# Patient Record
Sex: FEMALE | Race: BLACK OR AFRICAN AMERICAN | NOT HISPANIC OR LATINO | Employment: FULL TIME | URBAN - METROPOLITAN AREA
[De-identification: names, ages, dates, MRNs, and addresses within clinical notes are randomized per-mention and may not be internally consistent; named-entity substitution may affect disease eponyms.]

---

## 2017-01-06 ENCOUNTER — PATIENT MESSAGE (OUTPATIENT)
Dept: ALLERGY | Facility: CLINIC | Age: 47
End: 2017-01-06

## 2017-01-11 DIAGNOSIS — L50.8 CHRONIC URTICARIA: ICD-10-CM

## 2017-01-11 NOTE — TELEPHONE ENCOUNTER
----- Message from Neha Crain sent at 1/5/2017  9:45 AM CST -----  Contact: [facility]/[phone]  RX request - refill or new RX.  Is this a refill or new RX:  refill  RX name and strength: levocetirizine (XYZAL) 5 MG tablet  Directions: Take 1 tablet (5 mg total) by mouth 2 (two) times daily  Is this a 30 day or 90 day RX:    Pharmacy name and phone #: [facility], [address], [facility] [phone]  Comments:  Please call and advise      Thank you

## 2017-01-18 RX ORDER — LEVOCETIRIZINE DIHYDROCHLORIDE 5 MG/1
5 TABLET, FILM COATED ORAL 2 TIMES DAILY
Qty: 90 TABLET | Refills: 3 | Status: SHIPPED | OUTPATIENT
Start: 2017-01-18 | End: 2018-11-27

## 2017-01-31 ENCOUNTER — OFFICE VISIT (OUTPATIENT)
Dept: ALLERGY | Facility: CLINIC | Age: 47
End: 2017-01-31
Payer: COMMERCIAL

## 2017-01-31 VITALS
SYSTOLIC BLOOD PRESSURE: 100 MMHG | HEART RATE: 88 BPM | BODY MASS INDEX: 23.85 KG/M2 | WEIGHT: 126.31 LBS | DIASTOLIC BLOOD PRESSURE: 64 MMHG | HEIGHT: 61 IN

## 2017-01-31 DIAGNOSIS — J30.1 SEASONAL ALLERGIC RHINITIS DUE TO POLLEN: Primary | ICD-10-CM

## 2017-01-31 PROCEDURE — 99213 OFFICE O/P EST LOW 20 MIN: CPT | Mod: S$GLB,,, | Performed by: ALLERGY & IMMUNOLOGY

## 2017-01-31 PROCEDURE — 99999 PR PBB SHADOW E&M-EST. PATIENT-LVL III: CPT | Mod: PBBFAC,,, | Performed by: ALLERGY & IMMUNOLOGY

## 2017-01-31 NOTE — PROGRESS NOTES
Allergy and Immunology Follow Up      CC:  Follow up       HPI:  Shae Trotter  is a 46 y.o. female with hx of allergic rhinitis on immunotherapy since 1999 and hx of chronic urticaria here for her annual follow up. Patient reports return of symptoms off of immunotherapy x6 months.  She reports signficiant improvement in symptoms and occasional use of medications while on immunotherapy.  She wishes to proceed with immunotherapy today.  She denies any shortness of breath, wheezing.  Reports urticaria has resolved since last time.  Denies history of asthma.         REVIEW OF SYSTEMS    The balance of the ROS is negative unless otherwise specified in the HPI.    Past Medical History   Diagnosis Date    Allergy     Breast cancer     Genetic testing 11/2014     BRCA2 deleterious mutation    Other screening mammogram 6/24/2015    Seasonal allergies        Past Surgical History   Procedure Laterality Date    Right breast cysts removed      Left mastectomy and chemo Left 2006     TRAM flap reconstruction     D&c hysterscope.      Mastectomy Left 2006    Tram flap  Left 2006     breast reconstruction.     Pr removal of ovary/tube(s)      Hysterectomy  4/13/15     Robotic LH/BSO        Family History   Problem Relation Age of Onset    Diabetes Mother     Ovarian cancer Neg Hx     Uterine cancer Neg Hx     Breast cancer Neg Hx     Colon cancer Neg Hx        Social History     Social History    Marital status:      Spouse name: N/A    Number of children: N/A    Years of education: N/A     Social History Main Topics    Smoking status: Never Smoker    Smokeless tobacco: Never Used    Alcohol use No    Drug use: No    Sexual activity: Yes     Partners: Male     Birth control/ protection: Condom     Other Topics Concern    None     Social History Narrative       Current Outpatient Prescriptions   Medication Sig Dispense Refill    levocetirizine (XYZAL) 5 MG tablet Take 1 tablet (5 mg total)  by mouth 2 (two) times daily. 90 tablet 3    epinephrine (EPIPEN) 0.3 mg/0.3 mL (1:1,000) AtIn Inject 0.3 mLs (0.3 mg total) into the muscle once. Use in case of a life-threatening allergic reaction 2 Device 1     Current Facility-Administered Medications   Medication Dose Route Frequency Provider Last Rate Last Dose    Allergy Mix   Subcutaneous 1 time in Clinic/HOD Sunny Mujica MD           Review of patient's allergies indicates:   Allergen Reactions    Fish oil Anaphylaxis    Aspirin      Other reaction(s): Hives    Avocado Hives     Other reaction(s): Unknown    Fruit extracts Hives     Other reaction(s): Unknown  Allergic to most fruits - hives/swelling    Penicillins Hives         Vitals:    01/31/17 1437   BP: 100/64   Pulse: 88        PHYSICAL EXAM:  General: NAD, alert and oriented x 3, pleasant   Head:    normocephalic, atraumatic   Eyes:    PERRL, EOMI, no conjunctival injection   Ears:    Bilateral TMs normal, clear canal   Nose:    2-3+ pink/pale turbinates, no polyps, ulcers or other lesions noted, stringy mucus  Throat:  No post nasal drip, No cobblestoning, no oropharyngeal thrush   Neck:    No cervical adenopathy, no thyromegaly  CV:      Normal S1S2, no murmurs/rubs/gallops, regular rhythm   Chest:   Clear to auscultation bilaterally, no wheezes/rales/rhonchi   Abd:     +BS, soft, nontender, nondistended, no HSM   Ext:     No cyanosis, clubbing, or edema   Skin:    No rashes  Psych:   Normal affect and mood  Neuro:   No ataxia, Cranial nerves grossly intact      Lab Results   Component Value Date    WBC 7.07 10/03/2016    HGB 12.7 10/03/2016    HCT 40.2 10/03/2016    MCV 82 10/03/2016     10/03/2016     @UZKCFGORD49(GLU,NA,K,Cl,CO2,BUN,Creatinine,Calcium,MG)@  Lab Results   Component Value Date    INR 1.0 10/03/2016    INR 1.0 12/16/2015     Lab Results   Component Value Date    HGBA1C 5.3 07/28/2010     No results for input(s): POCTGLUCOSE in the last 72  hours.      =============================================================================  ALLERGEN TESTING   ----------------    SKIN PRICK TESTING: Hx of skin test positivity to multiple allergens excluding molds.     IMMUNOCAPS: n/A    =============================================================================  PULMONARY FUNCTION  ------------------    PEAK FLOWS (CLINIC): N/A    PULM FUNCTION TESTS: N/A     .  ASSESSMENT/ PLAN    Allergic rhinitis: on allergen immunotherapy: Symptoms refractory off of immunotherapy and would like to continue it.  - 0.05cc 1:10 yellow vial administered today.  Patient watched for 30 minutes with no change in clinical status and discharge in stable condition  - Patient to follow up with infusion center for continued injections  - Patient declined epipen as she has not needed to use it in the past.  She understands need for emergent care if she has concerning signs and symptoms of anaphylacis.   - Continue Xyzal 5 mg po daily.     Chronic urticaria: Resolved.      Follow up in 1 year or sooner should symptoms flare.  Shae was seen today for other.    Diagnoses and all orders for this visit:    Seasonal allergic rhinitis due to pollen          Kortney Laura DO   Allergy&Immunology Fellow   PGY-5  01/31/2017

## 2017-02-13 ENCOUNTER — PATIENT MESSAGE (OUTPATIENT)
Dept: ALLERGY | Facility: CLINIC | Age: 47
End: 2017-02-13

## 2017-03-01 ENCOUNTER — PATIENT MESSAGE (OUTPATIENT)
Dept: ALLERGY | Facility: CLINIC | Age: 47
End: 2017-03-01

## 2017-03-03 ENCOUNTER — TELEPHONE (OUTPATIENT)
Dept: ALLERGY | Facility: CLINIC | Age: 47
End: 2017-03-03

## 2017-03-06 NOTE — TELEPHONE ENCOUNTER
Urszula,    Just looking at encounters and thought i would send this as a reminder.  Are the injections ready.

## 2017-03-14 ENCOUNTER — CLINICAL SUPPORT (OUTPATIENT)
Dept: INFECTIOUS DISEASES | Facility: CLINIC | Age: 47
End: 2017-03-14
Payer: COMMERCIAL

## 2017-03-14 PROCEDURE — 95115 IMMUNOTHERAPY ONE INJECTION: CPT | Mod: S$GLB,,, | Performed by: INTERNAL MEDICINE

## 2017-04-04 ENCOUNTER — PATIENT MESSAGE (OUTPATIENT)
Dept: ALLERGY | Facility: CLINIC | Age: 47
End: 2017-04-04

## 2017-04-17 ENCOUNTER — CLINICAL SUPPORT (OUTPATIENT)
Dept: INTERNAL MEDICINE | Facility: CLINIC | Age: 47
End: 2017-04-17
Payer: COMMERCIAL

## 2017-04-17 DIAGNOSIS — J30.9 ALLERGIC RHINITIS, UNSPECIFIED ALLERGIC RHINITIS TRIGGER, UNSPECIFIED RHINITIS SEASONALITY: ICD-10-CM

## 2017-04-17 PROCEDURE — 99499 UNLISTED E&M SERVICE: CPT | Mod: S$GLB,,, | Performed by: FAMILY MEDICINE

## 2017-04-17 PROCEDURE — 95117 IMMUNOTHERAPY INJECTIONS: CPT | Mod: S$GLB,,, | Performed by: FAMILY MEDICINE

## 2017-04-17 NOTE — PROGRESS NOTES
Patient  In  for Allergy injections.  SEE XIS . Tolerated very well. No problems from last injection.

## 2017-04-21 ENCOUNTER — CLINICAL SUPPORT (OUTPATIENT)
Dept: INTERNAL MEDICINE | Facility: CLINIC | Age: 47
End: 2017-04-21
Payer: COMMERCIAL

## 2017-04-21 DIAGNOSIS — J30.9 ALLERGIC RHINITIS, UNSPECIFIED ALLERGIC RHINITIS TRIGGER, UNSPECIFIED RHINITIS SEASONALITY: ICD-10-CM

## 2017-04-21 PROCEDURE — 95117 IMMUNOTHERAPY INJECTIONS: CPT | Mod: S$GLB,,, | Performed by: FAMILY MEDICINE

## 2017-04-21 PROCEDURE — 99499 UNLISTED E&M SERVICE: CPT | Mod: S$GLB,,, | Performed by: FAMILY MEDICINE

## 2017-04-21 NOTE — PROGRESS NOTES
Patient in for allergy injection. Stated no new meds, no problems with last injection.    0.15  ml of 1:10  1 of 3   given SQ in  Left Upper arm. Tolerated well.      0.15 ml of 1:10  2 of 3   Given SQ in  Left Lower arm . Tolerated well.     0.15 ml of 1:10   3 of 3   Given SQ in Right lower arm . Tolerated well.     Patient assessed after 30 minutes . No reaction noted.     Patient voice no complaints

## 2017-05-08 ENCOUNTER — CLINICAL SUPPORT (OUTPATIENT)
Dept: INTERNAL MEDICINE | Facility: CLINIC | Age: 47
End: 2017-05-08
Payer: COMMERCIAL

## 2017-05-08 DIAGNOSIS — J30.9 ALLERGIC RHINITIS, UNSPECIFIED ALLERGIC RHINITIS TRIGGER, UNSPECIFIED RHINITIS SEASONALITY: ICD-10-CM

## 2017-05-08 PROCEDURE — 99499 UNLISTED E&M SERVICE: CPT | Mod: S$GLB,,, | Performed by: FAMILY MEDICINE

## 2017-05-08 PROCEDURE — 95117 IMMUNOTHERAPY INJECTIONS: CPT | Mod: S$GLB,,, | Performed by: FAMILY MEDICINE

## 2017-05-08 NOTE — PROGRESS NOTES
Patient in for allergy injection. Stated no new meds, no problems with last injection.    0.15  ml of 1:10 1 of 3   given SQ in Left Upper  arm. Tolerated well.    0.15 ml of 1:10  2 of 3  Given SQ in Left Lower arm. Tolerated well.  0.15 ml of 1:10  3 of 3 given SQ in Right Upper arm.                Patient assessed after 30 minutes . No reaction noted.     Patient voice no complaints

## 2017-05-22 ENCOUNTER — CLINICAL SUPPORT (OUTPATIENT)
Dept: INTERNAL MEDICINE | Facility: CLINIC | Age: 47
End: 2017-05-22
Payer: COMMERCIAL

## 2017-05-22 DIAGNOSIS — J30.9 ALLERGIC RHINITIS, UNSPECIFIED ALLERGIC RHINITIS TRIGGER, UNSPECIFIED RHINITIS SEASONALITY: ICD-10-CM

## 2017-05-22 PROCEDURE — 99499 UNLISTED E&M SERVICE: CPT | Mod: S$GLB,,, | Performed by: FAMILY MEDICINE

## 2017-05-22 PROCEDURE — 95117 IMMUNOTHERAPY INJECTIONS: CPT | Mod: S$GLB,,, | Performed by: FAMILY MEDICINE

## 2017-05-22 PROCEDURE — 99999 PR PBB SHADOW E&M-EST. PATIENT-LVL I: CPT | Mod: PBBFAC,,,

## 2017-05-25 ENCOUNTER — PATIENT OUTREACH (OUTPATIENT)
Dept: ADMINISTRATIVE | Facility: HOSPITAL | Age: 47
End: 2017-05-25

## 2017-05-26 ENCOUNTER — PATIENT MESSAGE (OUTPATIENT)
Dept: ADMINISTRATIVE | Facility: HOSPITAL | Age: 47
End: 2017-05-26

## 2017-05-30 ENCOUNTER — OFFICE VISIT (OUTPATIENT)
Dept: NEUROSURGERY | Facility: CLINIC | Age: 47
End: 2017-05-30
Payer: COMMERCIAL

## 2017-05-30 VITALS
BODY MASS INDEX: 23.22 KG/M2 | DIASTOLIC BLOOD PRESSURE: 79 MMHG | SYSTOLIC BLOOD PRESSURE: 115 MMHG | HEIGHT: 61 IN | WEIGHT: 123 LBS | HEART RATE: 95 BPM

## 2017-05-30 DIAGNOSIS — M54.9 MUSCULOSKELETAL BACK PAIN: Primary | ICD-10-CM

## 2017-05-30 PROCEDURE — 99999 PR PBB SHADOW E&M-EST. PATIENT-LVL III: CPT | Mod: PBBFAC,,, | Performed by: PHYSICIAN ASSISTANT

## 2017-05-30 PROCEDURE — 99203 OFFICE O/P NEW LOW 30 MIN: CPT | Mod: S$GLB,,, | Performed by: PHYSICIAN ASSISTANT

## 2017-05-30 RX ORDER — CYCLOBENZAPRINE HCL 10 MG
10 TABLET ORAL 3 TIMES DAILY PRN
Qty: 30 TABLET | Refills: 0 | Status: SHIPPED | OUTPATIENT
Start: 2017-05-30 | End: 2018-07-19

## 2017-05-30 NOTE — PROGRESS NOTES
[facility] - Neurosurgery  Clinic Consult     Consult Requested By: Self Referral     Reason for Consult: Back Pain       SUBJECTIVE:     Chief Complaint: left sided mid back pain     History of Present Illness:  Shae Trotter is a 46 y.o. female who presents with complaints of acute onset of left sided upper-mid back pain. She states the pain was present when she woke up this morning. It is located along the medial border of the left scapula. It is constant. She states it feels like a muscle spasm. It is worse with movement of her trunk, especially twisting and bending. She also notes it is occasionally worse with breathing. The pain does not radiate. She denies pain, numbness, or tingling in lower extremities. She denies trauma/injury to the area. he has tried a Salonpas patch without relief.       Low Back Pain Scale  R Low Back-Pain Score: 6  R Low Back-Pain Intensity: The pain is bad, but I manage without taking pain killers  R Low Back-Pain Score: I can look after myself normally but it causes extra pain  Low Back-Lifting: I can lift heavy weights but it gives extra pain   Low Back-Walking: Pain does not prevent me from walking any distance   Low Back-Sitting: I can sit in any chair as long as I like   Low Back-Standing: I can stand as long as I want without pain   Low Back-Sleeping: I have pain in bed but it does not prevent me from sleeping well   Low Back-Social Life: My social life is normal and give me no pain   Low Back-Traveling: I have no pain when traveling   Low Back-Changing Degree of Pain:  (pain is neither getting better or worse)         Review of patient's allergies indicates:   Allergen Reactions    Fish oil Anaphylaxis    Aspirin      Other reaction(s): Hives    Avocado Hives     Other reaction(s): Unknown    Fruit extracts Hives     Other reaction(s): Unknown  Allergic to most fruits - hives/swelling    Penicillins Hives       Past Medical History:   Diagnosis Date    Allergy      Breast cancer     Genetic testing 11/2014    BRCA2 deleterious mutation    Other screening mammogram 6/24/2015    Seasonal allergies      Past Surgical History:   Procedure Laterality Date    D&C hysterscope.      HYSTERECTOMY  4/13/15    Robotic LH/BSO     Left mastectomy and chemo Left 2006    TRAM flap reconstruction     MASTECTOMY Left 2006    [place] REMOVAL OF OVARY/TUBE(S)      Right breast cysts removed      tram flap  Left 2006    breast reconstruction.      Family History   Problem Relation Age of Onset    Diabetes Mother     Ovarian cancer Neg Hx     Uterine cancer Neg Hx     Breast cancer Neg Hx     Colon cancer Neg Hx      Social History   Substance Use Topics    Smoking status: Never Smoker    Smokeless tobacco: Never Used    Alcohol use No        Review of Systems:  Constitutional: no fever, chills or night sweats. No changes in weight   Eyes: no visual changes   ENT: no nasal congestion or sore throat   Respiratory: no cough or shortness of breath   Cardiovascular: no chest pain or palpitations   Gastrointestinal: no nausea or vomiting   Genitourinary: no hematuria or dysuria   Integument/Breast: no rash or pruritis   Musculoskeletal: positive for left sided upper-mid back pain   Neurological: no seizures or tremors. No neuropathy.       OBJECTIVE:     Vital Signs (Most Recent):  Vitals:    05/30/17 1405   BP: 115/79   Pulse: 95       Physical Exam:  General: well developed, well nourished, no distress.   Neurologic: Alert and oriented. Thought content appropriate.  GCS: Motor: 6/Verbal: 5/Eyes: 4 GCS Total: 15  Mental Status: Awake, Alert, Oriented x 4  Language: No aphasia  Speech: No dysarthria  Head: normocephalic, atraumatic  Eyes: EOMI.  Neck: trachea midline, no JVD   Cardiovascular: no LE edema  Pulmonary: normal respirations, no signs of respiratory distress  Abdomen: non-distended  Sensory: intact to light touch throughout  Skin: Skin is warm, dry and intact.    Motor Strength:  Moves all extremities spontaneously with good tone.  No abnormal movements seen.   Strength  Deltoids Triceps Biceps Wrist Extension Wrist Flexion Hand  Interossei   Upper: R 5/5 5/5 5/5 5/5 5/5 5/5 5/5    L 5/5 5/5 5/5 5/5 5/5 5/5 5/5     Iliopsoas Quadriceps Knee  Flexion Tibialis  anterior Gastro- cnemius EHL    Lower: R 5/5 5/5 5/5 5/5 5/5 5/5     L 5/5 5/5 5/5 5/5 5/5 5/5      DTR's: 2 + and symmetric in UE and LE  Cobb: absent  Clonus: absent    Gait: normal         Able to walk on heels & toes    Back: full ROM, pain at the medial border of the left scapula elicited with lateral flexion, L>R. No midline tenderness to palpation. No paraspinal tenderness to palpation.       Diagnostic Results:  N/A    ASSESSMENT/PLAN:     47 yo female with acute onset of left sided upper-mid back pain, likely musculoskeletal.     Discussed with Dr. Bianchi. Due to the long list of differential diagnoses, we recommend CXR and being seen in the ED for evaluation. Discussed these recommendations with the patient. She does not wish to proceed with additional evaluations today. She states she has an appointment to establish care with primary care next week. She understands the need to be evaluated by the ED should her symptoms persist or worsen.     -Ibuprofen OTC as needed  -Flexeril 10 mg TID prn sent to pharmacy    Bridgett Baltazar PA-C  Neurosurgery

## 2017-06-07 ENCOUNTER — PATIENT MESSAGE (OUTPATIENT)
Dept: INTERNAL MEDICINE | Facility: CLINIC | Age: 47
End: 2017-06-07

## 2017-06-07 ENCOUNTER — LAB VISIT (OUTPATIENT)
Dept: LAB | Facility: OTHER | Age: 47
End: 2017-06-07
Attending: INTERNAL MEDICINE
Payer: COMMERCIAL

## 2017-06-07 ENCOUNTER — OFFICE VISIT (OUTPATIENT)
Dept: INTERNAL MEDICINE | Facility: CLINIC | Age: 47
End: 2017-06-07
Attending: INTERNAL MEDICINE
Payer: COMMERCIAL

## 2017-06-07 VITALS
HEART RATE: 94 BPM | HEIGHT: 61 IN | DIASTOLIC BLOOD PRESSURE: 76 MMHG | OXYGEN SATURATION: 96 % | SYSTOLIC BLOOD PRESSURE: 100 MMHG | WEIGHT: 126.31 LBS | BODY MASS INDEX: 23.85 KG/M2

## 2017-06-07 DIAGNOSIS — Z13.220 ENCOUNTER FOR LIPID SCREENING FOR CARDIOVASCULAR DISEASE: ICD-10-CM

## 2017-06-07 DIAGNOSIS — Z13.6 ENCOUNTER FOR LIPID SCREENING FOR CARDIOVASCULAR DISEASE: ICD-10-CM

## 2017-06-07 DIAGNOSIS — Z00.00 ANNUAL PHYSICAL EXAM: Primary | ICD-10-CM

## 2017-06-07 DIAGNOSIS — Z85.3 HX OF BREAST CANCER: ICD-10-CM

## 2017-06-07 DIAGNOSIS — Z00.00 ANNUAL PHYSICAL EXAM: ICD-10-CM

## 2017-06-07 DIAGNOSIS — D22.9 NEVUS: ICD-10-CM

## 2017-06-07 LAB
ALBUMIN SERPL BCP-MCNC: 3.8 G/DL
ALP SERPL-CCNC: 85 U/L
ALT SERPL W/O P-5'-P-CCNC: 15 U/L
ANION GAP SERPL CALC-SCNC: 8 MMOL/L
AST SERPL-CCNC: 16 U/L
BASOPHILS # BLD AUTO: 0 K/UL
BASOPHILS NFR BLD: 0 %
BILIRUB SERPL-MCNC: 0.2 MG/DL
BUN SERPL-MCNC: 15 MG/DL
CALCIUM SERPL-MCNC: 9.4 MG/DL
CHLORIDE SERPL-SCNC: 105 MMOL/L
CHOLEST/HDLC SERPL: 4.1 {RATIO}
CO2 SERPL-SCNC: 28 MMOL/L
CREAT SERPL-MCNC: 0.8 MG/DL
DIFFERENTIAL METHOD: ABNORMAL
EOSINOPHIL # BLD AUTO: 0 K/UL
EOSINOPHIL NFR BLD: 0 %
ERYTHROCYTE [DISTWIDTH] IN BLOOD BY AUTOMATED COUNT: 14.6 %
EST. GFR  (AFRICAN AMERICAN): >60 ML/MIN/1.73 M^2
EST. GFR  (NON AFRICAN AMERICAN): >60 ML/MIN/1.73 M^2
GLUCOSE SERPL-MCNC: 86 MG/DL
HCT VFR BLD AUTO: 40 %
HDL/CHOLESTEROL RATIO: 24.6 %
HDLC SERPL-MCNC: 236 MG/DL
HDLC SERPL-MCNC: 58 MG/DL
HGB BLD-MCNC: 12.5 G/DL
LDLC SERPL CALC-MCNC: 163.6 MG/DL
LYMPHOCYTES # BLD AUTO: 2.1 K/UL
LYMPHOCYTES NFR BLD: 36.3 %
MCH RBC QN AUTO: 26.1 PG
MCHC RBC AUTO-ENTMCNC: 31.3 %
MCV RBC AUTO: 84 FL
MONOCYTES # BLD AUTO: 0.4 K/UL
MONOCYTES NFR BLD: 6.1 %
NEUTROPHILS # BLD AUTO: 3.3 K/UL
NEUTROPHILS NFR BLD: 57.4 %
NONHDLC SERPL-MCNC: 178 MG/DL
PLATELET # BLD AUTO: 369 K/UL
PMV BLD AUTO: 10.7 FL
POTASSIUM SERPL-SCNC: 4.2 MMOL/L
PROT SERPL-MCNC: 7.5 G/DL
RBC # BLD AUTO: 4.79 M/UL
SODIUM SERPL-SCNC: 141 MMOL/L
TRIGL SERPL-MCNC: 72 MG/DL
TSH SERPL DL<=0.005 MIU/L-ACNC: 1.01 UIU/ML
WBC # BLD AUTO: 5.73 K/UL

## 2017-06-07 PROCEDURE — 99999 PR PBB SHADOW E&M-EST. PATIENT-LVL III: CPT | Mod: PBBFAC,,, | Performed by: INTERNAL MEDICINE

## 2017-06-07 PROCEDURE — 84443 ASSAY THYROID STIM HORMONE: CPT

## 2017-06-07 PROCEDURE — 80061 LIPID PANEL: CPT

## 2017-06-07 PROCEDURE — 80053 COMPREHEN METABOLIC PANEL: CPT

## 2017-06-07 PROCEDURE — 99396 PREV VISIT EST AGE 40-64: CPT | Mod: S$GLB,,, | Performed by: INTERNAL MEDICINE

## 2017-06-07 PROCEDURE — 85025 COMPLETE CBC W/AUTO DIFF WBC: CPT

## 2017-06-07 PROCEDURE — 36415 COLL VENOUS BLD VENIPUNCTURE: CPT

## 2017-06-07 NOTE — TELEPHONE ENCOUNTER
Entered mult options for other mm relaxers and receiving message about pot cross reactivity to fish oil. Called and discussed this with pt - will cut current dose of flexeril in 1/2 to see if helps with mm relaxation without making her drowsy. All questions were answered and pt verbalized understanding of plan.

## 2017-06-28 ENCOUNTER — CLINICAL SUPPORT (OUTPATIENT)
Dept: INTERNAL MEDICINE | Facility: CLINIC | Age: 47
End: 2017-06-28
Payer: COMMERCIAL

## 2017-06-28 DIAGNOSIS — J30.9 ALLERGIC RHINITIS, UNSPECIFIED ALLERGIC RHINITIS TRIGGER, UNSPECIFIED RHINITIS SEASONALITY: ICD-10-CM

## 2017-06-28 PROCEDURE — 95117 IMMUNOTHERAPY INJECTIONS: CPT | Mod: S$GLB,,, | Performed by: FAMILY MEDICINE

## 2017-06-28 PROCEDURE — 99499 UNLISTED E&M SERVICE: CPT | Mod: S$GLB,,, | Performed by: FAMILY MEDICINE

## 2017-06-28 NOTE — PROGRESS NOTES
Patient in for allergy injection. Stated no new meds, no problems with last injection.    0.50 ml of 1:100   given SQ in  Left Upper arm. Tolerated well.   0.50 ml of 1:100  Given SQ in Right Upper arm. Tolerated well.  Patient assessed after 30 minutes . No reaction noted.     Patient voice no complaints

## 2017-06-29 NOTE — PROGRESS NOTES
Patient in for allergy injection. Stated no new meds, no problems with last injection.    0.20  ml of 1:10 1 of 3   given SQ in Left Upper  arm. Tolerated well.   0.20 ml of 1:10  2 of 3   Given SQ in Left Lower arm. Tolerated well.  0.20 ml of 1:10  3 of 3   Given SQ in Right Lower arm. Tolerated well.    Patient assessed after 30 minutes . No reaction noted.     Patient voice no complaints

## 2017-07-20 ENCOUNTER — PATIENT MESSAGE (OUTPATIENT)
Dept: ALLERGY | Facility: CLINIC | Age: 47
End: 2017-07-20

## 2017-07-24 ENCOUNTER — CLINICAL SUPPORT (OUTPATIENT)
Dept: INTERNAL MEDICINE | Facility: CLINIC | Age: 47
End: 2017-07-24
Payer: COMMERCIAL

## 2017-07-24 DIAGNOSIS — J30.9 ACUTE ALLERGIC RHINITIS, UNSPECIFIED SEASONALITY, UNSPECIFIED TRIGGER: ICD-10-CM

## 2017-07-24 PROCEDURE — 99499 UNLISTED E&M SERVICE: CPT | Mod: S$GLB,,, | Performed by: ALLERGY & IMMUNOLOGY

## 2017-07-24 PROCEDURE — 95117 IMMUNOTHERAPY INJECTIONS: CPT | Mod: S$GLB,,, | Performed by: FAMILY MEDICINE

## 2017-07-24 NOTE — PROGRESS NOTES
Patient here for allergy injections.  No new meds, no problems with last injection.    0.4mL of 1:100 Blue given SQ in Lower Left Arm.  Tolerated well.  2+ reaction, with erythema and wheal.  0.4mL of 1:100 blue given Sq in Upper Left Arm.  Tolerated well, 0 reaction noted.  0.4mL of 1:100 blue given SQ in Upper Right Arm.  Tolerated well, 0 reaction noted.    No complaints voiced.

## 2017-07-31 ENCOUNTER — CLINICAL SUPPORT (OUTPATIENT)
Dept: INTERNAL MEDICINE | Facility: CLINIC | Age: 47
End: 2017-07-31
Payer: COMMERCIAL

## 2017-07-31 DIAGNOSIS — J30.9 ACUTE ALLERGIC RHINITIS, UNSPECIFIED SEASONALITY, UNSPECIFIED TRIGGER: ICD-10-CM

## 2017-07-31 PROCEDURE — 99499 UNLISTED E&M SERVICE: CPT | Mod: S$GLB,,, | Performed by: ALLERGY & IMMUNOLOGY

## 2017-07-31 PROCEDURE — 95117 IMMUNOTHERAPY INJECTIONS: CPT | Mod: S$GLB,,, | Performed by: FAMILY MEDICINE

## 2017-07-31 NOTE — PROGRESS NOTES
Patient here for allergy injections.  No new meds, no problems with last injection.    0.5mL of 1:100 Blue given SQ in mid Left Arm.  Tolerated well.  2+ reaction, with erythema, no wheal noted.  0.5mL of 1:100 blue given Sq in Upper Left Arm.  Tolerated well, 0 reaction noted.  0.5mL of 1:100 blue given SQ in Upper Right Arm.  Tolerated well, 0 reaction noted.    No complaints voiced.

## 2017-08-27 ENCOUNTER — PATIENT MESSAGE (OUTPATIENT)
Dept: ALLERGY | Facility: CLINIC | Age: 47
End: 2017-08-27

## 2017-09-06 ENCOUNTER — OFFICE VISIT (OUTPATIENT)
Dept: ALLERGY | Facility: CLINIC | Age: 47
End: 2017-09-06
Payer: COMMERCIAL

## 2017-09-06 VITALS
DIASTOLIC BLOOD PRESSURE: 74 MMHG | BODY MASS INDEX: 23.52 KG/M2 | SYSTOLIC BLOOD PRESSURE: 110 MMHG | WEIGHT: 124.56 LBS | HEIGHT: 61 IN

## 2017-09-06 DIAGNOSIS — J30.1 CHRONIC SEASONAL ALLERGIC RHINITIS DUE TO POLLEN: ICD-10-CM

## 2017-09-06 DIAGNOSIS — L50.1 CHRONIC IDIOPATHIC URTICARIA: Primary | ICD-10-CM

## 2017-09-06 PROCEDURE — 3008F BODY MASS INDEX DOCD: CPT | Mod: S$GLB,,, | Performed by: STUDENT IN AN ORGANIZED HEALTH CARE EDUCATION/TRAINING PROGRAM

## 2017-09-06 PROCEDURE — 99214 OFFICE O/P EST MOD 30 MIN: CPT | Mod: S$GLB,,, | Performed by: STUDENT IN AN ORGANIZED HEALTH CARE EDUCATION/TRAINING PROGRAM

## 2017-09-06 PROCEDURE — 99999 PR PBB SHADOW E&M-EST. PATIENT-LVL III: CPT | Mod: PBBFAC,,, | Performed by: STUDENT IN AN ORGANIZED HEALTH CARE EDUCATION/TRAINING PROGRAM

## 2017-09-06 NOTE — PROGRESS NOTES
Progress Notes:    CC: follow up chronic idiopathic urticaria and chronic allergic rhinitis    HPI: Oralia Trotter  is a 46 y.o. female with hx of allergic rhinitis on immunotherapy and hx of chronic idiopathic urticaria here for her follow up and her current urticaria lesions. Patient stated that she started having urticaria lesions 2 weeks ago. She was taking cetirizine 10 mg 2 tablets twice a day for one week, but she did not have any improvement in her symptoms of urticaria. She has urticarial like lesions on her lower legs, body and upper extremities which was itchy, tevin on night even though she takes cetirizine in the evening. Denies any respiratory symptoms, GI symptoms etc. She is on immunotherapy for chronic allergic rhinitis for more than 10 years. Patient stated that when she was off for immunotherapy, she c/o rhinitis symptoms therefore, she is now having immunotherapy injection every 2 weeks.  She has signficiant improvement in rhinitis symptoms on immunotherapy.    REVIEW OF SYSTEMS     CONST: no F/C/NS, no unintentional weight changes  NEURO: no H/A, no weakness, no paresthesias  EYES: no discharge, no pruritus, no erythema  EARS: no hearing loss, no sensation of fullness  NOSE: no congestion, no rhinorrhea, no discharge, no itching, no sneezing  PULM: no SOB, no wheezing, no cough, no snoring  CV: no CP, no palpitations, no leg swelling  GI: no dysphagia, no heartburn, no pain, no N/V/D, no BRBPR/melena  URO: no dysuria, no hematuria, no nocturia  MSK: no joint pain, no muscle pain  DERM: raised erythematous urticarial lesions on her lower abdomen           Past Medical History   Diagnosis Date    Allergy      Breast cancer      Genetic testing 11/2014       BRCA2 deleterious mutation    Other screening mammogram 6/24/2015    Seasonal allergies                  Past Surgical History   Procedure Laterality Date    Right breast cysts removed        Left mastectomy and chemo Left 2006       TRAM  flap reconstruction     D&c hysterscope.        Mastectomy Left 2006    Tram flap  Left 2006       breast reconstruction.     Pr removal of ovary/tube(s)        Hysterectomy   4/13/15       Robotic LH/BSO                Family History   Problem Relation Age of Onset    Diabetes Mother      Ovarian cancer Neg Hx      Uterine cancer Neg Hx      Breast cancer Neg Hx      Colon cancer Neg Hx           Social History            Social History    Marital status:        Spouse name: N/A    Number of children: N/A    Years of education: N/A            Social History Main Topics    Smoking status: Never Smoker    Smokeless tobacco: Never Used    Alcohol use No    Drug use: No    Sexual activity: Yes       Partners: Male       Birth control/ protection: Condom           Other Topics Concern    None      Social History Narrative         Current Medications          Current Outpatient Prescriptions   Medication Sig Dispense Refill    levocetirizine (XYZAL) 5 MG tablet Take 1 tablet (5 mg total) by mouth 2 (two) times daily. 90 tablet 3    epinephrine (EPIPEN) 0.3 mg/0.3 mL (1:1,000) AtIn Inject 0.3 mLs (0.3 mg total) into the muscle once. Use in case of a life-threatening allergic reaction 2 Device 1                Current Facility-Administered Medications   Medication Dose Route Frequency Provider Last Rate Last Dose    Allergy Mix   Subcutaneous 1 time in Clinic/HOD Sunny Mujica MD                      Review of patient's allergies indicates:   Allergen Reactions    Fish oil Anaphylaxis    Aspirin         Other reaction(s): Hives    Avocado Hives       Other reaction(s): Unknown    Fruit extracts Hives       Other reaction(s): Unknown  Allergic to most fruits - hives/swelling    Penicillins Hives         PHYSICAL EXAM:  /74mmHg.  General: NAD, alert and oriented x 3, pleasant   Head:    normocephalic, atraumatic   Eyes:    PERRL, EOMI, no conjunctival injection   Ears:    Bilateral  TMs normal, clear canal   Nose:    3+ pink turbinates, no polyps, ulcers or other lesions noted, stringy mucus  Throat:  No post nasal drip, No cobblestoning, no oropharyngeal thrush   Neck:    No cervical adenopathy, no thyromegaly  CV:      Normal S1S2, no murmurs/rubs/gallops, regular rhythm   Chest:   Clear to auscultation bilaterally, no wheezes/rales/rhonchi   Abd:     +BS, soft, nontender, nondistended, no HSM   Ext:     No cyanosis, clubbing, or edema   Skin:    No rashes  Psych:   Normal affect and mood  Neuro:   No ataxia, Cranial nerves grossly intact              Lab Results   Component Value Date     WBC 7.07 10/03/2016     HGB 12.7 10/03/2016     HCT 40.2 10/03/2016     MCV 82 10/03/2016      10/03/2016      @[id number](GLU,NA,K,Cl,CO2,BUN,Creatinine,Calcium,MG)@        Lab Results   Component Value Date     INR 1.0 10/03/2016     INR 1.0 12/16/2015            Lab Results   Component Value Date     HGBA1C 5.3 07/28/2010      No results for input(s): POCTGLUCOSE in the last 72 hours.        =============================================================================  ALLERGEN TESTING   ----------------     SKIN PRICK TESTING: Hx of skin test positivity to multiple allergens excluding molds.      IMMUNOCAPS: N/A    Assessment     Chronic idiopathic urticaria: minimally controlled with high dose cetirizine 10mg 2 tablets twice daily.   -to start Omalizumab 300mg Subcutaneously every 4 weeks.   -Explained to the patient dose range, route of injections and side effect of medication to the patient.   -In the mean time, to continue take cetirizine 10mg 2 tablets in the morning and at night with zantac 150mg twice daily.    Chronic allergic rhinitis: controlled with immunotherapy injections every 2 weeks.    RTC 6 weeks    Thu Yamini Membreno M.D  Allergy/Immunology Fellow.

## 2017-09-06 NOTE — PATIENT INSTRUCTIONS
-Prescribed Omalizumab 300mg subcutaneously for chronic urticaria.  -In the mean time to take cetirizine 10mg 2 tablets twice daily with zantac 150mg twice daily.

## 2017-09-07 ENCOUNTER — TELEPHONE (OUTPATIENT)
Dept: PHARMACY | Facility: CLINIC | Age: 47
End: 2017-09-07

## 2017-09-07 NOTE — TELEPHONE ENCOUNTER
Informed patient we have received an order for Xolair  from your provider and will contact you once a benefits investigation is complete with copay information to set up pickup or shipment and [facility] consultation.  feel free to give us a call with  any questions prior to hearing back from us at [phone]. thank you!_KAY 9/7/17

## 2017-09-07 NOTE — TELEPHONE ENCOUNTER
[facility] received prescription for Xolair 300mg SQ every 28 days.  Prior authorization is required.      Patient's Estimated Creatinine Clearance:  78mL/min (based on Cr of 0.8 from 6/7/2017). Dosage adjustment is not required.     Dosage appropriate based on therapy for Chronic idiopathic urticaria: SubQ: 150 or 300 mg every 4 weeks. Dosing is not dependent on serum IgE (free or total) level or body weight.    DDIs: medication list reviewed, none expected with Xolair

## 2017-09-11 ENCOUNTER — CLINICAL SUPPORT (OUTPATIENT)
Dept: INTERNAL MEDICINE | Facility: CLINIC | Age: 47
End: 2017-09-11
Payer: COMMERCIAL

## 2017-09-11 DIAGNOSIS — J30.9 ACUTE ALLERGIC RHINITIS, UNSPECIFIED SEASONALITY, UNSPECIFIED TRIGGER: ICD-10-CM

## 2017-09-11 PROCEDURE — 95117 IMMUNOTHERAPY INJECTIONS: CPT | Mod: S$GLB,,, | Performed by: FAMILY MEDICINE

## 2017-09-11 PROCEDURE — 99499 UNLISTED E&M SERVICE: CPT | Mod: S$GLB,,, | Performed by: FAMILY MEDICINE

## 2017-09-11 NOTE — PROGRESS NOTES
Patient here for allergy injections.  No new meds, no problems with last injection.  Patient did state that she had been developing hives since her last injection and was seen by Dr. Mujica.  He advised her to take 40mg of Zyrtec daily.  She stated hives have resolved.  Because patient has had an injection since 7/31/17 (42 days) patient was backed up to 0.2mL of 1:100.      0.2mL of 1:100 Blue given SQ in mid Left Arm.  Tolerated well.  0 reaction noted.  0.2mL of 1:100 blue given Sq in Upper Left Arm.  Tolerated well, 0 reaction noted.  0.2mL of 1:100 blue given SQ in Upper Right Arm.  Tolerated well, 0 reaction noted.    No complaints voiced.

## 2017-09-15 ENCOUNTER — CLINICAL SUPPORT (OUTPATIENT)
Dept: INTERNAL MEDICINE | Facility: CLINIC | Age: 47
End: 2017-09-15
Payer: COMMERCIAL

## 2017-09-15 DIAGNOSIS — J30.1 ALLERGIC RHINITIS DUE TO POLLEN, UNSPECIFIED CHRONICITY, UNSPECIFIED SEASONALITY: ICD-10-CM

## 2017-09-15 PROCEDURE — 95117 IMMUNOTHERAPY INJECTIONS: CPT | Mod: S$GLB,,, | Performed by: INTERNAL MEDICINE

## 2017-09-15 PROCEDURE — 99499 UNLISTED E&M SERVICE: CPT | Mod: S$GLB,,, | Performed by: ALLERGY & IMMUNOLOGY

## 2017-09-15 NOTE — PROGRESS NOTES
Patient here for allergy injections.  No new meds, no problems with last injection.     0.3mL of 1:100 Blue given SQ in mid Left Arm.  Tolerated well.  0 reaction noted.  0.3mL of 1:100 blue given Sq in Upper Left Arm.  Tolerated well, 0 reaction noted.  0.3mL of 1:100 blue given SQ in Upper Right Arm.  Tolerated well, 0 reaction noted.    No complaints voiced.

## 2017-10-06 ENCOUNTER — CLINICAL SUPPORT (OUTPATIENT)
Dept: INTERNAL MEDICINE | Facility: CLINIC | Age: 47
End: 2017-10-06
Payer: COMMERCIAL

## 2017-10-06 DIAGNOSIS — J30.1 ALLERGIC RHINITIS DUE TO POLLEN, UNSPECIFIED CHRONICITY, UNSPECIFIED SEASONALITY: Primary | ICD-10-CM

## 2017-10-06 NOTE — PROGRESS NOTES
Pt states, no changes in meds, no problems with last injection, no peakflow required.     Injections given:  One of three (dm/c/d) vial 1:100 (blue) 0.3ml sub q Upper L arm  No notable reaction after 30minutes, pt tolerated well.    Two of three ( gr/tr) vial 1:100 (blue) 0.3ml sub q Mid Left arm  No notable reaction after 30 min, pt tolerated well.     Three of three ( w/tr) vial 1:100 (blue) 0.3ml sub q upper right arm  No notable reaction after 30 min, pt tolerated well.

## 2017-10-11 ENCOUNTER — CLINICAL SUPPORT (OUTPATIENT)
Dept: INTERNAL MEDICINE | Facility: CLINIC | Age: 47
End: 2017-10-11
Payer: COMMERCIAL

## 2017-10-11 DIAGNOSIS — J30.9 ACUTE ALLERGIC RHINITIS, UNSPECIFIED SEASONALITY, UNSPECIFIED TRIGGER: ICD-10-CM

## 2017-10-11 PROCEDURE — 95117 IMMUNOTHERAPY INJECTIONS: CPT | Mod: S$GLB,,, | Performed by: FAMILY MEDICINE

## 2017-10-11 PROCEDURE — 99499 UNLISTED E&M SERVICE: CPT | Mod: S$GLB,,, | Performed by: ALLERGY & IMMUNOLOGY

## 2017-10-11 NOTE — PROGRESS NOTES
Pt states, no changes in meds, no problems with last injection, no peakflow required.     Injections given:  One of three (dm/c/d) vial 1:100 (blue) 0.4ml sub q Mid Left arm  2+ reaction noted after  30 minutes, pt tolerated well.    Two of three ( gr/tr) vial 1:100 (blue) 0.4ml sub q  Upper Left arm  No notable reaction after 30 min, pt tolerated well.     Three of three ( w/tr) vial 1:100 (blue) 0.4ml sub q upper right arm  No notable reaction after 30 min, pt tolerated well.     Patient voiced no complaints.

## 2017-11-06 ENCOUNTER — TELEPHONE (OUTPATIENT)
Dept: ALLERGY | Facility: CLINIC | Age: 47
End: 2017-11-06

## 2017-11-06 DIAGNOSIS — L50.1 CHRONIC IDIOPATHIC URTICARIA: Primary | ICD-10-CM

## 2017-11-06 NOTE — TELEPHONE ENCOUNTER
----- Message from Fior Buck PharmD sent at 11/6/2017  2:26 PM CST -----  Regarding: Xolair Denial  Hi Dr Mujica and staff,    Wanted to followup regarding a Xolair rx that was sent to us from Dr Yamini Membreno MD in Sept.     For chronic idiopathic urticaria- Xolair approval requires:     1. Presence of hives on most days of the week for at least 6 weeks despite trial of high dose H1 antihistamine (such as four-fold dosing of Clarinex or Xyzal) AND a leukotriene antagonist for at least 2 weeks.     We see in the notes she is on high doses of antihistamines but don't see any record of Singulair trial (at least 2 weeks). Please advise how you would like to proceed. Thank you!    Fior Buck, Pharm.D  Specialty Pharmacy Clinical Pharmacist  [facility]  Phone: [phone]

## 2017-11-08 ENCOUNTER — PATIENT MESSAGE (OUTPATIENT)
Dept: ALLERGY | Facility: CLINIC | Age: 47
End: 2017-11-08

## 2017-11-08 RX ORDER — MONTELUKAST SODIUM 10 MG/1
10 TABLET ORAL NIGHTLY
Qty: 30 TABLET | Refills: 0 | Status: SHIPPED | OUTPATIENT
Start: 2017-11-08 | End: 2017-12-08

## 2017-11-10 ENCOUNTER — TELEPHONE (OUTPATIENT)
Dept: ALLERGY | Facility: CLINIC | Age: 47
End: 2017-11-10

## 2017-11-10 NOTE — TELEPHONE ENCOUNTER
----- Message from Patsy Brooks LPN sent at 11/8/2017  3:40 PM CST -----  Patient needs a follow up call

## 2017-11-20 ENCOUNTER — TELEPHONE (OUTPATIENT)
Dept: ALLERGY | Facility: CLINIC | Age: 47
End: 2017-11-20

## 2017-11-20 ENCOUNTER — PATIENT MESSAGE (OUTPATIENT)
Dept: INTERNAL MEDICINE | Facility: CLINIC | Age: 47
End: 2017-11-20

## 2017-11-20 ENCOUNTER — CLINICAL SUPPORT (OUTPATIENT)
Dept: INTERNAL MEDICINE | Facility: CLINIC | Age: 47
End: 2017-11-20
Payer: COMMERCIAL

## 2017-11-20 DIAGNOSIS — J30.9 CHRONIC ALLERGIC RHINITIS, UNSPECIFIED SEASONALITY, UNSPECIFIED TRIGGER: ICD-10-CM

## 2017-11-20 PROCEDURE — 99499 UNLISTED E&M SERVICE: CPT | Mod: S$GLB,,, | Performed by: ALLERGY & IMMUNOLOGY

## 2017-11-20 PROCEDURE — 95117 IMMUNOTHERAPY INJECTIONS: CPT | Mod: S$GLB,,, | Performed by: FAMILY MEDICINE

## 2017-11-20 NOTE — TELEPHONE ENCOUNTER
Spoke with Dr. Mujica Wednesday, 11/15/17 re:  Patient's dose for IT.  1:100 Blue was not made, even though ordered.  Dr. Mujica gave verbal order that since patient has tolerated 0.1mL of Yellow 1:10, she can get that dosage for next injection.  Last dose was 1:100, 0.4mL.  Patient has been on maintenance dose and vial in the past and has tolerated 0.1mL of 1:10 yellow.

## 2017-11-20 NOTE — PROGRESS NOTES
Pt states, no changes in meds, no problems with last injection.  Verbal order from Dr. Mujica 11/15/17 to give patient 0.1mL of 1:10 of all three injections.  Injections given:  0.1mL of Yellow 1:10 given SQ in Upper Left arm 1 of 3.  Tolerated well.    0.1mL of yellow 1:10 given SQ in Upper Right arm 2 of 3..  Tolerated well    0.1mL of yellow 1:10 given SQ in Lower Left arm 3 of 3.  Tolerated well.    Patient assessed after 30 minutes.    1/3 left arm, 1+ reaction.  2/3 right arm, 0 reaction.  3/3 lower left arm, 1+ reaction.    Patient voiced no complaints.

## 2017-11-21 NOTE — TELEPHONE ENCOUNTER
Likely viral - supportive care with inc rest and fluids is recommended. Can take mucinex to thin mucous and dextromethorphan to help with cough  Can take otc tylenol and alternate with ibuprofen to help with body aches or fever. If not improving then rec she make UC appt

## 2017-11-21 NOTE — TELEPHONE ENCOUNTER
Called pt and discussed  rec and advice.   Pt verbally understands and states that she will try the RX that was rec by  .  Pt verbally understands and has no further questions or concerns.

## 2017-11-22 ENCOUNTER — CLINICAL SUPPORT (OUTPATIENT)
Dept: INTERNAL MEDICINE | Facility: CLINIC | Age: 47
End: 2017-11-22
Payer: COMMERCIAL

## 2017-11-22 DIAGNOSIS — J30.9 CHRONIC ALLERGIC RHINITIS, UNSPECIFIED SEASONALITY, UNSPECIFIED TRIGGER: ICD-10-CM

## 2017-11-22 DIAGNOSIS — N85.9 OTHER SPECIFIED DISORDERS OF UTERUS, NOT ELSEWHERE CLASSIFIED: ICD-10-CM

## 2017-11-22 PROCEDURE — 95117 IMMUNOTHERAPY INJECTIONS: CPT | Mod: S$GLB,,, | Performed by: FAMILY MEDICINE

## 2017-11-22 PROCEDURE — 99499 UNLISTED E&M SERVICE: CPT | Mod: S$GLB,,, | Performed by: ALLERGY & IMMUNOLOGY

## 2017-11-22 NOTE — PROGRESS NOTES
Pt states, no changes in meds, no problems with last injection.  Verbal order from Dr. Mujica 11/15/17 to give patient 0.1mL of 1:10 of all three injections.  Injections given:  0.15mL of Yellow 1:10 given SQ in Upper Left arm 1 of 3.  Tolerated well.    0.15mL of yellow 1:10 given SQ in Upper Right arm 2 of 3..  Tolerated well    0.15mL of yellow 1:10 given SQ in Lower Left arm 3 of 3.  Tolerated well.    Patient assessed after 30 minutes.    1/3 left arm, 1+ reaction.  2/3 right arm, 0 reaction.  3/3 lower left arm, 1+ reaction.    Patient voiced no complaints.

## 2017-11-27 ENCOUNTER — PATIENT MESSAGE (OUTPATIENT)
Dept: INTERNAL MEDICINE | Facility: CLINIC | Age: 47
End: 2017-11-27

## 2017-11-29 ENCOUNTER — TELEPHONE (OUTPATIENT)
Dept: ALLERGY | Facility: CLINIC | Age: 47
End: 2017-11-29

## 2017-12-06 ENCOUNTER — CLINICAL SUPPORT (OUTPATIENT)
Dept: INTERNAL MEDICINE | Facility: CLINIC | Age: 47
End: 2017-12-06
Payer: COMMERCIAL

## 2017-12-06 DIAGNOSIS — J30.9 CHRONIC ALLERGIC RHINITIS, UNSPECIFIED SEASONALITY, UNSPECIFIED TRIGGER: ICD-10-CM

## 2017-12-06 PROCEDURE — 99499 UNLISTED E&M SERVICE: CPT | Mod: S$GLB,,, | Performed by: ALLERGY & IMMUNOLOGY

## 2017-12-06 PROCEDURE — 95117 IMMUNOTHERAPY INJECTIONS: CPT | Mod: S$GLB,,, | Performed by: FAMILY MEDICINE

## 2017-12-06 NOTE — PROGRESS NOTES
Pt states, no changes in meds, no problems with last injection.  Verbal order from Dr. Mujica 11/15/17 to give patient 0.1mL of 1:10 of all three injections.  Injections given:  0.1mL of Yellow 1:10 given SQ in Upper Left arm 1 of 3.  Tolerated well.    0.1mL of yellow 1:10 given SQ in Lower left  arm 2 of 3..  Tolerated well    0.1mL of yellow 1:10 given SQ in Upper Right arm 3 of 3.  Tolerated well.    Patient assessed after 30 minutes.    1/3 left arm, 2+ reaction.  2/3 right arm, 0 reaction.  3/3 lower left arm, 0 reaction.    Patient voiced no complaints.

## 2017-12-22 ENCOUNTER — CLINICAL SUPPORT (OUTPATIENT)
Dept: INTERNAL MEDICINE | Facility: CLINIC | Age: 47
End: 2017-12-22
Payer: COMMERCIAL

## 2017-12-22 DIAGNOSIS — J30.9 CHRONIC ALLERGIC RHINITIS, UNSPECIFIED SEASONALITY, UNSPECIFIED TRIGGER: ICD-10-CM

## 2017-12-22 PROCEDURE — 95117 IMMUNOTHERAPY INJECTIONS: CPT | Mod: S$GLB,,, | Performed by: FAMILY MEDICINE

## 2017-12-22 PROCEDURE — 99499 UNLISTED E&M SERVICE: CPT | Mod: S$GLB,,, | Performed by: ALLERGY & IMMUNOLOGY

## 2017-12-22 NOTE — PROGRESS NOTES
Pt states, no changes in meds, no problems with last injection.    Injections given:  0.1mL of Yellow 1:10 given SQ in Upper Left arm 1 of 3.  Tolerated well.    0.1mL of yellow 1:10 given SQ in Lower left  arm 2 of 3..  Tolerated well    0.1mL of yellow 1:10 given SQ in Upper Right arm 3 of 3.  Tolerated well.    Patient assessed after 30 minutes.    1/3 left arm,0 reaction.  2/3 right arm, 0 reaction.  3/3 lower left arm, 0 reaction.    Patient voiced no complaints.

## 2017-12-27 ENCOUNTER — CLINICAL SUPPORT (OUTPATIENT)
Dept: INTERNAL MEDICINE | Facility: CLINIC | Age: 47
End: 2017-12-27
Payer: COMMERCIAL

## 2017-12-27 DIAGNOSIS — J30.9 CHRONIC ALLERGIC RHINITIS, UNSPECIFIED SEASONALITY, UNSPECIFIED TRIGGER: ICD-10-CM

## 2017-12-27 PROCEDURE — 99499 UNLISTED E&M SERVICE: CPT | Mod: S$GLB,,, | Performed by: ALLERGY & IMMUNOLOGY

## 2017-12-27 PROCEDURE — 95117 IMMUNOTHERAPY INJECTIONS: CPT | Mod: S$GLB,,, | Performed by: INTERNAL MEDICINE

## 2017-12-27 NOTE — PROGRESS NOTES
Pt states, no changes in meds, no problems with last injection.    Injections given:  0.15mL of Yellow 1:10 given SQ in Upper Left arm 1 of 3.  Tolerated well.    0.15mL of yellow 1:10 given SQ in Lower left  arm 2 of 3..  Tolerated well    0.15mL of yellow 1:10 given SQ in Upper Right arm 3 of 3.  Tolerated well.    Patient assessed after 30 minutes.    1/3 left arm,1+ reaction.  2/3 right arm, 0 reaction.  3/3 lower left arm, 0 reaction.    Patient voiced no complaints.

## 2018-01-09 ENCOUNTER — TELEPHONE (OUTPATIENT)
Dept: ALLERGY | Facility: CLINIC | Age: 48
End: 2018-01-09

## 2018-01-09 ENCOUNTER — PATIENT MESSAGE (OUTPATIENT)
Dept: ALLERGY | Facility: CLINIC | Age: 48
End: 2018-01-09

## 2018-01-09 DIAGNOSIS — L50.1 CHRONIC IDIOPATHIC URTICARIA: Primary | ICD-10-CM

## 2018-01-09 NOTE — TELEPHONE ENCOUNTER
Called patient after showing Dr. Mujica photo patient sent this a.m.  He suggested she up her dose of Zyrtec to 40mg /day, two tabs a.m., two tabs p.m.  Usually that will resolve swelling.  Pt stated she also felt a little throat restriction, Dr. Mujica said Zyrtec should resolve.  Ask patient not to come tomorrow to get allergy injection.  Also asked her to call us if her symptoms do not improve and if she has anything more than minor throat discomfort, please go to ED.  Patient expressed understanding

## 2018-01-09 NOTE — TELEPHONE ENCOUNTER
----- Message from Naldo Shelby sent at 1/9/2018 11:23 AM CST -----  Contact: self [phone]  Patient requesting a call from the office to discuss getting an allergy appointment today . Please call and advise, Thanks

## 2018-01-10 ENCOUNTER — PATIENT MESSAGE (OUTPATIENT)
Dept: ALLERGY | Facility: CLINIC | Age: 48
End: 2018-01-10

## 2018-01-10 RX ORDER — MONTELUKAST SODIUM 10 MG/1
10 TABLET ORAL NIGHTLY
Qty: 30 TABLET | Refills: 6 | Status: SHIPPED | OUTPATIENT
Start: 2018-01-10 | End: 2018-02-09

## 2018-01-17 ENCOUNTER — OFFICE VISIT (OUTPATIENT)
Dept: ALLERGY | Facility: CLINIC | Age: 48
End: 2018-01-17
Payer: COMMERCIAL

## 2018-01-17 VITALS — DIASTOLIC BLOOD PRESSURE: 70 MMHG | SYSTOLIC BLOOD PRESSURE: 100 MMHG | HEART RATE: 70 BPM | HEIGHT: 61 IN

## 2018-01-17 DIAGNOSIS — Z88.0 H/O PENICILLIN-TYPE ANTIBIOTIC ALLERGY: ICD-10-CM

## 2018-01-17 DIAGNOSIS — Z88.6 HISTORY OF ALLERGY TO ASPIRIN: ICD-10-CM

## 2018-01-17 DIAGNOSIS — J30.1 CHRONIC SEASONAL ALLERGIC RHINITIS DUE TO POLLEN: ICD-10-CM

## 2018-01-17 DIAGNOSIS — L50.1 CHRONIC IDIOPATHIC URTICARIA: Primary | ICD-10-CM

## 2018-01-17 DIAGNOSIS — Z91.013 SEAFOOD ALLERGY: ICD-10-CM

## 2018-01-17 DIAGNOSIS — T78.1XXD POLLEN-FOOD ALLERGY, SUBSEQUENT ENCOUNTER: ICD-10-CM

## 2018-01-17 PROCEDURE — 99999 PR PBB SHADOW E&M-EST. PATIENT-LVL III: CPT | Mod: PBBFAC,,, | Performed by: ALLERGY & IMMUNOLOGY

## 2018-01-17 PROCEDURE — 99214 OFFICE O/P EST MOD 30 MIN: CPT | Mod: S$GLB,,, | Performed by: ALLERGY & IMMUNOLOGY

## 2018-01-17 RX ORDER — EPINEPHRINE 0.3 MG/.3ML
1 INJECTION SUBCUTANEOUS ONCE
Qty: 2 DEVICE | Refills: 1 | Status: SHIPPED | OUTPATIENT
Start: 2018-01-17 | End: 2018-07-19

## 2018-01-17 NOTE — PROGRESS NOTES
"ALLERGY & IMMUNOLOGY CLINIC - FOLLOW UP     HISTORY OF PRESENT ILLNESS     Patient ID: Shae Trotter is a 47 y.o. female    CC: follow up    HPI: 46 yo woman with a history of the followin. Chronic urticaria for many years, manifesting with a large bout of hives several times per year, each bout lasts for 2-6 weeks at a time.   2. Allergic rhinitis, on AIT, which has been held for urticaria flare.  3. Oral allergy syndrome to avocado and other fruits  4. 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 Drug allergies to amoxicillin (facial swelling and hives in ) and aspirin (hives)    Chronic urticaria flared recently, she called clinic and was started on singulair one week ago. Also taking zyrtec 10 mg BID and zantac 150 mg BID. Has never been on xolair. Hives have decreased in severity since starting singulair, and today they are gone.    Ate shrimp last month and within ten minutes, felt the sensation of mouth swelling one month ago. Took benadryl and had relief. Has an epi pen, but she thinks hers is .       REVIEW OF SYSTEMS     CONST: no F/C/NS, no unintentional weight changes  NEURO: no H/A, no weakness, no paresthesias  EYES: no discharge, no pruritus, no erythema  EARS: no hearing loss, no sensation of fullness  NOSE: + congestion, no rhinorrhea, no discharge, no itching, no sneezing  PULM: no SOB, no wheezing, no cough, no snoring  CV: no CP, no palpitations, no leg swelling  GI: no dysphagia, no heartburn, no pain, no N/V/D, no BRBPR/melena  URO: no dysuria, no hematuria, no nocturia  MSK: no joint pain, no muscle pain  DERM: no rashes, no skin breaks     MEDICAL HISTORY     MedHx: active problems reviewed     PHYSICAL EXAM     VS: /70 (BP Location: Right arm)   Pulse 70   Ht 5' 1" (1.549 m)   LMP 07/10/2012   GENERAL: AAOx4, NAD, well-appearing, cooperative  EYES: PERRL, EOMI, no conjunctival injection, no discharge, no infraorbital shiners  EARS: external auditory " canals normal B/L, TM normal B/L  NOSE: NT 2+ and pale pink B/L, stringing mucous, no polyps  ORAL: MMM, no ulcers, no thrush, no cobblestoning  NECK: supple, trachea midline, no thyromegaly, no LAD  LUNGS: CTAB, no w/r/c, no increased WOB  HEART: RRR, normal S1/S2, no m/g/r  EXTREMITIES: +2 distal pulses, no c/c/e  LYMPHATICS: no cervical/submandibular LAD  DERM: no rashes, no skin breaks, no dystrophic fingernails  NEURO: normal gait, no facial asymmetry     LABORATORY STUDIES     2017 labs (CBC, CMP, TSH) all ok     ALLERGEN TESTING     Skin Prick: done previously, multiple positives     IMAGING & OTHER DIAGNOSTICS     CXR  normal     ASSESSMENT & PLAN     Shae Trotter is a 47 y.o. female with     1. Chronic urticaria for many years, manifesting with a large bout of hives several times per year, each bout lasts for 2-6 weeks at a time.    -ROS, PE and most recent labs reassuring   -Continue cetirizine up to 40 mg daily, ranitidine BID, and montelukast   -We discussed alternative therapies such as doxepin, xolair - together we decided to stick with cetirizine, ranitidine and montelukast.   2. Allergic rhinitis, on AIT, which has been held for urticaria flare.   -OK to resume  3. Oral allergy syndrome to avocado and other fruits   -Avoid if she desires, or eat fruits in cooked form  4. 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 Drug allergies to amoxicillin (facial swelling and hives in ) and aspirin (hives)   -Can consider PCN skin testing in the future.     Follow up in 6 months or sooner if needed.     Toya Quiroga MD  Allergy / Immunology Fellow

## 2018-01-19 ENCOUNTER — CLINICAL SUPPORT (OUTPATIENT)
Dept: INTERNAL MEDICINE | Facility: CLINIC | Age: 48
End: 2018-01-19
Payer: COMMERCIAL

## 2018-01-19 DIAGNOSIS — J30.9 CHRONIC ALLERGIC RHINITIS, UNSPECIFIED SEASONALITY, UNSPECIFIED TRIGGER: ICD-10-CM

## 2018-01-19 PROCEDURE — 99499 UNLISTED E&M SERVICE: CPT | Mod: S$GLB,,, | Performed by: ALLERGY & IMMUNOLOGY

## 2018-01-19 PROCEDURE — 95117 IMMUNOTHERAPY INJECTIONS: CPT | Mod: S$GLB,,, | Performed by: FAMILY MEDICINE

## 2018-01-19 NOTE — PROGRESS NOTES
Pt states, no changes in meds, no problems with last injection.  Patient's dosage was backed up by physician due to breakout of hives, unrelated to allergy injection.  Injections given:  0.10mL of Yellow 1:10 given SQ in Upper Left arm 1 of 3.  Tolerated well.    0.10mL of yellow 1:10 given SQ in Lower left  arm 2 of 3..  Tolerated well    0.1ommL of yellow 1:10 given SQ in Upper Right arm 3 of 3.  Tolerated well.    Patient assessed after 30 minutes.    1/3 left arm,1+ reaction.  2/3 right arm, 0 reaction.  3/3 lower left arm, 0 reaction.    Patient voiced no complaints.

## 2018-02-12 ENCOUNTER — CLINICAL SUPPORT (OUTPATIENT)
Dept: INTERNAL MEDICINE | Facility: CLINIC | Age: 48
End: 2018-02-12
Payer: COMMERCIAL

## 2018-02-12 DIAGNOSIS — J30.9 CHRONIC ALLERGIC RHINITIS, UNSPECIFIED SEASONALITY, UNSPECIFIED TRIGGER: ICD-10-CM

## 2018-02-12 PROCEDURE — 95117 IMMUNOTHERAPY INJECTIONS: CPT | Mod: S$GLB,,, | Performed by: FAMILY MEDICINE

## 2018-02-12 PROCEDURE — 99499 UNLISTED E&M SERVICE: CPT | Mod: S$GLB,,, | Performed by: ALLERGY & IMMUNOLOGY

## 2018-02-12 NOTE — PROGRESS NOTES
Pt states, no changes in meds, no problems with last injection.  Patient's dosage was backed up due to length of time since last injection.  Protocol states back up to 0.5mL of blue 1:100.  We did not have a blue 1:100 extract for this patient.  Backed patient up to 0.05mL of Yellow 1:10 on all three vials.    0.05mL of Yellow 1:10 given SQ in Upper Left arm 1 of 3.  Tolerated well.    0.05mL of yellow 1:10 given SQ in Lower left  arm 2 of 3..  Tolerated well    0.05mL of yellow 1:10 given SQ in Upper Right arm 3 of 3.  Tolerated well.    Patient assessed after 30 minutes.    1/3 left arm,0 reaction.  2/3 right arm, 0 reaction.  3/3 lower left arm, 0 reaction.    Patient voiced no complaints.

## 2018-02-21 ENCOUNTER — CLINICAL SUPPORT (OUTPATIENT)
Dept: INTERNAL MEDICINE | Facility: CLINIC | Age: 48
End: 2018-02-21
Payer: COMMERCIAL

## 2018-02-21 DIAGNOSIS — J30.2 CHRONIC SEASONAL ALLERGIC RHINITIS DUE TO OTHER ALLERGEN: ICD-10-CM

## 2018-02-21 PROCEDURE — 99499 UNLISTED E&M SERVICE: CPT | Mod: S$GLB,,, | Performed by: ALLERGY & IMMUNOLOGY

## 2018-02-21 PROCEDURE — 95117 IMMUNOTHERAPY INJECTIONS: CPT | Mod: S$GLB,,, | Performed by: FAMILY MEDICINE

## 2018-02-26 NOTE — PROGRESS NOTES
Pt states, no changes in meds, no problems with last injection.  Patient's dosage was backed up due to length of time since last injection.  Protocol states back up to 0.5mL of blue 1:100.  We did not have a blue 1:100 extract for this patient.  Backed patient up to 0.05mL of Yellow 1:10 on all three vials.    0.1mL of Yellow 1:10 given SQ in Upper Left arm 1 of 3.  Tolerated well.    0.1mL of yellow 1:10 given SQ in Lower left  arm 2 of 3..  Tolerated well    0.1mL of yellow 1:10 given SQ in Upper Right arm 3 of 3.  Tolerated well.    Patient assessed after 30 minutes.    1/3 left arm,1+reaction.  2/3 right arm, 1+reaction.  3/3 lower left arm, 1+ reaction.    Patient voiced no complaints.

## 2018-02-28 ENCOUNTER — CLINICAL SUPPORT (OUTPATIENT)
Dept: INTERNAL MEDICINE | Facility: CLINIC | Age: 48
End: 2018-02-28
Payer: COMMERCIAL

## 2018-02-28 DIAGNOSIS — J30.1 ALLERGIC RHINITIS DUE TO POLLEN, UNSPECIFIED CHRONICITY, UNSPECIFIED SEASONALITY: ICD-10-CM

## 2018-02-28 PROCEDURE — 95117 IMMUNOTHERAPY INJECTIONS: CPT | Mod: S$GLB,,, | Performed by: FAMILY MEDICINE

## 2018-02-28 PROCEDURE — 99499 UNLISTED E&M SERVICE: CPT | Mod: S$GLB,,, | Performed by: ALLERGY & IMMUNOLOGY

## 2018-02-28 NOTE — PROGRESS NOTES
Pt states, no changes in meds, no problems with last injection.      0.15mL of Yellow 1:10 given SQ in Upper Left arm 1 of 3.  Tolerated well.    0.15mL of yellow 1:10 given SQ in Lower left  arm 2 of 3..  Tolerated well    0.15mL of yellow 1:10 given SQ in Upper Right arm 3 of 3.  Tolerated well.    Patient assessed after 30 minutes.    0 reaction at all three sites.    Patient voiced no complaints.

## 2018-03-09 ENCOUNTER — CLINICAL SUPPORT (OUTPATIENT)
Dept: INTERNAL MEDICINE | Facility: CLINIC | Age: 48
End: 2018-03-09
Payer: COMMERCIAL

## 2018-03-09 DIAGNOSIS — J30.1 ALLERGIC RHINITIS DUE TO POLLEN, UNSPECIFIED CHRONICITY, UNSPECIFIED SEASONALITY: ICD-10-CM

## 2018-03-09 PROCEDURE — 99499 UNLISTED E&M SERVICE: CPT | Mod: S$GLB,,, | Performed by: ALLERGY & IMMUNOLOGY

## 2018-03-09 PROCEDURE — 95117 IMMUNOTHERAPY INJECTIONS: CPT | Mod: S$GLB,,, | Performed by: INTERNAL MEDICINE

## 2018-03-09 NOTE — PROGRESS NOTES
Pt states, no changes in meds, no problems with last injection.      0.2mL of Yellow 1:10 given SQ in Upper Left arm 1 of 3.  Tolerated well.    0.2mL of yellow 1:10 given SQ in Lower left  arm 2 of 3..  Tolerated well    0.2mL of yellow 1:10 given SQ in Upper Right arm 3 of 3.  Tolerated well.    Patient assessed after 30 minutes.    1+ reaction at 1/3 (upper left).  All other sites, 0 reaction.    Patient voiced no complaints.

## 2018-04-02 ENCOUNTER — CLINICAL SUPPORT (OUTPATIENT)
Dept: INTERNAL MEDICINE | Facility: CLINIC | Age: 48
End: 2018-04-02
Payer: COMMERCIAL

## 2018-04-02 DIAGNOSIS — J30.1 ACUTE ALLERGIC RHINITIS DUE TO POLLEN, UNSPECIFIED SEASONALITY: ICD-10-CM

## 2018-04-02 PROCEDURE — 95117 IMMUNOTHERAPY INJECTIONS: CPT | Mod: S$GLB,,, | Performed by: FAMILY MEDICINE

## 2018-04-02 PROCEDURE — 99499 UNLISTED E&M SERVICE: CPT | Mod: S$GLB,,, | Performed by: ALLERGY & IMMUNOLOGY

## 2018-04-02 NOTE — PROGRESS NOTES
Pt states, no changes in meds, no problems with last injection.      0.3mL of Yellow 1:10 given SQ in Upper Left arm 1 of 3.  Tolerated well.    0.3mmL of yellow 1:10 given SQ in Lower left  arm 2 of 3..  Tolerated well    0.3mL of yellow 1:10 given SQ in Upper Right arm 3 of 3.  Tolerated well.    Patient assessed after 30 minutes.    0 reaction at all sites.    Patient voiced no complaints.

## 2018-04-27 ENCOUNTER — CLINICAL SUPPORT (OUTPATIENT)
Dept: INTERNAL MEDICINE | Facility: CLINIC | Age: 48
End: 2018-04-27
Payer: COMMERCIAL

## 2018-04-27 DIAGNOSIS — J30.1 ALLERGIC RHINITIS DUE TO POLLEN, UNSPECIFIED SEASONALITY: ICD-10-CM

## 2018-04-27 PROCEDURE — 95117 IMMUNOTHERAPY INJECTIONS: CPT | Mod: S$GLB,,, | Performed by: INTERNAL MEDICINE

## 2018-04-27 PROCEDURE — 99499 UNLISTED E&M SERVICE: CPT | Mod: S$GLB,,, | Performed by: ALLERGY & IMMUNOLOGY

## 2018-04-27 NOTE — PROGRESS NOTES
Pt states, no changes in meds, no problems with last injection.  Backed up due to length of time since last injection, 21 days.    0.25mL of Yellow 1:10 given SQ in Upper Left arm 1 of 3.  Tolerated well.    0.25mmL of yellow 1:10 given SQ in Lower left  arm 2 of 3..  Tolerated well    0.25mL of yellow 1:10 given SQ in Upper Right arm 3 of 3.  Tolerated well.    Patient assessed after 30 minutes.    0 reaction at all sites.    Patient voiced no complaints.

## 2018-05-09 ENCOUNTER — CLINICAL SUPPORT (OUTPATIENT)
Dept: INTERNAL MEDICINE | Facility: CLINIC | Age: 48
End: 2018-05-09
Payer: COMMERCIAL

## 2018-05-09 DIAGNOSIS — J30.1 ALLERGIC RHINITIS DUE TO POLLEN, UNSPECIFIED SEASONALITY: ICD-10-CM

## 2018-05-09 PROCEDURE — 95117 IMMUNOTHERAPY INJECTIONS: CPT | Mod: S$GLB,,, | Performed by: FAMILY MEDICINE

## 2018-05-09 PROCEDURE — 99499 UNLISTED E&M SERVICE: CPT | Mod: S$GLB,,, | Performed by: ALLERGY & IMMUNOLOGY

## 2018-05-16 ENCOUNTER — CLINICAL SUPPORT (OUTPATIENT)
Dept: INTERNAL MEDICINE | Facility: CLINIC | Age: 48
End: 2018-05-16
Payer: COMMERCIAL

## 2018-05-16 DIAGNOSIS — J30.2 SEASONAL ALLERGIC RHINITIS, UNSPECIFIED TRIGGER: ICD-10-CM

## 2018-05-16 PROCEDURE — 99499 UNLISTED E&M SERVICE: CPT | Mod: S$GLB,,, | Performed by: ALLERGY & IMMUNOLOGY

## 2018-05-16 PROCEDURE — 95117 IMMUNOTHERAPY INJECTIONS: CPT | Mod: S$GLB,,, | Performed by: FAMILY MEDICINE

## 2018-05-16 NOTE — PROGRESS NOTES
Pt states, no changes in meds, no problems with last injection.   0.35mL of Yellow 1:10 given SQ in Upper Left arm 1 of 3.  Tolerated well.    0.35mL of yellow 1:10 given SQ in Lower left  arm 2 of 3..  Tolerated well    0.35mL of yellow 1:10 given SQ in Upper Right arm 3 of 3.  Tolerated well.    Patient assessed after 30 minutes.    0 reaction at all sites.    Patient voiced no complaints.

## 2018-05-22 ENCOUNTER — TELEPHONE (OUTPATIENT)
Dept: OPTOMETRY | Facility: CLINIC | Age: 48
End: 2018-05-22

## 2018-05-23 ENCOUNTER — CLINICAL SUPPORT (OUTPATIENT)
Dept: INTERNAL MEDICINE | Facility: CLINIC | Age: 48
End: 2018-05-23
Payer: COMMERCIAL

## 2018-05-23 DIAGNOSIS — J30.9 ALLERGIC RHINITIS, UNSPECIFIED SEASONALITY, UNSPECIFIED TRIGGER: ICD-10-CM

## 2018-05-23 DIAGNOSIS — J30.2 SEASONAL ALLERGIC RHINITIS, UNSPECIFIED TRIGGER: ICD-10-CM

## 2018-05-23 PROCEDURE — 99499 UNLISTED E&M SERVICE: CPT | Mod: S$GLB,,, | Performed by: FAMILY MEDICINE

## 2018-05-23 PROCEDURE — 95117 IMMUNOTHERAPY INJECTIONS: CPT | Mod: S$GLB,,, | Performed by: FAMILY MEDICINE

## 2018-05-23 NOTE — PROGRESS NOTES
Pt states, no changes in meds, no problems with last injection.   0.4 mL of Yellow 1:10 given SQ in Upper Left arm 1 of 3.  Tolerated well. Patient had 1+ reaction to 1 of 3    0.4 mL of yellow 1:10 given SQ in Lower left  arm 2 of 3..  Tolerated well. Patient had no reaction to 2 of 3    0.4mL of yellow 1:10 given SQ in Upper Right arm 3 of 3.  Tolerated well. Patient had 1+ reaction to 3 of 3    Patient assessed after 30 minutes.        Patient voiced no complaints.

## 2018-05-28 ENCOUNTER — OFFICE VISIT (OUTPATIENT)
Dept: OPTOMETRY | Facility: CLINIC | Age: 48
End: 2018-05-28
Payer: COMMERCIAL

## 2018-05-28 DIAGNOSIS — H52.13 MYOPIA WITH PRESBYOPIA OF BOTH EYES: Primary | ICD-10-CM

## 2018-05-28 DIAGNOSIS — H52.4 MYOPIA WITH PRESBYOPIA OF BOTH EYES: Primary | ICD-10-CM

## 2018-05-28 PROCEDURE — 99999 PR PBB SHADOW E&M-EST. PATIENT-LVL II: CPT | Mod: PBBFAC,,, | Performed by: OPTOMETRIST

## 2018-05-28 PROCEDURE — 99499 UNLISTED E&M SERVICE: CPT | Mod: S$GLB,,, | Performed by: OPTOMETRIST

## 2018-05-28 PROCEDURE — 92310 CONTACT LENS FITTING OU: CPT | Mod: S$GLB,,, | Performed by: OPTOMETRIST

## 2018-05-28 PROCEDURE — 92014 COMPRE OPH EXAM EST PT 1/>: CPT | Mod: S$GLB,,, | Performed by: OPTOMETRIST

## 2018-05-28 NOTE — PROGRESS NOTES
Assessment /Plan     For exam results, see Encounter Report.    Myopia with presbyopia of both eyes      1. See routine exam same date for clfit.

## 2018-05-28 NOTE — PROGRESS NOTES
EILEEN LOVING 01/2016  Had glasses made a couple years ago.  Doesn't wear, didn't   seem to do much good and had trouble adjusting to progressive lens. Would   like correction for distance and near. May be interested in contacts, has   never worn before.    Last edited by Tea Boggs on 5/28/2018  9:04 AM. (History)            Assessment /Plan     For exam results, see Encounter Report.    Myopia with presbyopia of both eyes      1.  Contact lens trials dispensed to pt. Daily wear only advised, with education to risks of extended wear.  Discussed lens care, compliance and solutions.  RTC 2 weeks contact lens follow up.

## 2018-06-06 ENCOUNTER — CLINICAL SUPPORT (OUTPATIENT)
Dept: INTERNAL MEDICINE | Facility: CLINIC | Age: 48
End: 2018-06-06
Payer: COMMERCIAL

## 2018-06-06 DIAGNOSIS — J30.9 ALLERGIC RHINITIS, UNSPECIFIED SEASONALITY, UNSPECIFIED TRIGGER: ICD-10-CM

## 2018-06-06 DIAGNOSIS — J30.2 SEASONAL ALLERGIC RHINITIS, UNSPECIFIED TRIGGER: ICD-10-CM

## 2018-06-06 PROCEDURE — 99499 UNLISTED E&M SERVICE: CPT | Mod: S$GLB,,, | Performed by: STUDENT IN AN ORGANIZED HEALTH CARE EDUCATION/TRAINING PROGRAM

## 2018-06-06 PROCEDURE — 95117 IMMUNOTHERAPY INJECTIONS: CPT | Mod: S$GLB,,, | Performed by: FAMILY MEDICINE

## 2018-06-06 NOTE — PROGRESS NOTES
Pt states, no changes in meds, no problems with last injection.   0.4 mL of Yellow 1:10 given SQ in Upper Left arm 1 of 3.  Tolerated well. Patient had no reaction to 1 of 3    0.4 mL of yellow 1:10 given SQ in Lower left  arm 2 of 3..  Tolerated well. Patient had no reaction to 2 of 3    0.4mL of yellow 1:10 given SQ in Upper Right arm 3 of 3.  Tolerated well.Patient had no reaction to 3 of 3    Patient assessed after 30 minutes.        Patient voiced no complaints.

## 2018-06-27 ENCOUNTER — CLINICAL SUPPORT (OUTPATIENT)
Dept: INTERNAL MEDICINE | Facility: CLINIC | Age: 48
End: 2018-06-27
Payer: COMMERCIAL

## 2018-06-27 DIAGNOSIS — J30.1 ALLERGIC RHINITIS DUE TO POLLEN, UNSPECIFIED SEASONALITY: ICD-10-CM

## 2018-06-27 PROCEDURE — 95117 IMMUNOTHERAPY INJECTIONS: CPT | Mod: S$GLB,,, | Performed by: FAMILY MEDICINE

## 2018-06-27 PROCEDURE — 99499 UNLISTED E&M SERVICE: CPT | Mod: S$GLB,,, | Performed by: ALLERGY & IMMUNOLOGY

## 2018-06-27 NOTE — PROGRESS NOTES
Pt states, no changes in meds, no problems with last injection.   0.45 mL of Yellow 1:10 given SQ in Upper Left arm 1 of 3.  Tolerated well. Patient had 1+ reaction to 1 of 3    0.45 mL of yellow 1:10 given SQ in Upper right  arm 2 of 3..  Tolerated well. Patient had no reaction to 2 of 3    0.45mL of yellow 1:10 given SQ in Lower Right arm 3 of 3.  Tolerated well.Patient had no reaction to 3 of 3    Patient assessed after 30 minutes.        Patient voiced no complaints.

## 2018-07-11 ENCOUNTER — CLINICAL SUPPORT (OUTPATIENT)
Dept: INTERNAL MEDICINE | Facility: CLINIC | Age: 48
End: 2018-07-11
Payer: COMMERCIAL

## 2018-07-11 DIAGNOSIS — J30.1 SEASONAL ALLERGIC RHINITIS DUE TO POLLEN: ICD-10-CM

## 2018-07-11 PROCEDURE — 95117 IMMUNOTHERAPY INJECTIONS: CPT | Mod: S$GLB,,, | Performed by: FAMILY MEDICINE

## 2018-07-11 PROCEDURE — 99499 UNLISTED E&M SERVICE: CPT | Mod: S$GLB,,, | Performed by: ALLERGY & IMMUNOLOGY

## 2018-07-11 NOTE — PROGRESS NOTES
Pt states, no changes in meds, no problems with last injection. Called Dr. Mujica to let him know not enough extract in vial to give 0.5mL of Yellow.  He approved 0.05mL of Red 1:1 of all three vaccines.    0.05mL of Red 1:1  given SQ in Upper Left arm 1 of 3.  Tolerated well. +1 Wheal reaction noted.    0.05mL of Red 1:1 given SQ in Left lower arm.  Tolerated well. +1 wheal reaction noted.    0.05mL of Red 1:1 given SQ in Upper Right arm 3 of 3.  Tolerated well.  0 reaction noted.    No complaints voiced.

## 2018-07-18 ENCOUNTER — PATIENT MESSAGE (OUTPATIENT)
Dept: INTERNAL MEDICINE | Facility: CLINIC | Age: 48
End: 2018-07-18

## 2018-07-19 ENCOUNTER — PATIENT MESSAGE (OUTPATIENT)
Dept: OPTOMETRY | Facility: CLINIC | Age: 48
End: 2018-07-19

## 2018-07-19 ENCOUNTER — OFFICE VISIT (OUTPATIENT)
Dept: FAMILY MEDICINE | Facility: CLINIC | Age: 48
End: 2018-07-19
Payer: COMMERCIAL

## 2018-07-19 VITALS
OXYGEN SATURATION: 94 % | HEIGHT: 61 IN | TEMPERATURE: 98 F | WEIGHT: 119.06 LBS | SYSTOLIC BLOOD PRESSURE: 94 MMHG | HEART RATE: 92 BPM | BODY MASS INDEX: 22.48 KG/M2 | DIASTOLIC BLOOD PRESSURE: 65 MMHG

## 2018-07-19 DIAGNOSIS — S39.012A STRAIN OF LUMBAR REGION, INITIAL ENCOUNTER: Primary | ICD-10-CM

## 2018-07-19 PROCEDURE — 3008F BODY MASS INDEX DOCD: CPT | Mod: CPTII,S$GLB,, | Performed by: FAMILY MEDICINE

## 2018-07-19 PROCEDURE — 99999 PR PBB SHADOW E&M-EST. PATIENT-LVL III: CPT | Mod: PBBFAC,,, | Performed by: FAMILY MEDICINE

## 2018-07-19 PROCEDURE — 99214 OFFICE O/P EST MOD 30 MIN: CPT | Mod: S$GLB,,, | Performed by: FAMILY MEDICINE

## 2018-07-19 RX ORDER — TIZANIDINE 2 MG/1
2 TABLET ORAL 3 TIMES DAILY PRN
Qty: 30 TABLET | Refills: 0 | Status: SHIPPED | OUTPATIENT
Start: 2018-07-19 | End: 2018-11-27

## 2018-07-19 NOTE — PATIENT INSTRUCTIONS
Aleve: 2 pills twice daily as needed    Or     Advil up to 4 pills three times daily as needed.        LOW BACK PAIN EXERCISES    Exercises that stretch and strengthen the muscles of your abdomen and spine can help prevent back problems. Strong back and abdominal muscles help you keep good posture, with your spine in its correct position.  If your muscles are tight, take a warm shower or bath before doing the exercises. Exercise on a rug or mat. Wear loose clothing. Dont wear shoes. Stop doing any exercise that causes pain until you have talked with your healthcare provider.  Ask your provider or physical therapist to help you develop an exercise program. Ask your provider how many times a week you need to do the exercises. Remember to start slowly.   Exercises  These exercises are intended only as suggestions. Be sure to check with your provider before starting the exercises.   Standing hamstring stretch: Put the heel of one leg on a stool about 15 inches high. Keep your leg straight. Lean forward, bending at the hips until you feel a mild stretch in the back of your thigh. Make sure you do not roll your shoulders or bend at the waist when doing this. You want to stretch your leg, not your lower back. Hold the stretch for 15 to 30 seconds. Repeat with each leg 3 times.   Cat and camel: Get down on your hands and knees. Let your stomach sag, allowing your back to curve downward. Hold this position for 5 seconds. Then arch your back and hold for 5 seconds. Do 2 sets of 15.   Quadruped arm and leg raise: Get down on your hands and knees. Pull in your belly button and tighten your abdominal muscles to stiffen your spine. While keeping your abdominals tight, raise one arm and the opposite leg away from you. Hold this position for 5 seconds. Lower your arm and leg slowly and change sides. Do this 10 times on each side.   Pelvic tilt: Lie on your back with your knees bent and your feet flat on the floor. Pull your belly  button in towards your spine and push your lower back into the floor, flattening your back. Hold this position for 15 seconds, then relax. Repeat 5 to 10 times.   Partial curl: Lie on your back with your knees bent and your feet flat on the floor. Draw in your abdomen and tighten your stomach muscles. With your hands stretched out in front of you, curl your upper body forward until your shoulders clear the floor. Hold this position for 3 seconds. Don't hold your breath. It helps to breathe out as you lift your shoulders. Relax back to the floor. Repeat 10 times. Build to 2 sets of 15. To challenge yourself, clasp your hands behind your head and keep your elbows out to your sides.   Gluteal stretch: Lie on your back with both knees bent. Rest your right ankle over the knee of your left leg. Grasp the thigh of the left leg and pull toward your chest. You will feel a stretch along the buttocks and possibly along the outside of your hip. Hold the stretch for 15 to 30 seconds. Then repeat the exercise with your left ankle over your right knee. Do the exercise 3 times with each leg.   Extension exercise   Lie face down on the floor for 5 minutes. If this hurts too much, lie face down with a pillow under your stomach. This should relieve your leg or back pain. When you can lie on your stomach for 5 minutes without a pillow, you can continue with Part B of this exercise.   After lying on your stomach for 5 minutes, prop yourself up on your elbows for another 5 minutes. If you can do this without having more leg or buttock pain, you can start doing part C of this exercise.   Lie on your stomach with your hands under your shoulders. Then press down on your hands and extend your elbows while keeping your hips flat on the floor. Hold for 1 second and lower yourself to the floor. Do 3 to 5 sets of 10 repetitions. Rest for 1 minute between sets. You should have no pain in your legs when you do this, but it is normal to feel some  pain in your lower back.   Do this exercise several times a day.  Side plank: Lie on your side with your legs, hips, and shoulders in a straight line. Prop yourself up onto your forearm with your elbow directly under your shoulder. Lift your hips off the floor and balance on your forearm and the outside of your foot. Try to hold this position for 15 seconds and then slowly lower your hip to the ground. Switch sides and repeat. Work up to holding for 1 minute. This exercise can be made easier by starting with your knees and hips flexed toward your chest.            Back Exercises: Leg Pull        To start, lie on your back with your knees bent and feet flat on the floor. Dont press your neck or lower back to the floor. Breathe deeply. You should feel comfortable and relaxed in this position.  · Pull one knee to your chest.  · Hold for 30-60 seconds. Return to starting position.  · Repeat 2 times.  · Switch legs.  · For a double leg pull, pull both legs to your chest at the same time. Repeat 2 times.  For your safety, check with your healthcare provider before starting an exercise program.   © 6781-6596 [employer]. [address] [place] [address], [place] [address]. All rights reserved. This information is not intended as a substitute for professional medical care. Always follow your healthcare professional's instructions.

## 2018-07-19 NOTE — PROGRESS NOTES
(Portions of this note were dictated using voice recognition software and may contain dictation related errors in spelling/grammar/syntax not found on text review)    CC:   Chief Complaint   Patient presents with    Back Pain     ongoing since        HPI: 47 y.o. female 4 days of low back pain radiating to the left hip area.  No known trauma.  1 episode in the past but no chronic recurrent back issues.  No fever, chills, nausea, vomiting.  Medical history below.  Did try an old left over Soma that she had from an old prescription just to go to sleep last night.  Denies any bowel or bladder dysfunction.  No paresthesias.  Pain level is the same whether she sitting or standing or lying down or moving or still.  Started with a twinge in her back and that has progressively gotten worse.    Past Medical History:   Diagnosis Date    Allergy     taking allx shots since     Breast cancer 2006    breast cancer - tx with mastectomy, chemo - followed by Sunny Basurto    Genetic testing 2014    BRCA2 deleterious mutation    Other screening mammogram 2015    Seasonal allergies        Past Surgical History:   Procedure Laterality Date    COSMETIC SURGERY      right mastectomy after genetic testing returned    D&C hysterscope.      HYSTERECTOMY  4/13/15    Robotic LH/BSO     Left mastectomy and chemo Left 2006    TRAM flap reconstruction     MASTECTOMY Left 2006    NM REMOVAL OF OVARY/TUBE(S)      Right breast cysts removed      tram flap  Left 2006    breast reconstruction.        Family History   Problem Relation Age of Onset    Diabetes Mother     Hyperlipidemia Mother     Arrhythmia Mother     Cataracts Mother     Diabetes Father     Hyperlipidemia Father     Heart disease Father 64         2/2 to MI    No Known Problems Brother     No Known Problems Daughter     Allergic rhinitis Son     No Known Problems Brother     Ovarian cancer Neg Hx     Uterine cancer Neg Hx     Breast  "cancer Neg Hx     Colon cancer Neg Hx     Amblyopia Neg Hx     Blindness Neg Hx     Cancer Neg Hx     Glaucoma Neg Hx     Hypertension Neg Hx     Retinal detachment Neg Hx     Strabismus Neg Hx     Macular degeneration Neg Hx     Stroke Neg Hx     Thyroid disease Neg Hx        Social History     Social History    Marital status:      Spouse name: N/A    Number of children: N/A    Years of education: N/A     Occupational History          Social History Main Topics    Smoking status: Never Smoker    Smokeless tobacco: Never Used    Alcohol use No    Drug use: No    Sexual activity: Yes     Partners: Male     Birth control/ protection: Condom      Comment:  "Romeo"     Other Topics Concern    Not on file     Social History Narrative    From [facility]    Living [facility] with  and daughter and son    Not exercising reg       ROS:  GENERAL: No fever, chills, fatigability or weight loss.  SKIN: No rashes, no itching.  HEAD: No headaches.  EYES: No visual changes  EARS: No ear pain or changes in hearing.  NOSE: No congestion or rhinorrhea.  MOUTH & THROAT: No hoarseness, change in voice, or sore throat.  NODES: Denies swollen glands.  CHEST: Denies BRADEN, cyanosis, wheezing, cough and sputum production.  CARDIOVASCULAR: Denies chest pain, PND, orthopnea.  ABDOMEN: No nausea, vomiting, or changes in bowel function.  URINARY: No flank pain, dysuria or hematuria.  PERIPHERAL VASCULAR: No claudication or cyanosis.  MUSCULOSKELETAL:  Above  NEUROLOGIC: No weakness or numbness.    Vital signs reviewed  PE:   APPEARANCE: Well nourished, well developed, in no acute distress.    HEAD: Normocephalic, atraumatic.  EYES: PERRL. EOMI.   Conjunctivae noninjected.  CHEST: Good inspiratory effort. Lungs clear to auscultation with no wheezes or crackles.  CARDIOVASCULAR: Normal S1, S2. No rubs, murmurs, or gallops.  EXTREMITIES: No edema   NEURO/MSK:  Mild left paraspinous tenderness in the lumbar " spine.  Mild left sacroiliac tenderness. Negative straight leg raise bilaterally. 2+ patellar and ankle reflexes bilaterally. Negative Chester's test.  Normal hip range of motion.  Range of motion of the lumbar spine shows slight restriction of flexion secondary to pain. Normal extension.  Normal left bending but slightly restricted right bending secondary to pain.    IMPRESSION  1. Strain of lumbar region, initial encounter            PLAN  Discussed mechanical low back pain.  Trial of NSAIDs.  Aleve up to 2 pills twice a day as needed or Advil up to 4 pills 3 times a day as needed.  She would like to have a prescription muscle relaxant to have on hand secondary to stiffness especially at nighttime.  Will prescribe tizanidine 2 mg up to t.i.d. as necessary.  Can do heat application locally along with lumbar stretches which have been provided.  Notify if symptoms progressively worsen, or patient starts to notice significant neurological complications like numbness down her leg, weakness, bowel or bladder dysfunction

## 2018-07-20 ENCOUNTER — CLINICAL SUPPORT (OUTPATIENT)
Dept: FAMILY MEDICINE | Facility: CLINIC | Age: 48
End: 2018-07-20
Payer: COMMERCIAL

## 2018-07-20 DIAGNOSIS — M54.9 BACK PAIN, UNSPECIFIED BACK LOCATION, UNSPECIFIED BACK PAIN LATERALITY, UNSPECIFIED CHRONICITY: Primary | ICD-10-CM

## 2018-07-20 PROCEDURE — 96372 THER/PROPH/DIAG INJ SC/IM: CPT | Mod: S$GLB,,, | Performed by: FAMILY MEDICINE

## 2018-07-20 RX ORDER — KETOROLAC TROMETHAMINE 30 MG/ML
60 INJECTION, SOLUTION INTRAMUSCULAR; INTRAVENOUS
Status: COMPLETED | OUTPATIENT
Start: 2018-07-20 | End: 2018-07-20

## 2018-07-20 RX ADMIN — KETOROLAC TROMETHAMINE 60 MG: 30 INJECTION, SOLUTION INTRAMUSCULAR; INTRAVENOUS at 10:07

## 2018-07-25 ENCOUNTER — CLINICAL SUPPORT (OUTPATIENT)
Dept: INTERNAL MEDICINE | Facility: CLINIC | Age: 48
End: 2018-07-25
Payer: COMMERCIAL

## 2018-07-25 DIAGNOSIS — J30.1 SEASONAL ALLERGIC RHINITIS DUE TO POLLEN: ICD-10-CM

## 2018-07-25 PROCEDURE — 95117 IMMUNOTHERAPY INJECTIONS: CPT | Mod: S$GLB,,, | Performed by: INTERNAL MEDICINE

## 2018-07-25 PROCEDURE — 99499 UNLISTED E&M SERVICE: CPT | Mod: S$GLB,,, | Performed by: ALLERGY & IMMUNOLOGY

## 2018-07-25 NOTE — PROGRESS NOTES
Pt states, no changes in meds, no problems with last injection. Called DrBernadette     0.1mL of Red 1:1  given SQ in Upper Left arm 1 of 3.  Tolerated well. +2 Wheal reaction noted.    0.1mL of Red 1:1 given SQ in Left lower arm.  Tolerated well. +1 wheal reaction noted.    0.1mL of Red 1:1 given SQ in Upper Right arm 3 of 3.  Tolerated well.  0 reaction noted.    No complaints voiced.

## 2018-07-30 ENCOUNTER — PATIENT MESSAGE (OUTPATIENT)
Dept: FAMILY MEDICINE | Facility: CLINIC | Age: 48
End: 2018-07-30

## 2018-07-30 RX ORDER — METHYLPREDNISOLONE 4 MG/1
TABLET ORAL
Qty: 1 PACKAGE | Refills: 0 | Status: SHIPPED | OUTPATIENT
Start: 2018-07-30 | End: 2018-11-27

## 2018-07-30 NOTE — TELEPHONE ENCOUNTER
Nathanael Kaufman,    I am sorry that your still feeling poorly.  I am going to try a course of a steroid pack as this sometimes will help people with bad back pains if the traditional anti-inflammatories have not been as effective as they should.  It is called a Medrol Lio and you will take this for 6 days as directed on the package.  Let me know if the symptoms do not improve even with this in which case we might need to re-evaluate you can figure out what is the next step    Rhys Parker MD

## 2018-08-01 ENCOUNTER — CLINICAL SUPPORT (OUTPATIENT)
Dept: INTERNAL MEDICINE | Facility: CLINIC | Age: 48
End: 2018-08-01
Payer: COMMERCIAL

## 2018-08-01 DIAGNOSIS — J30.89 ALLERGIC RHINITIS DUE TO HOUSE DUST MITE: ICD-10-CM

## 2018-08-01 DIAGNOSIS — J30.1 CHRONIC ALLERGIC RHINITIS DUE TO POLLEN: ICD-10-CM

## 2018-08-01 PROCEDURE — 95117 IMMUNOTHERAPY INJECTIONS: CPT | Mod: S$GLB,,, | Performed by: FAMILY MEDICINE

## 2018-08-01 PROCEDURE — 99499 UNLISTED E&M SERVICE: CPT | Mod: S$GLB,,, | Performed by: ALLERGY & IMMUNOLOGY

## 2018-08-01 NOTE — PROGRESS NOTES
Pt states, no changes in meds, no problems with last injection. Called DrBernadette     0.1mL of Red 1:1  given SQ in Upper Left arm 1 of 3.  Tolerated well. +1 Wheal reaction noted.    0.1mL of Red 1:1 given SQ in Left lower arm.  Tolerated well. +1 wheal reaction noted.    0.1mL of Red 1:1 given SQ in Upper Right arm 3 of 3.  Tolerated well.  0 reaction noted.    No complaints voiced.

## 2018-08-29 ENCOUNTER — CLINICAL SUPPORT (OUTPATIENT)
Dept: INTERNAL MEDICINE | Facility: CLINIC | Age: 48
End: 2018-08-29
Payer: COMMERCIAL

## 2018-08-29 DIAGNOSIS — J30.9 CHRONIC ALLERGIC RHINITIS: ICD-10-CM

## 2018-08-29 PROCEDURE — 99499 UNLISTED E&M SERVICE: CPT | Mod: S$GLB,,, | Performed by: ALLERGY & IMMUNOLOGY

## 2018-08-29 PROCEDURE — 95117 IMMUNOTHERAPY INJECTIONS: CPT | Mod: S$GLB,,, | Performed by: FAMILY MEDICINE

## 2018-08-29 NOTE — PROGRESS NOTES
Pt states, no changes in meds, no problems with last injection.     0.1mL of Red 1:1  given SQ in Upper Left arm 1 of 3.  Tolerated well. 0 reaction noted    0.1mL of Red 1:1 given SQ in Left lower arm.  Tolerated well. +1 wheal reaction noted.    0.1mL of Red 1:1 given SQ in Upper Right arm 3 of 3.  Tolerated well.  0 reaction noted.    No complaints voiced.

## 2018-09-05 ENCOUNTER — CLINICAL SUPPORT (OUTPATIENT)
Dept: INTERNAL MEDICINE | Facility: CLINIC | Age: 48
End: 2018-09-05
Payer: COMMERCIAL

## 2018-09-05 DIAGNOSIS — J30.9 CHRONIC ALLERGIC RHINITIS: ICD-10-CM

## 2018-09-05 PROCEDURE — 99499 UNLISTED E&M SERVICE: CPT | Mod: S$GLB,,, | Performed by: ALLERGY & IMMUNOLOGY

## 2018-09-05 PROCEDURE — 95117 IMMUNOTHERAPY INJECTIONS: CPT | Mod: S$GLB,,, | Performed by: FAMILY MEDICINE

## 2018-09-05 NOTE — PROGRESS NOTES
Pt states, no changes in meds, no problems with last injection.     0.15mL of Red 1:1  given SQ in Upper Left arm 1 of 3.  Tolerated well. 1+ reaction noted    0.15mL of Red 1:1 given SQ in Left lower arm.  Tolerated well. +1 wheal reaction noted.    0.15mL of Red 1:1 given SQ in Upper Right arm 3 of 3.  Tolerated well.  0 reaction noted.    No complaints voiced.

## 2018-09-10 ENCOUNTER — CLINICAL SUPPORT (OUTPATIENT)
Dept: INTERNAL MEDICINE | Facility: CLINIC | Age: 48
End: 2018-09-10
Payer: COMMERCIAL

## 2018-09-10 DIAGNOSIS — J30.9 CHRONIC ALLERGIC RHINITIS: ICD-10-CM

## 2018-09-10 PROCEDURE — 99499 UNLISTED E&M SERVICE: CPT | Mod: S$GLB,,, | Performed by: ALLERGY & IMMUNOLOGY

## 2018-09-10 PROCEDURE — 95117 IMMUNOTHERAPY INJECTIONS: CPT | Mod: S$GLB,,, | Performed by: FAMILY MEDICINE

## 2018-09-10 NOTE — PROGRESS NOTES
Pt states, no changes in meds, no problems with last injection.     0.2mL of Red 1:1  given SQ in Upper Left arm 1 of 3.  Tolerated well. 1+ reaction noted    0.2mL of Red 1:1 given SQ in Left lower arm.  Tolerated well. +1 wheal reaction noted.    0.2mL of Red 1:1 given SQ in Upper Right arm 3 of 3.  Tolerated well.  0 reaction noted.    No complaints voiced.

## 2018-09-12 ENCOUNTER — CLINICAL SUPPORT (OUTPATIENT)
Dept: INTERNAL MEDICINE | Facility: CLINIC | Age: 48
End: 2018-09-12
Payer: COMMERCIAL

## 2018-09-12 ENCOUNTER — CLINICAL SUPPORT (OUTPATIENT)
Dept: ALLERGY | Facility: CLINIC | Age: 48
End: 2018-09-12
Payer: COMMERCIAL

## 2018-09-12 DIAGNOSIS — J30.9 CHRONIC ALLERGIC RHINITIS: ICD-10-CM

## 2018-09-12 PROCEDURE — 95165 ANTIGEN THERAPY SERVICES: CPT | Mod: S$GLB,,, | Performed by: ALLERGY & IMMUNOLOGY

## 2018-09-12 PROCEDURE — 99499 UNLISTED E&M SERVICE: CPT | Mod: S$GLB,,, | Performed by: ALLERGY & IMMUNOLOGY

## 2018-09-12 PROCEDURE — 99999 PR PBB SHADOW E&M-EST. PATIENT-LVL I: CPT | Mod: PBBFAC,,,

## 2018-09-12 PROCEDURE — 95117 IMMUNOTHERAPY INJECTIONS: CPT | Mod: S$GLB,,, | Performed by: FAMILY MEDICINE

## 2018-09-12 NOTE — PROGRESS NOTES
Pt states, no changes in meds, no problems with last injection.     0.25mL of Red 1:1  given SQ in Upper Left arm 1 of 3.  Tolerated well. 1+ reaction noted    0.25mL of Red 1:1 given SQ in Left lower arm.  Tolerated well. +1 wheal reaction noted.    0.25mL of Red 1:1 given SQ in Upper Right arm 3 of 3.  Tolerated well.  0 reaction noted.    No complaints voiced.

## 2018-09-19 ENCOUNTER — CLINICAL SUPPORT (OUTPATIENT)
Dept: INTERNAL MEDICINE | Facility: CLINIC | Age: 48
End: 2018-09-19
Payer: COMMERCIAL

## 2018-09-19 DIAGNOSIS — J30.1 NON-SEASONAL ALLERGIC RHINITIS DUE TO POLLEN: ICD-10-CM

## 2018-09-19 PROCEDURE — 99499 UNLISTED E&M SERVICE: CPT | Mod: S$GLB,,, | Performed by: ALLERGY & IMMUNOLOGY

## 2018-09-19 PROCEDURE — 95117 IMMUNOTHERAPY INJECTIONS: CPT | Mod: S$GLB,,, | Performed by: FAMILY MEDICINE

## 2018-09-19 NOTE — PROGRESS NOTES
Pt states, no changes in meds, no problems with last injection.   Patient started 3 new vaccines, backed up due to new vaccines.    0.1mL of Red 1:1  given SQ in Upper Left arm 1 of 3.  Tolerated well. 1+ reaction noted    0.1mL of Red 1:1 given SQ in Left lower arm.  Tolerated well. +1 wheal reaction noted.    0.1mL of Red 1:1 given SQ in Upper Right arm 3 of 3.  Tolerated well.  1+ reaction noted.    No complaints voiced.

## 2018-09-26 ENCOUNTER — CLINICAL SUPPORT (OUTPATIENT)
Dept: INTERNAL MEDICINE | Facility: CLINIC | Age: 48
End: 2018-09-26
Payer: COMMERCIAL

## 2018-09-26 DIAGNOSIS — J30.9 CHRONIC ALLERGIC RHINITIS: ICD-10-CM

## 2018-09-26 PROCEDURE — 95115 IMMUNOTHERAPY ONE INJECTION: CPT | Mod: S$GLB,,, | Performed by: FAMILY MEDICINE

## 2018-09-26 PROCEDURE — 99499 UNLISTED E&M SERVICE: CPT | Mod: S$GLB,,, | Performed by: ALLERGY & IMMUNOLOGY

## 2018-09-26 NOTE — PROGRESS NOTES
Pt states, no changes in meds, no problems with last injection.       0.15mL of Red 1:1  given SQ in Upper Left arm 1 of 3.  Tolerated well. 1+ reaction noted    0.15mL of Red 1:1 given SQ in Left lower arm.  Tolerated well. +1 wheal reaction noted.    0.15mL of Red 1:1 given SQ in Upper Right arm 3 of 3.  Tolerated well.  1+ reaction noted.    No complaints voiced.

## 2018-10-03 ENCOUNTER — CLINICAL SUPPORT (OUTPATIENT)
Dept: INTERNAL MEDICINE | Facility: CLINIC | Age: 48
End: 2018-10-03
Payer: COMMERCIAL

## 2018-10-03 DIAGNOSIS — J30.1 NON-SEASONAL ALLERGIC RHINITIS DUE TO POLLEN: ICD-10-CM

## 2018-10-03 PROCEDURE — 99499 UNLISTED E&M SERVICE: CPT | Mod: S$GLB,,, | Performed by: ALLERGY & IMMUNOLOGY

## 2018-10-03 PROCEDURE — 95117 IMMUNOTHERAPY INJECTIONS: CPT | Mod: S$GLB,,, | Performed by: FAMILY MEDICINE

## 2018-10-03 NOTE — PROGRESS NOTES
Pt states, no changes in meds, no problems with last injection.       0.2mL of Red 1:1  given SQ in Upper Left arm 1 of 3.  Tolerated well. 1+ reaction noted    0.2mL of Red 1:1 given SQ in Left lower arm. 2 of 3.  Tolerated well. No reaction noted.    0.2mL of Red 1:1 given SQ in Upper Right arm 3 of 3.  Tolerated well.  No reaction noted.    No complaints voiced.

## 2018-10-17 ENCOUNTER — CLINICAL SUPPORT (OUTPATIENT)
Dept: INTERNAL MEDICINE | Facility: CLINIC | Age: 48
End: 2018-10-17
Payer: COMMERCIAL

## 2018-10-17 DIAGNOSIS — J30.1 NON-SEASONAL ALLERGIC RHINITIS DUE TO POLLEN: ICD-10-CM

## 2018-10-17 PROCEDURE — 95117 IMMUNOTHERAPY INJECTIONS: CPT | Mod: S$GLB,,, | Performed by: FAMILY MEDICINE

## 2018-10-17 PROCEDURE — 99499 UNLISTED E&M SERVICE: CPT | Mod: S$GLB,,, | Performed by: ALLERGY & IMMUNOLOGY

## 2018-10-17 NOTE — PROGRESS NOTES
Pt states, no changes in meds, no problems with last injection.       0.2mL of Red 1:1  given SQ in Upper Left arm 1 of 3.  Tolerated well. 2+ wheal  noted    0.25mL of Red 1:1 given SQ in Left lower arm. 2 of 3.  Tolerated well. No reaction noted.    0.25mL of Red 1:1 given SQ in Upper Right arm 3 of 3.  Tolerated well.  1+ wheal and erythema noted   No complaints voiced.

## 2018-10-23 ENCOUNTER — DOCUMENTATION ONLY (OUTPATIENT)
Dept: OPTOMETRY | Facility: CLINIC | Age: 48
End: 2018-10-23

## 2018-10-23 NOTE — PROGRESS NOTES
Contact lenses dispensed for trial    Air Optix Multifocal    R1: -0.50/Med    L1: -0.25/Med      R2: -0.50/Hi    L2: -0.25/Hi      -------------------Addendum 11/23/18----------------------------  Shae reports (via [facility] patient portal) that option 2 provided good distance vision, but she's still having trouble seeing at near (small print)    Trials to dispense  Biofinity Multifocal  OD -0.50/+2.50 D  OS -0.25/+2.50 D    [facility] in 2 weeks    -------------------Addendum 3/27/19----------------------------  Jennifer reports that both distance and near become fuzzy after a few hours of wear.    Dispense trials:  Pair #1   Biofinity Energys 8.6/14.0  OD -0.50  OS -0.25    Pair #2  Dailies Total Multifocal  8.5/14.1  OD -0.50/Lo  OS -0.50/Lo    Pair #3  Dailies Total Multifocal  8.5/14.1  OD -0.50/Med  OS -0.25/Hi    [facility] in 2 weeks

## 2018-10-31 ENCOUNTER — CLINICAL SUPPORT (OUTPATIENT)
Dept: INTERNAL MEDICINE | Facility: CLINIC | Age: 48
End: 2018-10-31
Payer: COMMERCIAL

## 2018-10-31 DIAGNOSIS — J30.1 NON-SEASONAL ALLERGIC RHINITIS DUE TO POLLEN: ICD-10-CM

## 2018-10-31 PROCEDURE — 99499 UNLISTED E&M SERVICE: CPT | Mod: S$GLB,,, | Performed by: ALLERGY & IMMUNOLOGY

## 2018-10-31 PROCEDURE — 95117 IMMUNOTHERAPY INJECTIONS: CPT | Mod: S$GLB,,, | Performed by: FAMILY MEDICINE

## 2018-11-21 ENCOUNTER — CLINICAL SUPPORT (OUTPATIENT)
Dept: INTERNAL MEDICINE | Facility: CLINIC | Age: 48
End: 2018-11-21
Payer: COMMERCIAL

## 2018-11-21 DIAGNOSIS — J30.9 ALLERGIC RHINITIS, UNSPECIFIED SEASONALITY, UNSPECIFIED TRIGGER: ICD-10-CM

## 2018-11-21 DIAGNOSIS — J30.1 NON-SEASONAL ALLERGIC RHINITIS DUE TO POLLEN: ICD-10-CM

## 2018-11-21 PROCEDURE — 99499 UNLISTED E&M SERVICE: CPT | Mod: S$GLB,,, | Performed by: ALLERGY & IMMUNOLOGY

## 2018-11-21 PROCEDURE — 95117 IMMUNOTHERAPY INJECTIONS: CPT | Mod: S$GLB,,, | Performed by: FAMILY MEDICINE

## 2018-11-21 NOTE — PROGRESS NOTES
Pt states, no changes in meds, no problems with last injection.       0.3mL of Red 1:1  given SQ in Upper Left arm 1 of 3.  Repeated same dose due to time in between injections.Tolerated well. 2+ wheal  noted    0.3mL of Red 1:1 given SQ in Left lower arm. 2 of 3.   Repeated same dose due to time in between injections. Tolerated well. No reaction noted.    0.3mL of Red 1:1 given SQ in Upper Right arm 3 of 3. Repeated same dose due to time in between injections. Tolerated well.  1+ wheal and erythema noted   No complaints voiced.

## 2018-11-23 ENCOUNTER — PATIENT MESSAGE (OUTPATIENT)
Dept: OPTOMETRY | Facility: CLINIC | Age: 48
End: 2018-11-23

## 2018-11-27 ENCOUNTER — OFFICE VISIT (OUTPATIENT)
Dept: DERMATOLOGY | Facility: CLINIC | Age: 48
End: 2018-11-27
Payer: COMMERCIAL

## 2018-11-27 DIAGNOSIS — L82.1 SEBORRHEIC KERATOSIS: ICD-10-CM

## 2018-11-27 DIAGNOSIS — D23.61 DERMATOFIBROMA OF RIGHT UPPER ARM: ICD-10-CM

## 2018-11-27 DIAGNOSIS — D23.62 DERMATOFIBROMA OF LEFT UPPER ARM: ICD-10-CM

## 2018-11-27 DIAGNOSIS — D22.5 MELANOCYTIC NEVI OF TRUNK: ICD-10-CM

## 2018-11-27 DIAGNOSIS — D48.5 NEOPLASM OF UNCERTAIN BEHAVIOR OF SKIN: Primary | ICD-10-CM

## 2018-11-27 PROCEDURE — 99203 OFFICE O/P NEW LOW 30 MIN: CPT | Mod: 25,S$GLB,, | Performed by: DERMATOLOGY

## 2018-11-27 PROCEDURE — 88305 TISSUE EXAM BY PATHOLOGIST: CPT | Mod: 26,,, | Performed by: PATHOLOGY

## 2018-11-27 PROCEDURE — 88305 TISSUE EXAM BY PATHOLOGIST: CPT

## 2018-11-27 PROCEDURE — 11100 PR BIOPSY OF SKIN LESION: CPT | Mod: S$GLB,,, | Performed by: DERMATOLOGY

## 2018-11-27 NOTE — PROGRESS NOTES
Subjective:       Patient ID:  Shae Trotter is a 48 y.o. female who presents for   Chief Complaint   Patient presents with    Skin Check     UBSE    Mole     left breast, 5 years changes in shape, funny color     Pt is here today for UBSE with a c/o of mole to her left breast that has been present for about 10 years but has changed in size over the past 5. Pt states that mole appears to be bigger and a different color.       Mole  - Initial  Affected locations: chest  Duration: 10 years  Signs / symptoms: growing (changes in color and shape)  Severity: mild  Timing: constant  Aggravated by: nothing  Relieving factors/Treatments tried: nothing  Improvement on treatment: no relief    Mass  - Initial  Affected locations: left arm and right arm  Duration: several years.  Signs / symptoms: asymptomatic (no change in size/shape/color)  Severity: mild  Timing: constant  Aggravated by: nothing  Relieving factors/Treatments tried: nothing      Review of Systems   Skin: Positive for activity-related sunscreen use and wears hat. Negative for daily sunscreen use.   Hematologic/Lymphatic: Bruises/bleeds easily.        Bruise        Objective:    Physical Exam   Constitutional: She appears well-developed and well-nourished. No distress.   HENT:   Mouth/Throat: Lips normal.    Eyes: Lids are normal.  No conjunctival no injection.   Neurological: She is alert and oriented to person, place, and time. She is not disoriented.   Psychiatric: She has a normal mood and affect.   Skin:   Areas Examined (abnormalities noted in diagram):   Head / Face Inspection Performed  Neck Inspection Performed  Chest / Axilla Inspection Performed  Abdomen Inspection Performed  Back Inspection Performed  RUE Inspected  LUE Inspection Performed  Nails and Digits Inspection Performed              Diagram Legend     Erythematous scaling macule/papule c/w actinic keratosis       Vascular papule c/w angioma      Pigmented verrucoid papule/plaque  c/w seborrheic keratosis      Yellow umbilicated papule c/w sebaceous hyperplasia      Irregularly shaped tan macule c/w lentigo     1-2 mm smooth white papules consistent with Milia      Movable subcutaneous cyst with punctum c/w epidermal inclusion cyst      Subcutaneous movable cyst c/w pilar cyst      Firm pink to brown papule c/w dermatofibroma      Pedunculated fleshy papule(s) c/w skin tag(s)      Evenly pigmented macule c/w junctional nevus     Mildly variegated pigmented, slightly irregular-bordered macule c/w mildly atypical nevus      Flesh colored to evenly pigmented papule c/w intradermal nevus       Pink pearly papule/plaque c/w basal cell carcinoma      Erythematous hyperkeratotic cursted plaque c/w SCC      Surgical scar with no sign of skin cancer recurrence      Open and closed comedones      Inflammatory papules and pustules      Verrucoid papule consistent consistent with wart     Erythematous eczematous patches and plaques     Dystrophic onycholytic nail with subungual debris c/w onychomycosis     Umbilicated papule    Erythematous-base heme-crusted tan verrucoid plaque consistent with inflamed seborrheic keratosis     Erythematous Silvery Scaling Plaque c/w Psoriasis     See annotation      Assessment / Plan:      Pathology Orders:     Normal Orders This Visit    Tissue Specimen To Pathology, Dermatology     Questions:    Directional Terms:  Other(comment)    Clinical information:  r/o SK vs dysplastic nevus    Specific Site:  left breast        Neoplasm of uncertain behavior of skin  -     Tissue Specimen To Pathology, Dermatology  Shave biopsy procedure note:  Risk, benefits, and alternatives of biopsy are discussed with the patient, including risk of infection, scar, recurrence, and need for additional treatment of site. The patient agrees to the procedure by verbal consent. The area is marked and prepped with alcohol.  Approximately 1 mL of lidocaine 1% with epinephrine is used for local  anesthesia. A sharp blade is used to remove part or all of the lesion. The specimen is sent for pathology. Hemostasis is obtained with aluminum chloride and/or monopolar hyfrecation if needed. The area is then dressed and bandaged. The patient tolerated the procedure well without adverse event. Written instructions on wound care were given and were reviewed with the patient, who is to call for any signs of bleeding or infection. The patient will be notified of the pathology results.    Dermatofibroma of right upper arm  Dermatofibroma of left upper arm  Reassurance given to patient. No treatment is necessary.  This is a benign growth that sometimes occurs as a result of trauma or insect bites.    Seborrheic keratosis  These are benign, inherited growths without a malignant potential. Reassurance given to patient. No treatment is necessary. Handout was provided.    Melanocytic nevi of trunk  Few benign-appearing nevi present on exam today. Reassurance provided. Counseled patient to periodically examine moles and return to clinic if any moles change or become symptomatic.    Follow-up in about 1 year (around 11/27/2019) for TBSE, or sooner dependent on biopsy results.

## 2018-11-27 NOTE — LETTER
November 27, 2018      [facility]  [address] [facility] [address]           [facility] - Dermatology  [address] [place] [id number]  Phone: [phone]  Fax: [phone]          Patient: Shae Trotter   MR Number: [id number]   YOB: 1970   Date of Visit: 11/27/2018       Dear [facility]:    Thank you for referring Shae Trotter to me for evaluation. Attached you will find relevant portions of my assessment and plan of care.    If you have questions, please do not hesitate to call me. I look forward to following Shae Trotter along with you.    Sincerely,    Margarita Barone MD    Enclosure  CC:  No Recipients    If you would like to receive this communication electronically, please contact [email or web address] or [phone] to request more information on [facility] access.    For providers and/or their staff who would like to refer a patient to [facility], please contact us through our one-stop-shop provider referral line, [facility], at [phone].    If you feel you have received this communication in error or would no longer like to receive these types of communications, please e-mail [email or web address]

## 2018-12-05 ENCOUNTER — CLINICAL SUPPORT (OUTPATIENT)
Dept: INTERNAL MEDICINE | Facility: CLINIC | Age: 48
End: 2018-12-05
Payer: COMMERCIAL

## 2018-12-05 DIAGNOSIS — J30.1 NON-SEASONAL ALLERGIC RHINITIS DUE TO POLLEN: ICD-10-CM

## 2018-12-05 PROCEDURE — 99499 UNLISTED E&M SERVICE: CPT | Mod: S$GLB,,, | Performed by: ALLERGY & IMMUNOLOGY

## 2018-12-05 PROCEDURE — 95117 IMMUNOTHERAPY INJECTIONS: CPT | Mod: S$GLB,,, | Performed by: FAMILY MEDICINE

## 2018-12-12 ENCOUNTER — CLINICAL SUPPORT (OUTPATIENT)
Dept: INTERNAL MEDICINE | Facility: CLINIC | Age: 48
End: 2018-12-12
Payer: COMMERCIAL

## 2018-12-12 DIAGNOSIS — J30.9 ALLERGIC RHINITIS, UNSPECIFIED SEASONALITY, UNSPECIFIED TRIGGER: ICD-10-CM

## 2018-12-12 DIAGNOSIS — J30.1 NON-SEASONAL ALLERGIC RHINITIS DUE TO POLLEN: ICD-10-CM

## 2018-12-12 PROCEDURE — 95117 IMMUNOTHERAPY INJECTIONS: CPT | Mod: S$GLB,,, | Performed by: FAMILY MEDICINE

## 2018-12-12 PROCEDURE — 99499 UNLISTED E&M SERVICE: CPT | Mod: S$GLB,,, | Performed by: ALLERGY & IMMUNOLOGY

## 2018-12-12 NOTE — PROGRESS NOTES
Pt states, no changes in meds, no problems with last injection.       0.4mL of Red 1:1  given SQ in Upper Left arm 1 of 3.  Repeated same dose due to time in between injections.Tolerated well. 2+ wheal  noted    0.4mL of Red 1:1 given SQ in Left lower arm. 2 of 3.   Repeated same dose due to time in between injections. Tolerated well. No reaction noted.    0.4mL of Red 1:1 given SQ in Upper Right arm 3 of 3. Repeated same dose due to time in between injections. Tolerated well.  1+ wheal and erythema noted   No complaints voiced.

## 2018-12-26 ENCOUNTER — TELEPHONE (OUTPATIENT)
Dept: ALLERGY | Facility: CLINIC | Age: 48
End: 2018-12-26

## 2018-12-26 NOTE — TELEPHONE ENCOUNTER
Called patient to cancel appt on 12/26/18 at [facility].  Pt was still on schedule and should have been advised that there won't be any injections given that day due to no provider in house.    No answer, left msg on voicemail that I was cancelling appt on 12/26/18 and can reschedule to tomorrow if she'd like.

## 2019-01-02 ENCOUNTER — CLINICAL SUPPORT (OUTPATIENT)
Dept: INTERNAL MEDICINE | Facility: CLINIC | Age: 49
End: 2019-01-02
Payer: COMMERCIAL

## 2019-01-02 DIAGNOSIS — J30.1 NON-SEASONAL ALLERGIC RHINITIS DUE TO POLLEN: ICD-10-CM

## 2019-01-02 PROCEDURE — 95117 PR IMMU2THERAPY, 2+ INJECTIONS: ICD-10-PCS | Mod: S$GLB,,, | Performed by: FAMILY MEDICINE

## 2019-01-02 PROCEDURE — 99499 UNLISTED E&M SERVICE: CPT | Mod: S$GLB,,, | Performed by: ALLERGY & IMMUNOLOGY

## 2019-01-02 PROCEDURE — 95117 IMMUNOTHERAPY INJECTIONS: CPT | Mod: S$GLB,,, | Performed by: FAMILY MEDICINE

## 2019-01-02 PROCEDURE — 99499 NO LOS: ICD-10-PCS | Mod: S$GLB,,, | Performed by: ALLERGY & IMMUNOLOGY

## 2019-01-23 ENCOUNTER — CLINICAL SUPPORT (OUTPATIENT)
Dept: INTERNAL MEDICINE | Facility: CLINIC | Age: 49
End: 2019-01-23
Payer: COMMERCIAL

## 2019-01-23 DIAGNOSIS — J30.2 SEASONAL ALLERGIC RHINITIS, UNSPECIFIED TRIGGER: ICD-10-CM

## 2019-01-23 PROCEDURE — 99499 NO LOS: ICD-10-PCS | Mod: S$GLB,,, | Performed by: ALLERGY & IMMUNOLOGY

## 2019-01-23 PROCEDURE — 95117 PR IMMU2THERAPY, 2+ INJECTIONS: ICD-10-PCS | Mod: S$GLB,,, | Performed by: FAMILY MEDICINE

## 2019-01-23 PROCEDURE — 99499 UNLISTED E&M SERVICE: CPT | Mod: S$GLB,,, | Performed by: ALLERGY & IMMUNOLOGY

## 2019-01-23 PROCEDURE — 95117 IMMUNOTHERAPY INJECTIONS: CPT | Mod: S$GLB,,, | Performed by: FAMILY MEDICINE

## 2019-02-15 ENCOUNTER — CLINICAL SUPPORT (OUTPATIENT)
Dept: INTERNAL MEDICINE | Facility: CLINIC | Age: 49
End: 2019-02-15
Payer: COMMERCIAL

## 2019-02-15 DIAGNOSIS — J30.2 SEASONAL ALLERGIC RHINITIS, UNSPECIFIED TRIGGER: ICD-10-CM

## 2019-02-15 PROCEDURE — 95117 PR IMMU2THERAPY, 2+ INJECTIONS: ICD-10-PCS | Mod: S$GLB,,, | Performed by: FAMILY MEDICINE

## 2019-02-15 PROCEDURE — 95117 IMMUNOTHERAPY INJECTIONS: CPT | Mod: S$GLB,,, | Performed by: FAMILY MEDICINE

## 2019-02-15 PROCEDURE — 99499 NO LOS: ICD-10-PCS | Mod: S$GLB,,, | Performed by: ALLERGY & IMMUNOLOGY

## 2019-02-15 PROCEDURE — 99499 UNLISTED E&M SERVICE: CPT | Mod: S$GLB,,, | Performed by: ALLERGY & IMMUNOLOGY

## 2019-03-06 ENCOUNTER — CLINICAL SUPPORT (OUTPATIENT)
Dept: INTERNAL MEDICINE | Facility: CLINIC | Age: 49
End: 2019-03-06
Payer: COMMERCIAL

## 2019-03-06 DIAGNOSIS — J30.9 CHRONIC ALLERGIC RHINITIS: ICD-10-CM

## 2019-03-06 PROCEDURE — 99499 UNLISTED E&M SERVICE: CPT | Mod: S$GLB,,, | Performed by: ALLERGY & IMMUNOLOGY

## 2019-03-06 PROCEDURE — 95117 PR IMMU2THERAPY, 2+ INJECTIONS: ICD-10-PCS | Mod: S$GLB,,, | Performed by: FAMILY MEDICINE

## 2019-03-06 PROCEDURE — 99499 NO LOS: ICD-10-PCS | Mod: S$GLB,,, | Performed by: ALLERGY & IMMUNOLOGY

## 2019-03-06 PROCEDURE — 95117 IMMUNOTHERAPY INJECTIONS: CPT | Mod: S$GLB,,, | Performed by: FAMILY MEDICINE

## 2019-03-06 NOTE — PROGRESS NOTES
See Media Tab for Allergy Injection information.  Sites 1/3 and 2/3 had induration and some erythema, 2+, site 3/3 no reaction noted.  No complaints voiced.

## 2019-03-22 ENCOUNTER — PATIENT MESSAGE (OUTPATIENT)
Dept: OPTOMETRY | Facility: CLINIC | Age: 49
End: 2019-03-22

## 2019-04-01 ENCOUNTER — CLINICAL SUPPORT (OUTPATIENT)
Dept: INTERNAL MEDICINE | Facility: CLINIC | Age: 49
End: 2019-04-01
Payer: COMMERCIAL

## 2019-04-01 ENCOUNTER — TELEPHONE (OUTPATIENT)
Dept: OPTOMETRY | Facility: CLINIC | Age: 49
End: 2019-04-01

## 2019-04-01 DIAGNOSIS — J30.9 CHRONIC ALLERGIC RHINITIS: ICD-10-CM

## 2019-04-01 PROCEDURE — 95117 PR IMMU2THERAPY, 2+ INJECTIONS: ICD-10-PCS | Mod: S$GLB,,, | Performed by: FAMILY MEDICINE

## 2019-04-01 PROCEDURE — 95117 IMMUNOTHERAPY INJECTIONS: CPT | Mod: S$GLB,,, | Performed by: FAMILY MEDICINE

## 2019-04-01 PROCEDURE — 99499 NO LOS: ICD-10-PCS | Mod: S$GLB,,, | Performed by: ALLERGY & IMMUNOLOGY

## 2019-04-01 PROCEDURE — 99499 UNLISTED E&M SERVICE: CPT | Mod: S$GLB,,, | Performed by: ALLERGY & IMMUNOLOGY

## 2019-04-01 NOTE — TELEPHONE ENCOUNTER
Spoke to pt and informed her that trial lenses are ready and will be available to  at that check in desk.

## 2019-04-24 ENCOUNTER — CLINICAL SUPPORT (OUTPATIENT)
Dept: INTERNAL MEDICINE | Facility: CLINIC | Age: 49
End: 2019-04-24
Payer: COMMERCIAL

## 2019-04-24 DIAGNOSIS — J30.9 CHRONIC ALLERGIC RHINITIS: ICD-10-CM

## 2019-04-24 PROCEDURE — 99499 NO LOS: ICD-10-PCS | Mod: S$GLB,,, | Performed by: ALLERGY & IMMUNOLOGY

## 2019-04-24 PROCEDURE — 95117 IMMUNOTHERAPY INJECTIONS: CPT | Mod: S$GLB,,, | Performed by: FAMILY MEDICINE

## 2019-04-24 PROCEDURE — 95117 PR IMMU2THERAPY, 2+ INJECTIONS: ICD-10-PCS | Mod: S$GLB,,, | Performed by: FAMILY MEDICINE

## 2019-04-24 PROCEDURE — 99499 UNLISTED E&M SERVICE: CPT | Mod: S$GLB,,, | Performed by: ALLERGY & IMMUNOLOGY

## 2019-05-21 ENCOUNTER — PATIENT MESSAGE (OUTPATIENT)
Dept: DERMATOLOGY | Facility: CLINIC | Age: 49
End: 2019-05-21

## 2019-06-05 ENCOUNTER — CLINICAL SUPPORT (OUTPATIENT)
Dept: INTERNAL MEDICINE | Facility: CLINIC | Age: 49
End: 2019-06-05
Payer: COMMERCIAL

## 2019-06-05 DIAGNOSIS — J30.9 CHRONIC ALLERGIC RHINITIS: ICD-10-CM

## 2019-06-05 PROCEDURE — 95117 PR IMMU2THERAPY, 2+ INJECTIONS: ICD-10-PCS | Mod: S$GLB,,, | Performed by: FAMILY MEDICINE

## 2019-06-05 PROCEDURE — 99499 NO LOS: ICD-10-PCS | Mod: S$GLB,,, | Performed by: ALLERGY & IMMUNOLOGY

## 2019-06-05 PROCEDURE — 95117 IMMUNOTHERAPY INJECTIONS: CPT | Mod: S$GLB,,, | Performed by: FAMILY MEDICINE

## 2019-06-05 PROCEDURE — 99499 UNLISTED E&M SERVICE: CPT | Mod: S$GLB,,, | Performed by: ALLERGY & IMMUNOLOGY

## 2019-06-21 ENCOUNTER — PATIENT MESSAGE (OUTPATIENT)
Dept: OPTOMETRY | Facility: CLINIC | Age: 49
End: 2019-06-21

## 2019-06-21 DIAGNOSIS — H00.019 HORDEOLUM EXTERNUM, UNSPECIFIED LATERALITY: Primary | ICD-10-CM

## 2019-06-21 RX ORDER — MINOCYCLINE HYDROCHLORIDE 50 MG/1
100 CAPSULE ORAL 2 TIMES DAILY
Qty: 40 CAPSULE | Refills: 0 | Status: SHIPPED | OUTPATIENT
Start: 2019-06-21 | End: 2019-07-01

## 2019-06-25 ENCOUNTER — OFFICE VISIT (OUTPATIENT)
Dept: FAMILY MEDICINE | Facility: CLINIC | Age: 49
End: 2019-06-25
Attending: FAMILY MEDICINE
Payer: COMMERCIAL

## 2019-06-25 VITALS
SYSTOLIC BLOOD PRESSURE: 110 MMHG | BODY MASS INDEX: 21.61 KG/M2 | DIASTOLIC BLOOD PRESSURE: 70 MMHG | HEIGHT: 61 IN | WEIGHT: 114.44 LBS | HEART RATE: 81 BPM | OXYGEN SATURATION: 99 %

## 2019-06-25 DIAGNOSIS — J30.89 NON-SEASONAL ALLERGIC RHINITIS DUE TO OTHER ALLERGIC TRIGGER: ICD-10-CM

## 2019-06-25 DIAGNOSIS — H00.012 HORDEOLUM EXTERNUM OF RIGHT LOWER EYELID: Primary | ICD-10-CM

## 2019-06-25 PROCEDURE — 99999 PR PBB SHADOW E&M-EST. PATIENT-LVL III: CPT | Mod: PBBFAC,,, | Performed by: FAMILY MEDICINE

## 2019-06-25 PROCEDURE — 3008F PR BODY MASS INDEX (BMI) DOCUMENTED: ICD-10-PCS | Mod: CPTII,S$GLB,, | Performed by: FAMILY MEDICINE

## 2019-06-25 PROCEDURE — 99214 OFFICE O/P EST MOD 30 MIN: CPT | Mod: S$GLB,,, | Performed by: FAMILY MEDICINE

## 2019-06-25 PROCEDURE — 99999 PR PBB SHADOW E&M-EST. PATIENT-LVL III: ICD-10-PCS | Mod: PBBFAC,,, | Performed by: FAMILY MEDICINE

## 2019-06-25 PROCEDURE — 3008F BODY MASS INDEX DOCD: CPT | Mod: CPTII,S$GLB,, | Performed by: FAMILY MEDICINE

## 2019-06-25 PROCEDURE — 99214 PR OFFICE/OUTPT VISIT, EST, LEVL IV, 30-39 MIN: ICD-10-PCS | Mod: S$GLB,,, | Performed by: FAMILY MEDICINE

## 2019-06-25 RX ORDER — FLUTICASONE PROPIONATE 50 MCG
1 SPRAY, SUSPENSION (ML) NASAL DAILY
Qty: 16 G | Refills: 2 | Status: SHIPPED | OUTPATIENT
Start: 2019-06-25 | End: 2023-03-23

## 2019-06-25 RX ORDER — ERYTHROMYCIN 5 MG/G
OINTMENT OPHTHALMIC EVERY 6 HOURS
Qty: 3.5 G | Refills: 0 | Status: SHIPPED | OUTPATIENT
Start: 2019-06-25 | End: 2019-07-10

## 2019-06-25 NOTE — PROGRESS NOTES
Subjective:       Patient ID: Shae Trotter is a 48 y.o. female.    Chief Complaint: Dizziness; Sinusitis; Post Nasal Drip; and Ear Fullness (Both Ears)    48 YR OLD PLEASANT FEMALE WITH ALLERGIES AND NO OTHER SIGNIFICANT MEDICAL HISTORY PRESENTS TODAY AS NEW PATIENT TO ME AND FOR EVALUATION OF STYE RIGHT EYE AND ALSO ALLERGIES AND TINNITUS/DIZZINESS. SHE WAS SEEN AT URGENT CARE RECENTLY FOR STYE AND GIVEN MINOCYCLINE AND THE SIDE EFFECTS STARTED RIGHT AFTER. NO FEVER OR CHILLS. SHE HAS MILD COUGH WITH YELLOW SPUTUM AND POST NASAL DRIP AS WELL. NON SMOKER AND NO SICK CONTACTS.      HISTORY AS BELOW - REVIEWED     Eye Problem    The right eye is affected. This is a new problem. The current episode started in the past 7 days. The problem occurs intermittently. The problem has been gradually improving. The pain is at a severity of 3/10. The pain is mild. There is no known exposure to pink eye. She does not wear contacts. Associated symptoms include eye redness. Pertinent negatives include no blurred vision, eye discharge, fever, foreign body sensation, nausea, photophobia, recent URI or vomiting. Treatments tried: MINOCYCLINE. The treatment provided no relief.     Review of Systems   Constitutional: Negative.  Negative for activity change, diaphoresis, fever and unexpected weight change.   HENT: Positive for congestion and postnasal drip. Negative for ear discharge, hearing loss, rhinorrhea, sore throat and voice change.    Eyes: Positive for pain and redness. Negative for blurred vision, photophobia, discharge and visual disturbance.   Respiratory: Negative.  Negative for chest tightness, shortness of breath and wheezing.    Cardiovascular: Negative.  Negative for chest pain.   Gastrointestinal: Negative.  Negative for abdominal distention, anal bleeding, constipation, nausea and vomiting.   Endocrine: Negative.  Negative for cold intolerance, polydipsia and polyuria.   Genitourinary: Negative.  Negative for  decreased urine volume, difficulty urinating, dysuria, frequency, menstrual problem and vaginal pain.   Musculoskeletal: Negative.  Negative for arthralgias, gait problem and myalgias.   Skin: Negative.  Negative for color change, pallor and wound.   Allergic/Immunologic: Negative.  Negative for environmental allergies and immunocompromised state.   Neurological: Negative.  Negative for dizziness, tremors, seizures, speech difficulty and headaches.   Hematological: Negative.  Negative for adenopathy. Does not bruise/bleed easily.   Psychiatric/Behavioral: Negative.  Negative for agitation, confusion, decreased concentration, hallucinations, self-injury and suicidal ideas. The patient is not nervous/anxious.        PMH/PSH/FH/SH/MED/ALLERGY reviewed    Objective:       Vitals:    06/25/19 0931   BP: 110/70   Pulse: 81       Physical Exam   Constitutional: She is oriented to person, place, and time. She appears well-developed and well-nourished. No distress.   HENT:   Head: Normocephalic and atraumatic.   Nose: Mucosal edema present. Right sinus exhibits maxillary sinus tenderness. Left sinus exhibits maxillary sinus tenderness.   Eyes: Pupils are equal, round, and reactive to light. EOM are normal. Right eye exhibits chemosis.       Neck: Normal range of motion. Neck supple.   Cardiovascular: Normal rate, regular rhythm, normal heart sounds and intact distal pulses. Exam reveals no gallop and no friction rub.   No murmur heard.  Pulmonary/Chest: Effort normal and breath sounds normal. No respiratory distress. She has no wheezes. She has no rales.   Neurological: She is alert and oriented to person, place, and time.   Skin: Skin is warm and dry. She is not diaphoretic.   Psychiatric: She has a normal mood and affect.       Assessment:       1. Hordeolum externum of right lower eyelid    2. Non-seasonal allergic rhinitis due to other allergic trigger        Plan:           Shae was seen today for dizziness,  sinusitis, post nasal drip and ear fullness.    Diagnoses and all orders for this visit:    Hordeolum externum of right lower eyelid  -     erythromycin (ROMYCIN) ophthalmic ointment; Place into the right eye every 6 (six) hours as directed    Non-seasonal allergic rhinitis due to other allergic trigger  -     fluticasone propionate (FLONASE) 50 mcg/actuation nasal spray; 1 spray (50 mcg total) by Each Nare route once daily.      STYE EIGHT  -COOL COMPRESSES  -ERYTHROMYCIN OINTMENT LOCAL APPLICATION    AR  -FLONASE  -Advised saline irrigation, warm humidifier, steam inhalation, vicks vaporub, claritin D for congestion  ..  Spent adequate time in obtaining history and explaining differentials      40 minutes spent during this visit of which greater than 50% devoted to face-face counseling and coordination of care regarding diagnosis and management plan    Follow up if symptoms worsen or fail to improve.

## 2019-06-26 ENCOUNTER — CLINICAL SUPPORT (OUTPATIENT)
Dept: INTERNAL MEDICINE | Facility: CLINIC | Age: 49
End: 2019-06-26
Payer: COMMERCIAL

## 2019-06-26 DIAGNOSIS — J30.9 CHRONIC ALLERGIC RHINITIS: ICD-10-CM

## 2019-06-26 PROCEDURE — 99499 NO LOS: ICD-10-PCS | Mod: S$GLB,,, | Performed by: ALLERGY & IMMUNOLOGY

## 2019-06-26 PROCEDURE — 99499 UNLISTED E&M SERVICE: CPT | Mod: S$GLB,,, | Performed by: ALLERGY & IMMUNOLOGY

## 2019-06-26 PROCEDURE — 95117 PR IMMU2THERAPY, 2+ INJECTIONS: ICD-10-PCS | Mod: S$GLB,,, | Performed by: FAMILY MEDICINE

## 2019-06-26 PROCEDURE — 95117 IMMUNOTHERAPY INJECTIONS: CPT | Mod: S$GLB,,, | Performed by: FAMILY MEDICINE

## 2019-07-09 ENCOUNTER — PATIENT MESSAGE (OUTPATIENT)
Dept: OPTOMETRY | Facility: CLINIC | Age: 49
End: 2019-07-09

## 2019-07-10 DIAGNOSIS — H11.229: Primary | ICD-10-CM

## 2019-07-10 RX ORDER — PREDNISOLONE ACETATE 10 MG/ML
1 SUSPENSION/ DROPS OPHTHALMIC 4 TIMES DAILY
Qty: 5 ML | Refills: 1 | Status: SHIPPED | OUTPATIENT
Start: 2019-07-10 | End: 2019-08-01 | Stop reason: DRUGHIGH

## 2019-07-17 ENCOUNTER — CLINICAL SUPPORT (OUTPATIENT)
Dept: INTERNAL MEDICINE | Facility: CLINIC | Age: 49
End: 2019-07-17
Payer: COMMERCIAL

## 2019-07-17 DIAGNOSIS — J30.9 CHRONIC ALLERGIC RHINITIS: ICD-10-CM

## 2019-07-17 PROCEDURE — 95117 PR IMMU2THERAPY, 2+ INJECTIONS: ICD-10-PCS | Mod: S$GLB,,, | Performed by: FAMILY MEDICINE

## 2019-07-17 PROCEDURE — 99499 NO LOS: ICD-10-PCS | Mod: S$GLB,,, | Performed by: ALLERGY & IMMUNOLOGY

## 2019-07-17 PROCEDURE — 95117 IMMUNOTHERAPY INJECTIONS: CPT | Mod: S$GLB,,, | Performed by: FAMILY MEDICINE

## 2019-07-17 PROCEDURE — 99499 UNLISTED E&M SERVICE: CPT | Mod: S$GLB,,, | Performed by: ALLERGY & IMMUNOLOGY

## 2019-08-01 ENCOUNTER — OFFICE VISIT (OUTPATIENT)
Dept: OPTOMETRY | Facility: CLINIC | Age: 49
End: 2019-08-01
Payer: COMMERCIAL

## 2019-08-01 DIAGNOSIS — H11.229: Primary | ICD-10-CM

## 2019-08-01 DIAGNOSIS — H00.12 CHALAZION OF RIGHT LOWER EYELID: ICD-10-CM

## 2019-08-01 PROCEDURE — 92014 PR EYE EXAM, EST PATIENT,COMPREHESV: ICD-10-PCS | Mod: S$GLB,,, | Performed by: OPTOMETRIST

## 2019-08-01 PROCEDURE — 99999 PR PBB SHADOW E&M-EST. PATIENT-LVL II: CPT | Mod: PBBFAC,,, | Performed by: OPTOMETRIST

## 2019-08-01 PROCEDURE — 92014 COMPRE OPH EXAM EST PT 1/>: CPT | Mod: S$GLB,,, | Performed by: OPTOMETRIST

## 2019-08-01 PROCEDURE — 99999 PR PBB SHADOW E&M-EST. PATIENT-LVL II: ICD-10-PCS | Mod: PBBFAC,,, | Performed by: OPTOMETRIST

## 2019-08-01 RX ORDER — AZITHROMYCIN 500 MG/1
500 TABLET, FILM COATED ORAL DAILY
Qty: 3 TABLET | Refills: 0 | Status: SHIPPED | OUTPATIENT
Start: 2019-08-01 | End: 2020-06-15

## 2019-08-01 RX ORDER — PREDNISOLONE ACETATE 10 MG/ML
SUSPENSION/ DROPS OPHTHALMIC
Qty: 5 ML | Refills: 1 | Status: SHIPPED | OUTPATIENT
Start: 2019-08-01 | End: 2019-08-13

## 2019-08-01 NOTE — Clinical Note
Dispense Trials: Ultra for Presbyopia 8.5/14.2-0.50 / High-0.50/High-0.25/High-0.25/High-0.50/Med-0.50/Med-0.25/Med-0.25/Med

## 2019-08-01 NOTE — PROGRESS NOTES
EILEEN     Shae Trotter is a/an 48 y.o. female who comes in  for continued eye   care/ follow up for conjunctival granuloma of the right eye. Patient   states that she has noticed a decrease in swelling but she feels like the   granuloma is reoccurring. Patient is currently using Prednisolone 1% four   time a day in the right eye.    (--)blurred vision  (--)Headaches  (--)diplopia  (--)flashes  (--)floaters  (--)pain  (--)Itching  (--)tearing  (--)burning  (--)Dryness  (--) OTC Drops            Last edited by Nguyen Good on 8/1/2019 11:07 AM. (History)        ROS    For exam results, see encounter report    Assessment /Plan     1. Granuloma of conjunctiva, unspecified laterality  - prednisoLONE acetate (PRED FORTE) 1 % DrpS; Place 1 drop into the right eye 3 (three) times daily for 5 days, THEN 1 drop 2 (two) times daily for 4 days, THEN 1 drop once daily for 3 days. Use one drop in the right eye 3 times daily; Keep on file; no refill needed yet.  Dispense: 5 mL; Refill: 1    2. Chalazion of right lower eyelid  - azithromycin (ZITHROMAX) 500 MG tablet; Take 1 tablet (500 mg total) by mouth once daily.  Dispense: 3 tablet; Refill: 0    3. Inconsistent vision with multifocal contact lenses trialed thus far:  Air Optix Multifocal   R1: -0.50/Med   L1: -0.25/Med      R2: -0.50/Hi   L2: -0.25/Hi     Biofinity Multifocal  OD -0.50/+2.50 D  OS -0.25/+2.50 D     Biofinity Energys 8.6/14.0  OD -0.50  OS -0.25     Dailies Total Multifocal  8.5/14.1  OD -0.50/Lo  OS -0.50/Lo     Dailies Total Multifocal  8.5/14.1  OD -0.50/Med  OS -0.25/Hi    Dispense Trials:     Ultra for Presbyopia 8.5/14.2  -0.50 / High  -0.50/High    -0.25/High  -0.25/High    -0.50/Med  -0.50/Med    -0.25/Med  -0.25/Med    Contact lens trial sheet sent via email      [facility] FU in 2 weeks              Glasses Prescription (5/28/2018)        Sphere Cylinder Axis Dist VA Add    Right -0.75 +0.25 180 20/20 +1.50    Left -0.50 +0.25 155 20/20 +1.50     Expiration Date:  7/21/2019

## 2019-08-14 ENCOUNTER — CLINICAL SUPPORT (OUTPATIENT)
Dept: INTERNAL MEDICINE | Facility: CLINIC | Age: 49
End: 2019-08-14
Payer: COMMERCIAL

## 2019-08-14 DIAGNOSIS — J30.2 SEASONAL ALLERGIC RHINITIS, UNSPECIFIED TRIGGER: ICD-10-CM

## 2019-08-14 PROCEDURE — 99499 NO LOS: ICD-10-PCS | Mod: S$GLB,,, | Performed by: ALLERGY & IMMUNOLOGY

## 2019-08-14 PROCEDURE — 95117 PR IMMU2THERAPY, 2+ INJECTIONS: ICD-10-PCS | Mod: S$GLB,,, | Performed by: FAMILY MEDICINE

## 2019-08-14 PROCEDURE — 99499 UNLISTED E&M SERVICE: CPT | Mod: S$GLB,,, | Performed by: ALLERGY & IMMUNOLOGY

## 2019-08-14 PROCEDURE — 95117 IMMUNOTHERAPY INJECTIONS: CPT | Mod: S$GLB,,, | Performed by: FAMILY MEDICINE

## 2019-09-04 ENCOUNTER — CLINICAL SUPPORT (OUTPATIENT)
Dept: INTERNAL MEDICINE | Facility: CLINIC | Age: 49
End: 2019-09-04
Payer: COMMERCIAL

## 2019-09-04 DIAGNOSIS — J30.2 SEASONAL ALLERGIC RHINITIS, UNSPECIFIED TRIGGER: ICD-10-CM

## 2019-09-04 PROCEDURE — 99499 NO LOS: ICD-10-PCS | Mod: S$GLB,,, | Performed by: ALLERGY & IMMUNOLOGY

## 2019-09-04 PROCEDURE — 99499 UNLISTED E&M SERVICE: CPT | Mod: S$GLB,,, | Performed by: ALLERGY & IMMUNOLOGY

## 2019-09-04 PROCEDURE — 95117 PR IMMU2THERAPY, 2+ INJECTIONS: ICD-10-PCS | Mod: S$GLB,,, | Performed by: FAMILY MEDICINE

## 2019-09-04 PROCEDURE — 95117 IMMUNOTHERAPY INJECTIONS: CPT | Mod: S$GLB,,, | Performed by: FAMILY MEDICINE

## 2019-09-10 ENCOUNTER — CLINICAL SUPPORT (OUTPATIENT)
Dept: ALLERGY | Facility: CLINIC | Age: 49
End: 2019-09-10
Payer: COMMERCIAL

## 2019-09-10 DIAGNOSIS — J30.2 SEASONAL ALLERGIC RHINITIS, UNSPECIFIED TRIGGER: ICD-10-CM

## 2019-09-10 PROCEDURE — 99499 UNLISTED E&M SERVICE: CPT | Mod: S$GLB,,, | Performed by: ALLERGY & IMMUNOLOGY

## 2019-09-10 PROCEDURE — 95165 PR PROFES SVC,IMMUNOTHER,SINGLE/MULT AGS: ICD-10-PCS | Mod: S$GLB,,, | Performed by: ALLERGY & IMMUNOLOGY

## 2019-09-10 PROCEDURE — 95165 ANTIGEN THERAPY SERVICES: CPT | Mod: S$GLB,,, | Performed by: ALLERGY & IMMUNOLOGY

## 2019-09-10 PROCEDURE — 99499 NO LOS: ICD-10-PCS | Mod: S$GLB,,, | Performed by: ALLERGY & IMMUNOLOGY

## 2019-09-24 ENCOUNTER — PATIENT MESSAGE (OUTPATIENT)
Dept: DERMATOLOGY | Facility: CLINIC | Age: 49
End: 2019-09-24

## 2019-09-25 ENCOUNTER — OFFICE VISIT (OUTPATIENT)
Dept: DERMATOLOGY | Facility: CLINIC | Age: 49
End: 2019-09-25
Payer: COMMERCIAL

## 2019-09-25 ENCOUNTER — PATIENT OUTREACH (OUTPATIENT)
Dept: ADMINISTRATIVE | Facility: OTHER | Age: 49
End: 2019-09-25

## 2019-09-25 ENCOUNTER — TELEPHONE (OUTPATIENT)
Dept: DERMATOLOGY | Facility: CLINIC | Age: 49
End: 2019-09-25

## 2019-09-25 DIAGNOSIS — R20.2 NOTALGIA PARESTHETICA: ICD-10-CM

## 2019-09-25 DIAGNOSIS — L28.0 LICHEN SIMPLEX CHRONICUS: Primary | ICD-10-CM

## 2019-09-25 PROCEDURE — 99214 PR OFFICE/OUTPT VISIT, EST, LEVL IV, 30-39 MIN: ICD-10-PCS | Mod: S$GLB,,, | Performed by: DERMATOLOGY

## 2019-09-25 PROCEDURE — 99214 OFFICE O/P EST MOD 30 MIN: CPT | Mod: S$GLB,,, | Performed by: DERMATOLOGY

## 2019-09-25 RX ORDER — FLUOCINONIDE 0.5 MG/G
CREAM TOPICAL
Qty: 60 G | Refills: 3 | Status: SHIPPED | OUTPATIENT
Start: 2019-09-25 | End: 2023-03-23

## 2019-09-25 NOTE — PATIENT INSTRUCTIONS
Physical therapy with repetitive exercises and stretches for the upper back has been reported to be effective:  - While sitting, cross arms and bend forwards to stretch upper back  - Arms at sides, raise shoulders and rotate them forwards and backwards  - Arms straight, rotate forwards 360 degrees and backwards 360 degrees  - Rotate upper body left and right until stretch is felt and hold  - Massage the muscles beside the spine in the affected area

## 2019-09-25 NOTE — PROGRESS NOTES
Subjective:       Patient ID:  Shae Trotter is a 48 y.o. female who presents for   Chief Complaint   Patient presents with    Rash     back     Rash  - Initial  Affected locations: back  Duration: 1 year  Signs / symptoms: itching and dryness (discoloration)  Severity: mild to moderate  Timing: intermittent  Aggravated by: nothing  Relieving factors/Treatments tried: OTC hydrocortisone and OTC moisturizer  Improvement on treatment: mild      Review of Systems   Musculoskeletal: Negative for myalgias, joint swelling and arthralgias.        Denies back pain   Skin: Positive for itching and rash.   Hematologic/Lymphatic: Does not bruise/bleed easily.        Objective:    Physical Exam   Constitutional: She appears well-developed and well-nourished. No distress.   HENT:   Mouth/Throat: Lips normal.    Eyes: Lids are normal.  No conjunctival no injection.   Neurological: She is alert and oriented to person, place, and time. She is not disoriented.   Psychiatric: She has a normal mood and affect.   Skin:   Areas Examined (abnormalities noted in diagram):   Head / Face Inspection Performed  Neck Inspection Performed  Chest / Axilla Inspection Performed  Back Inspection Performed              Diagram Legend     Erythematous scaling macule/papule c/w actinic keratosis       Vascular papule c/w angioma      Pigmented verrucoid papule/plaque c/w seborrheic keratosis      Yellow umbilicated papule c/w sebaceous hyperplasia      Irregularly shaped tan macule c/w lentigo     1-2 mm smooth white papules consistent with Milia      Movable subcutaneous cyst with punctum c/w epidermal inclusion cyst      Subcutaneous movable cyst c/w pilar cyst      Firm pink to brown papule c/w dermatofibroma      Pedunculated fleshy papule(s) c/w skin tag(s)      Evenly pigmented macule c/w junctional nevus     Mildly variegated pigmented, slightly irregular-bordered macule c/w mildly atypical nevus      Flesh colored to evenly  pigmented papule c/w intradermal nevus       Pink pearly papule/plaque c/w basal cell carcinoma      Erythematous hyperkeratotic cursted plaque c/w SCC      Surgical scar with no sign of skin cancer recurrence      Open and closed comedones      Inflammatory papules and pustules      Verrucoid papule consistent consistent with wart     Erythematous eczematous patches and plaques     Dystrophic onycholytic nail with subungual debris c/w onychomycosis     Umbilicated papule    Erythematous-base heme-crusted tan verrucoid plaque consistent with inflamed seborrheic keratosis     Erythematous Silvery Scaling Plaque c/w Psoriasis     See annotation      Assessment / Plan:        Lichen simplex chronicus  -     fluocinonide 0.05% (LIDEX) 0.05 % cream; apply to affected area  of back twice daily as needed for  itching.  Dispense: 60 g; Refill: 3  - Discussed with patient the importance of not manipulating the skin lesions and of breaking the itch-scratch cycle. Trauma will exacerbate the condition.   - Recommended CeraVe Itch Relief cream/lotion or Sarna sensitive lotion. Can store these in the refrigerator for an added cooling effect.   - Can also use ice to relieve intense pruritus.    Notalgia paresthetica  Discussed that this condition is usually caused by partial compression, impingement, or other injury to a spinal nerve.    For symptomatic relief, recommended CeraVe Itch Relief cream/lotion or Sarna sensitive lotion. Can store these in the refrigerator for an added cooling effect.    Physical therapy with repetitive exercises and stretches for the upper back has been reported to be effective:  - While sitting, cross arms and bend forwards to stretch upper back  - Arms at sides, raise shoulders and rotate them forwards and backwards  - Arms straight, rotate forwards 360 degrees and backwards 360 degrees  - Rotate upper body left and right until stretch is felt and hold  - Massage the muscles beside the spine in the  affected area    If symptoms are worsening, refer to PCP for imaging of possible degenerative vertebra, herniated disc, or space-occupying lesion.    Follow up if symptoms worsen or fail to improve.

## 2019-09-25 NOTE — TELEPHONE ENCOUNTER
Spoke to pt, she now has an appointment.  ----- Message from Mo Crowder sent at 9/25/2019  9:00 AM CDT -----  Contact: SÁNCHEZ MOSS [[id number]]  Type:  Patient Returning Call    Who Called: SÁNCHEZ MOSS [[id number]]    Who Left Message for Patient: Shahrzad    Does the patient know what this is regarding?: yes    Would the patient rather a call back or a response via [facility]? call    Best Call Back Number: [phone]    Additional Information:

## 2019-10-02 ENCOUNTER — CLINICAL SUPPORT (OUTPATIENT)
Dept: INTERNAL MEDICINE | Facility: CLINIC | Age: 49
End: 2019-10-02
Payer: COMMERCIAL

## 2019-10-02 DIAGNOSIS — J30.2 SEASONAL ALLERGIC RHINITIS, UNSPECIFIED TRIGGER: ICD-10-CM

## 2019-10-02 PROCEDURE — 99499 NO LOS: ICD-10-PCS | Mod: S$GLB,,, | Performed by: ALLERGY & IMMUNOLOGY

## 2019-10-02 PROCEDURE — 95117 IMMUNOTHERAPY INJECTIONS: CPT | Mod: S$GLB,,, | Performed by: FAMILY MEDICINE

## 2019-10-02 PROCEDURE — 99499 UNLISTED E&M SERVICE: CPT | Mod: S$GLB,,, | Performed by: ALLERGY & IMMUNOLOGY

## 2019-10-02 PROCEDURE — 95117 PR IMMU2THERAPY, 2+ INJECTIONS: ICD-10-PCS | Mod: S$GLB,,, | Performed by: FAMILY MEDICINE

## 2019-10-09 ENCOUNTER — CLINICAL SUPPORT (OUTPATIENT)
Dept: INTERNAL MEDICINE | Facility: CLINIC | Age: 49
End: 2019-10-09
Payer: COMMERCIAL

## 2019-10-09 DIAGNOSIS — J30.2 SEASONAL ALLERGIC RHINITIS, UNSPECIFIED TRIGGER: ICD-10-CM

## 2019-10-09 PROCEDURE — 95117 IMMUNOTHERAPY INJECTIONS: CPT | Mod: S$GLB,,, | Performed by: FAMILY MEDICINE

## 2019-10-09 PROCEDURE — 95117 PR IMMU2THERAPY, 2+ INJECTIONS: ICD-10-PCS | Mod: S$GLB,,, | Performed by: FAMILY MEDICINE

## 2019-10-09 PROCEDURE — 99499 NO LOS: ICD-10-PCS | Mod: S$GLB,,, | Performed by: ALLERGY & IMMUNOLOGY

## 2019-10-09 PROCEDURE — 99499 UNLISTED E&M SERVICE: CPT | Mod: S$GLB,,, | Performed by: ALLERGY & IMMUNOLOGY

## 2019-10-21 ENCOUNTER — CLINICAL SUPPORT (OUTPATIENT)
Dept: INTERNAL MEDICINE | Facility: CLINIC | Age: 49
End: 2019-10-21
Payer: COMMERCIAL

## 2019-10-21 DIAGNOSIS — J30.9 ALLERGIC RHINITIS, UNSPECIFIED SEASONALITY, UNSPECIFIED TRIGGER: ICD-10-CM

## 2019-10-21 PROCEDURE — 99499 UNLISTED E&M SERVICE: CPT | Mod: S$GLB,,, | Performed by: ALLERGY & IMMUNOLOGY

## 2019-10-21 PROCEDURE — 95117 IMMUNOTHERAPY INJECTIONS: CPT | Mod: S$GLB,,, | Performed by: FAMILY MEDICINE

## 2019-10-21 PROCEDURE — 99499 NO LOS: ICD-10-PCS | Mod: S$GLB,,, | Performed by: ALLERGY & IMMUNOLOGY

## 2019-10-21 PROCEDURE — 95117 PR IMMU2THERAPY, 2+ INJECTIONS: ICD-10-PCS | Mod: S$GLB,,, | Performed by: FAMILY MEDICINE

## 2019-11-11 ENCOUNTER — CLINICAL SUPPORT (OUTPATIENT)
Dept: INTERNAL MEDICINE | Facility: CLINIC | Age: 49
End: 2019-11-11
Payer: COMMERCIAL

## 2019-11-11 DIAGNOSIS — J30.9 CHRONIC ALLERGIC RHINITIS: ICD-10-CM

## 2019-11-11 PROCEDURE — 95117 PR IMMU2THERAPY, 2+ INJECTIONS: ICD-10-PCS | Mod: S$GLB,,, | Performed by: FAMILY MEDICINE

## 2019-11-11 PROCEDURE — 99499 UNLISTED E&M SERVICE: CPT | Mod: S$GLB,,, | Performed by: ALLERGY & IMMUNOLOGY

## 2019-11-11 PROCEDURE — 99499 NO LOS: ICD-10-PCS | Mod: S$GLB,,, | Performed by: ALLERGY & IMMUNOLOGY

## 2019-11-11 PROCEDURE — 95117 IMMUNOTHERAPY INJECTIONS: CPT | Mod: S$GLB,,, | Performed by: FAMILY MEDICINE

## 2019-12-11 ENCOUNTER — CLINICAL SUPPORT (OUTPATIENT)
Dept: INTERNAL MEDICINE | Facility: CLINIC | Age: 49
End: 2019-12-11
Payer: COMMERCIAL

## 2019-12-11 DIAGNOSIS — J30.9 CHRONIC ALLERGIC RHINITIS: ICD-10-CM

## 2019-12-11 PROCEDURE — 95117 PR IMMU2THERAPY, 2+ INJECTIONS: ICD-10-PCS | Mod: S$GLB,,, | Performed by: FAMILY MEDICINE

## 2019-12-11 PROCEDURE — 95117 IMMUNOTHERAPY INJECTIONS: CPT | Mod: S$GLB,,, | Performed by: FAMILY MEDICINE

## 2019-12-11 PROCEDURE — 99499 UNLISTED E&M SERVICE: CPT | Mod: S$GLB,,, | Performed by: ALLERGY & IMMUNOLOGY

## 2019-12-11 PROCEDURE — 99499 NO LOS: ICD-10-PCS | Mod: S$GLB,,, | Performed by: ALLERGY & IMMUNOLOGY

## 2019-12-23 ENCOUNTER — CLINICAL SUPPORT (OUTPATIENT)
Dept: INTERNAL MEDICINE | Facility: CLINIC | Age: 49
End: 2019-12-23
Payer: COMMERCIAL

## 2019-12-23 DIAGNOSIS — J30.9 ALLERGIC RHINITIS, UNSPECIFIED SEASONALITY, UNSPECIFIED TRIGGER: ICD-10-CM

## 2019-12-23 DIAGNOSIS — J30.2 SEASONAL ALLERGIC RHINITIS, UNSPECIFIED TRIGGER: ICD-10-CM

## 2019-12-23 PROCEDURE — 95117 IMMUNOTHERAPY INJECTIONS: CPT | Mod: S$GLB,,, | Performed by: FAMILY MEDICINE

## 2019-12-23 PROCEDURE — 99499 UNLISTED E&M SERVICE: CPT | Mod: S$GLB,,, | Performed by: ALLERGY & IMMUNOLOGY

## 2019-12-23 PROCEDURE — 95117 PR IMMU2THERAPY, 2+ INJECTIONS: ICD-10-PCS | Mod: S$GLB,,, | Performed by: FAMILY MEDICINE

## 2019-12-23 PROCEDURE — 99499 NO LOS: ICD-10-PCS | Mod: S$GLB,,, | Performed by: ALLERGY & IMMUNOLOGY

## 2020-01-24 ENCOUNTER — CLINICAL SUPPORT (OUTPATIENT)
Dept: INTERNAL MEDICINE | Facility: CLINIC | Age: 50
End: 2020-01-24
Payer: COMMERCIAL

## 2020-01-24 DIAGNOSIS — J30.9 ALLERGIC RHINITIS, UNSPECIFIED SEASONALITY, UNSPECIFIED TRIGGER: ICD-10-CM

## 2020-01-24 PROCEDURE — 95117 IMMUNOTHERAPY INJECTIONS: CPT | Mod: S$GLB,,, | Performed by: FAMILY MEDICINE

## 2020-01-24 PROCEDURE — 95117 PR IMMU2THERAPY, 2+ INJECTIONS: ICD-10-PCS | Mod: S$GLB,,, | Performed by: FAMILY MEDICINE

## 2020-01-24 PROCEDURE — 99499 UNLISTED E&M SERVICE: CPT | Mod: S$GLB,,, | Performed by: ALLERGY & IMMUNOLOGY

## 2020-01-24 PROCEDURE — 99499 NO LOS: ICD-10-PCS | Mod: S$GLB,,, | Performed by: ALLERGY & IMMUNOLOGY

## 2020-01-31 ENCOUNTER — CLINICAL SUPPORT (OUTPATIENT)
Dept: INTERNAL MEDICINE | Facility: CLINIC | Age: 50
End: 2020-01-31
Payer: COMMERCIAL

## 2020-01-31 DIAGNOSIS — J30.9 CHRONIC ALLERGIC RHINITIS: ICD-10-CM

## 2020-01-31 PROCEDURE — 95117 IMMUNOTHERAPY INJECTIONS: CPT | Mod: S$GLB,,, | Performed by: FAMILY MEDICINE

## 2020-01-31 PROCEDURE — 99499 UNLISTED E&M SERVICE: CPT | Mod: S$GLB,,, | Performed by: ALLERGY & IMMUNOLOGY

## 2020-01-31 PROCEDURE — 99499 NO LOS: ICD-10-PCS | Mod: S$GLB,,, | Performed by: ALLERGY & IMMUNOLOGY

## 2020-01-31 PROCEDURE — 95117 PR IMMU2THERAPY, 2+ INJECTIONS: ICD-10-PCS | Mod: S$GLB,,, | Performed by: FAMILY MEDICINE

## 2020-02-26 ENCOUNTER — PATIENT MESSAGE (OUTPATIENT)
Dept: ALLERGY | Facility: CLINIC | Age: 50
End: 2020-02-26

## 2020-02-26 DIAGNOSIS — Z91.018 FOOD ALLERGY: Primary | ICD-10-CM

## 2020-02-26 RX ORDER — EPINEPHRINE 0.3 MG/.3ML
1 INJECTION SUBCUTANEOUS ONCE
Qty: 2 EACH | Refills: 2 | Status: SHIPPED | OUTPATIENT
Start: 2020-02-26 | End: 2022-06-14 | Stop reason: SDUPTHER

## 2020-02-26 NOTE — TELEPHONE ENCOUNTER
----- Message from Elvira Looney sent at 2/26/2020 10:02 AM CST -----  Contact: patient   Patient needs refill on Epic Pen.  Please contact patient at [phone]

## 2020-02-26 NOTE — TELEPHONE ENCOUNTER
----- Message from Sunny Mujica MD sent at 2/26/2020 12:17 PM CST -----  Patient called about an EpiPen refill which I am ok with but she's over-due to come in and see us. Would you (or someone else) call her back to see about getting her an appointment, and to see if she really needs/wants an EpPen?

## 2020-02-28 ENCOUNTER — CLINICAL SUPPORT (OUTPATIENT)
Dept: INTERNAL MEDICINE | Facility: CLINIC | Age: 50
End: 2020-02-28
Payer: COMMERCIAL

## 2020-02-28 DIAGNOSIS — J30.9 CHRONIC ALLERGIC RHINITIS: ICD-10-CM

## 2020-02-28 PROCEDURE — 95117 IMMUNOTHERAPY INJECTIONS: CPT | Mod: S$GLB,,, | Performed by: FAMILY MEDICINE

## 2020-02-28 PROCEDURE — 99499 NO LOS: ICD-10-PCS | Mod: S$GLB,,, | Performed by: ALLERGY & IMMUNOLOGY

## 2020-02-28 PROCEDURE — 99499 UNLISTED E&M SERVICE: CPT | Mod: S$GLB,,, | Performed by: ALLERGY & IMMUNOLOGY

## 2020-02-28 PROCEDURE — 95117 PR IMMU2THERAPY, 2+ INJECTIONS: ICD-10-PCS | Mod: S$GLB,,, | Performed by: FAMILY MEDICINE

## 2020-03-09 ENCOUNTER — PATIENT OUTREACH (OUTPATIENT)
Dept: ADMINISTRATIVE | Facility: OTHER | Age: 50
End: 2020-03-09

## 2020-03-09 DIAGNOSIS — Z12.11 ENCOUNTER FOR FIT (FECAL IMMUNOCHEMICAL TEST) SCREENING: Primary | ICD-10-CM

## 2020-03-23 ENCOUNTER — PATIENT MESSAGE (OUTPATIENT)
Dept: OPTOMETRY | Facility: CLINIC | Age: 50
End: 2020-03-23

## 2020-03-27 ENCOUNTER — CLINICAL SUPPORT (OUTPATIENT)
Dept: INTERNAL MEDICINE | Facility: CLINIC | Age: 50
End: 2020-03-27
Payer: COMMERCIAL

## 2020-03-27 DIAGNOSIS — J30.9 CHRONIC ALLERGIC RHINITIS: ICD-10-CM

## 2020-03-27 PROCEDURE — 95117 IMMUNOTHERAPY INJECTIONS: CPT | Mod: S$GLB,,, | Performed by: FAMILY MEDICINE

## 2020-03-27 PROCEDURE — 95117 PR IMMU2THERAPY, 2+ INJECTIONS: ICD-10-PCS | Mod: S$GLB,,, | Performed by: FAMILY MEDICINE

## 2020-03-27 PROCEDURE — 99499 UNLISTED E&M SERVICE: CPT | Mod: S$GLB,,, | Performed by: ALLERGY & IMMUNOLOGY

## 2020-03-27 PROCEDURE — 99499 NO LOS: ICD-10-PCS | Mod: S$GLB,,, | Performed by: ALLERGY & IMMUNOLOGY

## 2020-04-03 ENCOUNTER — CLINICAL SUPPORT (OUTPATIENT)
Dept: INTERNAL MEDICINE | Facility: CLINIC | Age: 50
End: 2020-04-03
Payer: COMMERCIAL

## 2020-04-03 DIAGNOSIS — J30.9 CHRONIC ALLERGIC RHINITIS: ICD-10-CM

## 2020-04-03 DIAGNOSIS — Z12.11 COLON CANCER SCREENING: ICD-10-CM

## 2020-04-03 PROCEDURE — 95117 IMMUNOTHERAPY INJECTIONS: CPT | Mod: S$GLB,,, | Performed by: FAMILY MEDICINE

## 2020-04-03 PROCEDURE — 99499 NO LOS: ICD-10-PCS | Mod: S$GLB,,, | Performed by: ALLERGY & IMMUNOLOGY

## 2020-04-03 PROCEDURE — 95117 PR IMMU2THERAPY, 2+ INJECTIONS: ICD-10-PCS | Mod: S$GLB,,, | Performed by: FAMILY MEDICINE

## 2020-04-03 PROCEDURE — 99499 UNLISTED E&M SERVICE: CPT | Mod: S$GLB,,, | Performed by: ALLERGY & IMMUNOLOGY

## 2020-04-03 NOTE — PROGRESS NOTES
See Media Tab for Allergy Injection information.    Pt decided not to do the split dose today.  She may reconsider for next appt.

## 2020-04-15 ENCOUNTER — CLINICAL SUPPORT (OUTPATIENT)
Dept: INTERNAL MEDICINE | Facility: CLINIC | Age: 50
End: 2020-04-15
Payer: COMMERCIAL

## 2020-04-15 DIAGNOSIS — J30.1 CHRONIC SEASONAL ALLERGIC RHINITIS DUE TO POLLEN: ICD-10-CM

## 2020-04-15 PROCEDURE — 99499 UNLISTED E&M SERVICE: CPT | Mod: S$GLB,,, | Performed by: ALLERGY & IMMUNOLOGY

## 2020-04-15 PROCEDURE — 95117 IMMUNOTHERAPY INJECTIONS: CPT | Mod: S$GLB,,, | Performed by: FAMILY MEDICINE

## 2020-04-15 PROCEDURE — 95117 PR IMMU2THERAPY, 2+ INJECTIONS: ICD-10-PCS | Mod: S$GLB,,, | Performed by: FAMILY MEDICINE

## 2020-04-15 PROCEDURE — 99499 NO LOS: ICD-10-PCS | Mod: S$GLB,,, | Performed by: ALLERGY & IMMUNOLOGY

## 2020-04-21 DIAGNOSIS — Z01.84 ANTIBODY RESPONSE EXAMINATION: ICD-10-CM

## 2020-04-24 ENCOUNTER — LAB VISIT (OUTPATIENT)
Dept: LAB | Facility: HOSPITAL | Age: 50
End: 2020-04-24
Attending: INTERNAL MEDICINE
Payer: COMMERCIAL

## 2020-04-24 ENCOUNTER — CLINICAL SUPPORT (OUTPATIENT)
Dept: INTERNAL MEDICINE | Facility: CLINIC | Age: 50
End: 2020-04-24
Payer: COMMERCIAL

## 2020-04-24 DIAGNOSIS — Z01.84 ANTIBODY RESPONSE EXAMINATION: ICD-10-CM

## 2020-04-24 DIAGNOSIS — J30.9 CHRONIC ALLERGIC RHINITIS: ICD-10-CM

## 2020-04-24 LAB — SARS-COV-2 IGG SERPL QL IA: NEGATIVE

## 2020-04-24 PROCEDURE — 99499 NO LOS: ICD-10-PCS | Mod: S$GLB,,, | Performed by: ALLERGY & IMMUNOLOGY

## 2020-04-24 PROCEDURE — 86769 SARS-COV-2 COVID-19 ANTIBODY: CPT

## 2020-04-24 PROCEDURE — 95117 IMMUNOTHERAPY INJECTIONS: CPT | Mod: S$GLB,,, | Performed by: INTERNAL MEDICINE

## 2020-04-24 PROCEDURE — 95117 PR IMMU2THERAPY, 2+ INJECTIONS: ICD-10-PCS | Mod: S$GLB,,, | Performed by: INTERNAL MEDICINE

## 2020-04-24 PROCEDURE — 99499 UNLISTED E&M SERVICE: CPT | Mod: S$GLB,,, | Performed by: ALLERGY & IMMUNOLOGY

## 2020-04-24 PROCEDURE — 36415 COLL VENOUS BLD VENIPUNCTURE: CPT

## 2020-05-01 ENCOUNTER — CLINICAL SUPPORT (OUTPATIENT)
Dept: INTERNAL MEDICINE | Facility: CLINIC | Age: 50
End: 2020-05-01
Payer: COMMERCIAL

## 2020-05-01 DIAGNOSIS — J30.9 CHRONIC ALLERGIC RHINITIS: ICD-10-CM

## 2020-05-01 PROCEDURE — 99499 NO LOS: ICD-10-PCS | Mod: S$GLB,,, | Performed by: ALLERGY & IMMUNOLOGY

## 2020-05-01 PROCEDURE — 99499 UNLISTED E&M SERVICE: CPT | Mod: S$GLB,,, | Performed by: ALLERGY & IMMUNOLOGY

## 2020-05-01 PROCEDURE — 95117 IMMUNOTHERAPY INJECTIONS: CPT | Mod: S$GLB,,, | Performed by: INTERNAL MEDICINE

## 2020-05-01 PROCEDURE — 95117 PR IMMU2THERAPY, 2+ INJECTIONS: ICD-10-PCS | Mod: S$GLB,,, | Performed by: INTERNAL MEDICINE

## 2020-05-13 ENCOUNTER — CLINICAL SUPPORT (OUTPATIENT)
Dept: INTERNAL MEDICINE | Facility: CLINIC | Age: 50
End: 2020-05-13
Payer: COMMERCIAL

## 2020-05-13 DIAGNOSIS — J30.9 ALLERGIC RHINITIS, UNSPECIFIED SEASONALITY, UNSPECIFIED TRIGGER: ICD-10-CM

## 2020-05-13 PROCEDURE — 99499 NO LOS: ICD-10-PCS | Mod: S$GLB,,, | Performed by: ALLERGY & IMMUNOLOGY

## 2020-05-13 PROCEDURE — 95117 IMMUNOTHERAPY INJECTIONS: CPT | Mod: S$GLB,,, | Performed by: FAMILY MEDICINE

## 2020-05-13 PROCEDURE — 95117 PR IMMU2THERAPY, 2+ INJECTIONS: ICD-10-PCS | Mod: S$GLB,,, | Performed by: FAMILY MEDICINE

## 2020-05-13 PROCEDURE — 99499 UNLISTED E&M SERVICE: CPT | Mod: S$GLB,,, | Performed by: ALLERGY & IMMUNOLOGY

## 2020-05-22 ENCOUNTER — CLINICAL SUPPORT (OUTPATIENT)
Dept: INTERNAL MEDICINE | Facility: CLINIC | Age: 50
End: 2020-05-22
Payer: COMMERCIAL

## 2020-05-22 DIAGNOSIS — J30.1 NON-SEASONAL ALLERGIC RHINITIS DUE TO POLLEN: ICD-10-CM

## 2020-05-22 PROCEDURE — 95117 PR IMMU2THERAPY, 2+ INJECTIONS: ICD-10-PCS | Mod: S$GLB,,, | Performed by: FAMILY MEDICINE

## 2020-05-22 PROCEDURE — 99499 NO LOS: ICD-10-PCS | Mod: S$GLB,,, | Performed by: ALLERGY & IMMUNOLOGY

## 2020-05-22 PROCEDURE — 99499 UNLISTED E&M SERVICE: CPT | Mod: S$GLB,,, | Performed by: ALLERGY & IMMUNOLOGY

## 2020-05-22 PROCEDURE — 95117 IMMUNOTHERAPY INJECTIONS: CPT | Mod: S$GLB,,, | Performed by: FAMILY MEDICINE

## 2020-05-27 ENCOUNTER — CLINICAL SUPPORT (OUTPATIENT)
Dept: INTERNAL MEDICINE | Facility: CLINIC | Age: 50
End: 2020-05-27
Payer: COMMERCIAL

## 2020-05-27 DIAGNOSIS — J30.9 CHRONIC ALLERGIC RHINITIS: ICD-10-CM

## 2020-05-27 PROCEDURE — 99499 NO LOS: ICD-10-PCS | Mod: S$GLB,,, | Performed by: ALLERGY & IMMUNOLOGY

## 2020-05-27 PROCEDURE — 95117 PR IMMU2THERAPY, 2+ INJECTIONS: ICD-10-PCS | Mod: S$GLB,,, | Performed by: FAMILY MEDICINE

## 2020-05-27 PROCEDURE — 99499 UNLISTED E&M SERVICE: CPT | Mod: S$GLB,,, | Performed by: ALLERGY & IMMUNOLOGY

## 2020-05-27 PROCEDURE — 95117 IMMUNOTHERAPY INJECTIONS: CPT | Mod: S$GLB,,, | Performed by: FAMILY MEDICINE

## 2020-06-03 ENCOUNTER — TELEPHONE (OUTPATIENT)
Dept: INTERNAL MEDICINE | Facility: CLINIC | Age: 50
End: 2020-06-03

## 2020-06-03 NOTE — TELEPHONE ENCOUNTER
----- Message from Jackie Boyer sent at 6/3/2020  4:08 PM CDT -----  Contact: PT   PT is requesting a np appointment to establish care and for stomach pains,   She can be reached at [phone]    Thanks

## 2020-06-15 ENCOUNTER — OFFICE VISIT (OUTPATIENT)
Dept: INTERNAL MEDICINE | Facility: CLINIC | Age: 50
End: 2020-06-15
Payer: COMMERCIAL

## 2020-06-15 VITALS
BODY MASS INDEX: 19.43 KG/M2 | HEART RATE: 92 BPM | WEIGHT: 102.94 LBS | OXYGEN SATURATION: 99 % | DIASTOLIC BLOOD PRESSURE: 68 MMHG | HEIGHT: 61 IN | SYSTOLIC BLOOD PRESSURE: 104 MMHG

## 2020-06-15 DIAGNOSIS — Z12.11 SCREEN FOR COLON CANCER: ICD-10-CM

## 2020-06-15 DIAGNOSIS — Z00.00 VISIT FOR ANNUAL HEALTH EXAMINATION: Primary | ICD-10-CM

## 2020-06-15 DIAGNOSIS — J30.89 SEASONAL ALLERGIC RHINITIS DUE TO OTHER ALLERGIC TRIGGER: ICD-10-CM

## 2020-06-15 DIAGNOSIS — Z13.29 SCREENING FOR THYROID DISORDER: ICD-10-CM

## 2020-06-15 DIAGNOSIS — R10.10 UPPER ABDOMINAL PAIN: ICD-10-CM

## 2020-06-15 DIAGNOSIS — Z13.1 SCREENING FOR DIABETES MELLITUS: ICD-10-CM

## 2020-06-15 PROCEDURE — 99999 PR PBB SHADOW E&M-EST. PATIENT-LVL IV: CPT | Mod: PBBFAC,,, | Performed by: INTERNAL MEDICINE

## 2020-06-15 PROCEDURE — 99999 PR PBB SHADOW E&M-EST. PATIENT-LVL IV: ICD-10-PCS | Mod: PBBFAC,,, | Performed by: INTERNAL MEDICINE

## 2020-06-15 PROCEDURE — 99396 PR PREVENTIVE VISIT,EST,40-64: ICD-10-PCS | Mod: S$GLB,,, | Performed by: INTERNAL MEDICINE

## 2020-06-15 PROCEDURE — 99396 PREV VISIT EST AGE 40-64: CPT | Mod: S$GLB,,, | Performed by: INTERNAL MEDICINE

## 2020-06-15 RX ORDER — LEVOCETIRIZINE DIHYDROCHLORIDE 5 MG/1
5 TABLET, FILM COATED ORAL NIGHTLY
Qty: 90 TABLET | Refills: 3 | Status: SHIPPED | OUTPATIENT
Start: 2020-06-15 | End: 2021-11-15 | Stop reason: SDUPTHER

## 2020-06-15 NOTE — PATIENT INSTRUCTIONS
A referral for a colonoscopy has been placed.  Please call [phone] to schedule.    Please contact [employer] to get your Tdap vaccination which is due.

## 2020-06-15 NOTE — PROGRESS NOTES
INTERNAL MEDICINE INITIAL VISIT NOTE      CHIEF COMPLAINT     Chief Complaint   Patient presents with    Establish Care    Abdominal Pain       HPI     Shae Trotter is a 49 y.o. AA female who presents with  Hx breast CA (dx'ed in 2006 on L and s/p L mastectomy, then had abnormal mmg in 2015 and had +BRCA2 testing so had R sided mastectomy as well, s/p chemo, no XRT, LN neg per hx, also had total hyst after BRCA testing done), allergic rhinitis c chronic urticaria, former pt of Dr. Galicia and then [facility] clinic, here today to establish care.    Today c c/o episode of abd pain which started about 2 weeks ago, was having RUQ pain c radiation to epigastric region.  Described as stabbing to dull ache, woke her up in the middle of the night.  Lasted about two days.  No n/v.  No blood in stools, No black/tarry stools.  Unsure if worse p eating.  Has not had issues since that time.  Possible mild heartburn sx but cannot recall what she had for dinner around that time.    Sees Dr. Dinero for chronic skin issues c lichen simplex chronicus.    Gets regular allergy shots via Dr. Mujica in [facility].  Reports no recent issues.  Carries epi pen for his allergic reaction to cherries.    Past Medical History:  Past Medical History:   Diagnosis Date    Allergy     taking allx shots since 2006    Breast cancer 2006    breast cancer - tx with mastectomy, chemo - followed by Sunny Basurto    Genetic testing 11/2014    BRCA2 deleterious mutation    Other screening mammogram 6/24/2015    Seasonal allergies        Past Surgical History:  Past Surgical History:   Procedure Laterality Date    COSMETIC SURGERY      right mastectomy after genetic testing returned    D&C hysterscope.      HYSTERECTOMY  4/13/15    Robotic LH/BSO     Left mastectomy and chemo Left 2006    TRAM flap reconstruction     MASTECTOMY Left 2006    OK REMOVAL OF OVARY/TUBE(S)      Right breast cysts removed      tram flap  Left 2006    breast  reconstruction.        Home Medications:  Prior to Admission medications    Medication Sig Start Date End Date Taking? Authorizing Provider   EPINEPHrine (EPIPEN) 0.3 mg/0.3 mL AtIn Inject 0.3 mLs (0.3 mg total) into the muscle once. for 1 dose 2/26/20 6/15/20 Yes Margarita Amanda MD   fluocinonide 0.05% (LIDEX) 0.05 % cream apply to affected area  of back twice daily as needed for  itching. 19  Yes Mragarita Barone MD   fluticasone propionate (FLONASE) 50 mcg/actuation nasal spray 1 spray (50 mcg total) by Each Nare route once daily. 19  Yes Cesra Quigley MD   azithromycin (ZITHROMAX) 500 MG tablet Take 1 tablet (500 mg total) by mouth once daily.  Patient not taking: Reported on 2019   Rocky Lamb, JOSÉ MIGUEL       Allergies:  Review of patient's allergies indicates:   Allergen Reactions    Fish containing products Anaphylaxis    Aspirin      Other reaction(s): Hives    Avocado Hives     Other reaction(s): Unknown    Fruit extracts Hives     Other reaction(s): Unknown  Allergic to most fruits - hives/swelling    Minocycline Other (See Comments)     dizziness    Penicillins Hives       Family History:  Family History   Problem Relation Age of Onset    Hyperlipidemia Mother     Arrhythmia Mother         on Digoxin    Cataracts Mother     Diabetes Father     Hyperlipidemia Father     Heart disease Father 64         2/2 to MI    No Known Problems Brother     No Known Problems Daughter     Allergic rhinitis Son     No Known Problems Brother     Ovarian cancer Neg Hx     Uterine cancer Neg Hx     Breast cancer Neg Hx     Colon cancer Neg Hx     Amblyopia Neg Hx     Blindness Neg Hx     Cancer Neg Hx     Glaucoma Neg Hx     Hypertension Neg Hx     Retinal detachment Neg Hx     Strabismus Neg Hx     Macular degeneration Neg Hx     Stroke Neg Hx     Thyroid disease Neg Hx     Melanoma Neg Hx        Social History:  Social History     Tobacco Use    Smoking  "status: Never Smoker    Smokeless tobacco: Never Used   Substance Use Topics    Alcohol use: No     Alcohol/week: 0.0 standard drinks     Frequency: 2-4 times a month     Drinks per session: 1 or 2     Binge frequency: Never    Drug use: No       Review of Systems:  Review of Systems   Constitutional: Negative for activity change and unexpected weight change.   HENT: Negative for hearing loss, rhinorrhea and trouble swallowing.    Eyes: Negative for discharge and visual disturbance.   Respiratory: Negative for chest tightness and wheezing.    Cardiovascular: Negative for chest pain and palpitations.   Gastrointestinal: Positive for abdominal pain (as per HPI). Negative for blood in stool, constipation, diarrhea and vomiting.   Endocrine: Negative for polydipsia and polyuria.   Genitourinary: Negative for difficulty urinating, dysuria, hematuria and menstrual problem.   Musculoskeletal: Negative for arthralgias, joint swelling and neck pain.   Neurological: Negative for weakness and headaches.   Psychiatric/Behavioral: Negative for confusion and dysphoric mood.       Health Maintenance:   Immunizations:   Influenza 10/2019  TDap rec via   Prevnar rec at 64  [facility] rec today    Cancer Screening:  PAP: n/a, hx hyst  Mammogram:  n/a  Colonoscopy:  rec now, will order, pt states she will think about it and call when she is ready  DEXA:  Can likely be checked around 55-60 based on BMI and hx total hyst.      PHYSICAL EXAM     /68 (BP Location: Right arm, Patient Position: Sitting, BP Method: Medium (Manual))   Pulse 92   Ht 5' 1" (1.549 m)   Wt 46.7 kg (102 lb 15.3 oz)   LMP 07/10/2012   SpO2 99%   BMI 19.45 kg/m²     GEN - A+OX4, NAD, thin  HEENT - PERRL, EOMI, OP clear  Neck - No thyromegaly or thyroid masses felt.  No cervical lymphadenopathy appreciated.  CV - RRR, no m/r/g  Chest - CTAB, no wheezing, crackles, or rhonchi  Abd - S/NT/ND/+BS.   Ext - 2+BDP. No C/C/E.  LN - No LAD appreciated.  Skin - " Normal color and texture, no rash, no skin lesions.      LABS     Lab Results   Component Value Date    WBC 5.73 06/07/2017    HGB 12.5 06/07/2017    HCT 40.0 06/07/2017    MCV 84 06/07/2017     (H) 06/07/2017     Sodium   Date Value Ref Range Status   06/07/2017 141 136 - 145 mmol/L Final     Potassium   Date Value Ref Range Status   06/07/2017 4.2 3.5 - 5.1 mmol/L Final     Chloride   Date Value Ref Range Status   06/07/2017 105 95 - 110 mmol/L Final     CO2   Date Value Ref Range Status   06/07/2017 28 23 - 29 mmol/L Final     Glucose   Date Value Ref Range Status   06/07/2017 86 70 - 110 mg/dL Final     BUN, Bld   Date Value Ref Range Status   06/07/2017 15 6 - 20 mg/dL Final     Creatinine   Date Value Ref Range Status   06/07/2017 0.8 0.5 - 1.4 mg/dL Final     Calcium   Date Value Ref Range Status   06/07/2017 9.4 8.7 - 10.5 mg/dL Final     Total Protein   Date Value Ref Range Status   06/07/2017 7.5 6.0 - 8.4 g/dL Final     Albumin   Date Value Ref Range Status   06/07/2017 3.8 3.5 - 5.2 g/dL Final     Total Bilirubin   Date Value Ref Range Status   06/07/2017 0.2 0.1 - 1.0 mg/dL Final     Comment:     For infants and newborns, interpretation of results should be based  on gestational age, weight and in agreement with clinical  observations.  Premature Infant recommended reference ranges:  Up to 24 hours.............<8.0 mg/dL  Up to 48 hours............<12.0 mg/dL  3-5 days..................<15.0 mg/dL  6-29 days.................<15.0 mg/dL       Alkaline Phosphatase   Date Value Ref Range Status   06/07/2017 85 55 - 135 U/L Final     AST   Date Value Ref Range Status   06/07/2017 16 10 - 40 U/L Final     ALT   Date Value Ref Range Status   06/07/2017 15 10 - 44 U/L Final     Anion Gap   Date Value Ref Range Status   06/07/2017 8 8 - 16 mmol/L Final     eGFR if    Date Value Ref Range Status   06/07/2017 >60 >60 mL/min/1.73 m^2 Final     eGFR if non    Date Value Ref  Range Status   06/07/2017 >60 >60 mL/min/1.73 m^2 Final     Comment:     Calculation used to obtain the estimated glomerular filtration  rate (eGFR) is the CKD-EPI equation. Since race is unknown   in our information system, the eGFR values for   -American and Non--American patients are given   for each creatinine result.       Lab Results   Component Value Date    HGBA1C 5.3 07/28/2010     Lab Results   Component Value Date    LDLCALC 163.6 (H) 06/07/2017     Lab Results   Component Value Date    TSH 1.011 06/07/2017       ASSESSMENT/PLAN     Shae Trotter is a 49 y.o. female with  Shae was seen today for establish care and abdominal pain.    Diagnoses and all orders for this visit:    Visit for annual health examination  History and physical exam completed.  Health maintenance reviewed as above.  LDL elevated in the past c fam hx HLD, will repeat labs now.  Will consider statin based on fam hx CAD but will calc risk score.  -     CBC auto differential; Future; Expected date: 06/15/2020  -     Comprehensive metabolic panel; Future; Expected date: 06/15/2020  -     Hemoglobin A1C; Future; Expected date: 06/15/2020  -     Lipid Panel; Future; Expected date: 06/15/2020  -     TSH; Future; Expected date: 06/15/2020    Seasonal allergic rhinitis due to other allergic trigger  Stable on allergy shots and xyzal as per [facility]  -     levocetirizine (XYZAL) 5 MG tablet; Take 1 tablet (5 mg total) by mouth every evening.    Upper abdominal pain  As per HPI, now resolved.  ddx gallstones vs gastritis, will check labs and u/s  Also advised to get her csc since now 49  -     US Abdomen Complete; Future; Expected date: 06/15/2020  -     Comprehensive metabolic panel; Future; Expected date: 06/15/2020    Screen for colon cancer  -     Case request GI: COLONOSCOPY    Screening for thyroid disorder  -     TSH; Future; Expected date: 06/15/2020    Screening for diabetes mellitus  -     Hemoglobin A1C; Future;  Expected date: 06/15/2020      HM as above    RTC in 12 months, sooner if needed and depending on labs.  Also advised to contact me if abd pain recurs.    Dalila Phoenix MD  Department of Internal Medicine - [facility]  06/15/2020

## 2020-06-16 ENCOUNTER — LAB VISIT (OUTPATIENT)
Dept: LAB | Facility: HOSPITAL | Age: 50
End: 2020-06-16
Attending: INTERNAL MEDICINE
Payer: COMMERCIAL

## 2020-06-16 DIAGNOSIS — Z13.1 SCREENING FOR DIABETES MELLITUS: ICD-10-CM

## 2020-06-16 DIAGNOSIS — Z13.29 SCREENING FOR THYROID DISORDER: ICD-10-CM

## 2020-06-16 DIAGNOSIS — R10.10 UPPER ABDOMINAL PAIN: ICD-10-CM

## 2020-06-16 DIAGNOSIS — Z00.00 VISIT FOR ANNUAL HEALTH EXAMINATION: ICD-10-CM

## 2020-06-16 LAB
ALBUMIN SERPL BCP-MCNC: 3.9 G/DL (ref 3.5–5.2)
ALP SERPL-CCNC: 77 U/L (ref 55–135)
ALT SERPL W/O P-5'-P-CCNC: 8 U/L (ref 10–44)
ANION GAP SERPL CALC-SCNC: 8 MMOL/L (ref 8–16)
AST SERPL-CCNC: 12 U/L (ref 10–40)
BASOPHILS # BLD AUTO: 0.01 K/UL (ref 0–0.2)
BASOPHILS NFR BLD: 0.2 % (ref 0–1.9)
BILIRUB SERPL-MCNC: 0.3 MG/DL (ref 0.1–1)
BUN SERPL-MCNC: 15 MG/DL (ref 6–20)
CALCIUM SERPL-MCNC: 10.4 MG/DL (ref 8.7–10.5)
CHLORIDE SERPL-SCNC: 106 MMOL/L (ref 95–110)
CHOLEST SERPL-MCNC: 248 MG/DL (ref 120–199)
CHOLEST/HDLC SERPL: 3.6 {RATIO} (ref 2–5)
CO2 SERPL-SCNC: 27 MMOL/L (ref 23–29)
CREAT SERPL-MCNC: 0.8 MG/DL (ref 0.5–1.4)
DIFFERENTIAL METHOD: ABNORMAL
EOSINOPHIL # BLD AUTO: 0 K/UL (ref 0–0.5)
EOSINOPHIL NFR BLD: 0 % (ref 0–8)
ERYTHROCYTE [DISTWIDTH] IN BLOOD BY AUTOMATED COUNT: 14.6 % (ref 11.5–14.5)
EST. GFR  (AFRICAN AMERICAN): >60 ML/MIN/1.73 M^2
EST. GFR  (NON AFRICAN AMERICAN): >60 ML/MIN/1.73 M^2
ESTIMATED AVG GLUCOSE: 105 MG/DL (ref 68–131)
GLUCOSE SERPL-MCNC: 96 MG/DL (ref 70–110)
HBA1C MFR BLD HPLC: 5.3 % (ref 4–5.6)
HCT VFR BLD AUTO: 43.3 % (ref 37–48.5)
HDLC SERPL-MCNC: 69 MG/DL (ref 40–75)
HDLC SERPL: 27.8 % (ref 20–50)
HGB BLD-MCNC: 12.8 G/DL (ref 12–16)
IMM GRANULOCYTES # BLD AUTO: 0.01 K/UL (ref 0–0.04)
IMM GRANULOCYTES NFR BLD AUTO: 0.2 % (ref 0–0.5)
LDLC SERPL CALC-MCNC: 165.4 MG/DL (ref 63–159)
LYMPHOCYTES # BLD AUTO: 1.9 K/UL (ref 1–4.8)
LYMPHOCYTES NFR BLD: 36.2 % (ref 18–48)
MCH RBC QN AUTO: 26.2 PG (ref 27–31)
MCHC RBC AUTO-ENTMCNC: 29.6 G/DL (ref 32–36)
MCV RBC AUTO: 89 FL (ref 82–98)
MONOCYTES # BLD AUTO: 0.3 K/UL (ref 0.3–1)
MONOCYTES NFR BLD: 6.4 % (ref 4–15)
NEUTROPHILS # BLD AUTO: 3 K/UL (ref 1.8–7.7)
NEUTROPHILS NFR BLD: 57 % (ref 38–73)
NONHDLC SERPL-MCNC: 179 MG/DL
NRBC BLD-RTO: 0 /100 WBC
PLATELET # BLD AUTO: 342 K/UL (ref 150–350)
PMV BLD AUTO: 11 FL (ref 9.2–12.9)
POTASSIUM SERPL-SCNC: 4.2 MMOL/L (ref 3.5–5.1)
PROT SERPL-MCNC: 7.4 G/DL (ref 6–8.4)
RBC # BLD AUTO: 4.89 M/UL (ref 4–5.4)
SODIUM SERPL-SCNC: 141 MMOL/L (ref 136–145)
TRIGL SERPL-MCNC: 68 MG/DL (ref 30–150)
TSH SERPL DL<=0.005 MIU/L-ACNC: 1.67 UIU/ML (ref 0.4–4)
WBC # BLD AUTO: 5.28 K/UL (ref 3.9–12.7)

## 2020-06-16 PROCEDURE — 36415 COLL VENOUS BLD VENIPUNCTURE: CPT

## 2020-06-16 PROCEDURE — 84443 ASSAY THYROID STIM HORMONE: CPT

## 2020-06-16 PROCEDURE — 85025 COMPLETE CBC W/AUTO DIFF WBC: CPT

## 2020-06-16 PROCEDURE — 80061 LIPID PANEL: CPT

## 2020-06-16 PROCEDURE — 80053 COMPREHEN METABOLIC PANEL: CPT

## 2020-06-16 PROCEDURE — 83036 HEMOGLOBIN GLYCOSYLATED A1C: CPT

## 2020-06-19 ENCOUNTER — HOSPITAL ENCOUNTER (OUTPATIENT)
Dept: RADIOLOGY | Facility: HOSPITAL | Age: 50
Discharge: HOME | End: 2020-06-19
Attending: INTERNAL MEDICINE
Payer: COMMERCIAL

## 2020-06-19 ENCOUNTER — CLINICAL SUPPORT (OUTPATIENT)
Dept: INTERNAL MEDICINE | Facility: CLINIC | Age: 50
End: 2020-06-19
Payer: COMMERCIAL

## 2020-06-19 DIAGNOSIS — R10.10 UPPER ABDOMINAL PAIN: ICD-10-CM

## 2020-06-19 DIAGNOSIS — J30.1 NON-SEASONAL ALLERGIC RHINITIS DUE TO POLLEN: ICD-10-CM

## 2020-06-19 PROCEDURE — 99499 UNLISTED E&M SERVICE: CPT | Mod: S$GLB,,, | Performed by: ALLERGY & IMMUNOLOGY

## 2020-06-19 PROCEDURE — 95117 IMMUNOTHERAPY INJECTIONS: CPT | Mod: S$GLB,,, | Performed by: INTERNAL MEDICINE

## 2020-06-19 PROCEDURE — 95117 PR IMMU2THERAPY, 2+ INJECTIONS: ICD-10-PCS | Mod: S$GLB,,, | Performed by: INTERNAL MEDICINE

## 2020-06-19 PROCEDURE — 76700 US EXAM ABDOM COMPLETE: CPT | Mod: 26,,, | Performed by: RADIOLOGY

## 2020-06-19 PROCEDURE — 99499 NO LOS: ICD-10-PCS | Mod: S$GLB,,, | Performed by: ALLERGY & IMMUNOLOGY

## 2020-06-19 PROCEDURE — 76700 US EXAM ABDOM COMPLETE: CPT | Mod: TC

## 2020-06-19 PROCEDURE — 76700 US ABDOMEN COMPLETE: ICD-10-PCS | Mod: 26,,, | Performed by: RADIOLOGY

## 2020-06-26 ENCOUNTER — LAB VISIT (OUTPATIENT)
Dept: PRIMARY CARE CLINIC | Facility: OTHER | Age: 50
End: 2020-06-26
Payer: COMMERCIAL

## 2020-06-26 DIAGNOSIS — Z03.818 ENCOUNTER FOR OBSERVATION FOR SUSPECTED EXPOSURE TO OTHER BIOLOGICAL AGENTS RULED OUT: ICD-10-CM

## 2020-06-26 PROCEDURE — U0003 INFECTIOUS AGENT DETECTION BY NUCLEIC ACID (DNA OR RNA); SEVERE ACUTE RESPIRATORY SYNDROME CORONAVIRUS 2 (SARS-COV-2) (CORONAVIRUS DISEASE [COVID-19]), AMPLIFIED PROBE TECHNIQUE, MAKING USE OF HIGH THROUGHPUT TECHNOLOGIES AS DESCRIBED BY CMS-2020-01-R: HCPCS

## 2020-07-03 LAB — SARS-COV-2 RNA RESP QL NAA+PROBE: NEGATIVE

## 2020-07-13 ENCOUNTER — CLINICAL SUPPORT (OUTPATIENT)
Dept: INTERNAL MEDICINE | Facility: CLINIC | Age: 50
End: 2020-07-13
Payer: COMMERCIAL

## 2020-07-13 DIAGNOSIS — J30.9 CHRONIC ALLERGIC RHINITIS: ICD-10-CM

## 2020-07-13 PROCEDURE — 99499 UNLISTED E&M SERVICE: CPT | Mod: S$GLB,,, | Performed by: ALLERGY & IMMUNOLOGY

## 2020-07-13 PROCEDURE — 99499 NO LOS: ICD-10-PCS | Mod: S$GLB,,, | Performed by: ALLERGY & IMMUNOLOGY

## 2020-07-13 PROCEDURE — 95117 PR IMMU2THERAPY, 2+ INJECTIONS: ICD-10-PCS | Mod: S$GLB,,, | Performed by: FAMILY MEDICINE

## 2020-07-13 PROCEDURE — 95117 IMMUNOTHERAPY INJECTIONS: CPT | Mod: S$GLB,,, | Performed by: FAMILY MEDICINE

## 2020-07-27 ENCOUNTER — CLINICAL SUPPORT (OUTPATIENT)
Dept: INTERNAL MEDICINE | Facility: CLINIC | Age: 50
End: 2020-07-27
Payer: COMMERCIAL

## 2020-07-27 DIAGNOSIS — J30.9 CHRONIC ALLERGIC RHINITIS: ICD-10-CM

## 2020-07-27 PROCEDURE — 95117 IMMUNOTHERAPY INJECTIONS: CPT | Mod: S$GLB,,, | Performed by: FAMILY MEDICINE

## 2020-07-27 PROCEDURE — 99499 NO LOS: ICD-10-PCS | Mod: S$GLB,,, | Performed by: ALLERGY & IMMUNOLOGY

## 2020-07-27 PROCEDURE — 99499 UNLISTED E&M SERVICE: CPT | Mod: S$GLB,,, | Performed by: ALLERGY & IMMUNOLOGY

## 2020-07-27 PROCEDURE — 95117 PR IMMU2THERAPY, 2+ INJECTIONS: ICD-10-PCS | Mod: S$GLB,,, | Performed by: FAMILY MEDICINE

## 2020-09-10 ENCOUNTER — TELEPHONE (OUTPATIENT)
Dept: ALLERGY | Facility: CLINIC | Age: 50
End: 2020-09-10

## 2020-09-10 NOTE — TELEPHONE ENCOUNTER
----- Message from Allie Kristi sent at 9/10/2020  2:44 PM CDT -----  Regarding: Medication for injection  Contact: [phone]  Pt is scheduled fro injection tomorrow September 11 at 0800.  Pt is calling to make sure her medication is here.  Has spoken to Yeny and Elvira in the past.  Needs to knolw today.  Thanks

## 2020-09-11 ENCOUNTER — CLINICAL SUPPORT (OUTPATIENT)
Dept: INTERNAL MEDICINE | Facility: CLINIC | Age: 50
End: 2020-09-11
Payer: COMMERCIAL

## 2020-09-11 DIAGNOSIS — J30.9 CHRONIC ALLERGIC RHINITIS: ICD-10-CM

## 2020-09-11 PROCEDURE — 99499 UNLISTED E&M SERVICE: CPT | Mod: S$GLB,,, | Performed by: ALLERGY & IMMUNOLOGY

## 2020-09-11 PROCEDURE — 95117 IMMUNOTHERAPY INJECTIONS: CPT | Mod: S$GLB,,, | Performed by: FAMILY MEDICINE

## 2020-09-11 PROCEDURE — 99499 NO LOS: ICD-10-PCS | Mod: S$GLB,,, | Performed by: ALLERGY & IMMUNOLOGY

## 2020-09-11 PROCEDURE — 95117 PR IMMU2THERAPY, 2+ INJECTIONS: ICD-10-PCS | Mod: S$GLB,,, | Performed by: FAMILY MEDICINE

## 2020-09-16 ENCOUNTER — CLINICAL SUPPORT (OUTPATIENT)
Dept: INTERNAL MEDICINE | Facility: CLINIC | Age: 50
End: 2020-09-16
Payer: COMMERCIAL

## 2020-09-16 DIAGNOSIS — J30.9 CHRONIC ALLERGIC RHINITIS: ICD-10-CM

## 2020-09-16 PROCEDURE — 95117 IMMUNOTHERAPY INJECTIONS: CPT | Mod: S$GLB,,, | Performed by: FAMILY MEDICINE

## 2020-09-16 PROCEDURE — 99499 UNLISTED E&M SERVICE: CPT | Mod: S$GLB,,, | Performed by: ALLERGY & IMMUNOLOGY

## 2020-09-16 PROCEDURE — 99499 NO LOS: ICD-10-PCS | Mod: S$GLB,,, | Performed by: ALLERGY & IMMUNOLOGY

## 2020-09-16 PROCEDURE — 95117 PR IMMU2THERAPY, 2+ INJECTIONS: ICD-10-PCS | Mod: S$GLB,,, | Performed by: FAMILY MEDICINE

## 2020-09-16 NOTE — PROGRESS NOTES
See Media Tab for Allergy Injection information.  Patient was given split dose of all three Red 1:1.  0.25mL given SQ in upper right and left arms of 1/3, 2/3 and 3/3.  All tolerated well, no bleeding noted except at 3/3 in upper right arm.  Bandaid applied.  Waited 1/2 hr.  Sites assessed.  Remaining 0.25mL given SQ of Red 1:1  1/3, 2/3, 3/3 in upper right and left arms.  Tolerated well.  Remained in clinic another 1/2 hrs.  All sites assessed, no reactions noted.      Patient can receive 0.5mL of all three vaccines at next injection if she comes w/in the appropriate time limits.

## 2020-10-05 ENCOUNTER — PATIENT MESSAGE (OUTPATIENT)
Dept: ADMINISTRATIVE | Facility: HOSPITAL | Age: 50
End: 2020-10-05

## 2020-10-08 ENCOUNTER — PATIENT OUTREACH (OUTPATIENT)
Dept: ADMINISTRATIVE | Facility: OTHER | Age: 50
End: 2020-10-08

## 2020-10-08 NOTE — PROGRESS NOTES
Care Everywhere:   Immunization: updated  Health Maintenance: updated  Media Review: review for outside colon cancer report   Legacy Review:   Order placed:   Upcoming appts  Colonoscopy case request 6/15/2020

## 2020-10-09 ENCOUNTER — OFFICE VISIT (OUTPATIENT)
Dept: OPTOMETRY | Facility: CLINIC | Age: 50
End: 2020-10-09
Payer: COMMERCIAL

## 2020-10-09 DIAGNOSIS — H52.13 MYOPIC ASTIGMATISM OF BOTH EYES: Primary | ICD-10-CM

## 2020-10-09 DIAGNOSIS — H52.203 MYOPIC ASTIGMATISM OF BOTH EYES: Primary | ICD-10-CM

## 2020-10-09 PROCEDURE — 92015 PR REFRACTION: ICD-10-PCS | Mod: S$GLB,,, | Performed by: OPTOMETRIST

## 2020-10-09 PROCEDURE — 99999 PR PBB SHADOW E&M-EST. PATIENT-LVL III: ICD-10-PCS | Mod: PBBFAC,,, | Performed by: OPTOMETRIST

## 2020-10-09 PROCEDURE — 99999 PR PBB SHADOW E&M-EST. PATIENT-LVL III: CPT | Mod: PBBFAC,,, | Performed by: OPTOMETRIST

## 2020-10-09 PROCEDURE — 92014 COMPRE OPH EXAM EST PT 1/>: CPT | Mod: S$GLB,,, | Performed by: OPTOMETRIST

## 2020-10-09 PROCEDURE — 92015 DETERMINE REFRACTIVE STATE: CPT | Mod: S$GLB,,, | Performed by: OPTOMETRIST

## 2020-10-09 PROCEDURE — 92014 PR EYE EXAM, EST PATIENT,COMPREHESV: ICD-10-PCS | Mod: S$GLB,,, | Performed by: OPTOMETRIST

## 2020-10-13 ENCOUNTER — PATIENT OUTREACH (OUTPATIENT)
Dept: ADMINISTRATIVE | Facility: HOSPITAL | Age: 50
End: 2020-10-13

## 2020-10-13 NOTE — PROGRESS NOTES
Health Maintenance Due   Topic Date Due    Hepatitis C Screening  1970    HIV Screening  11/04/1984    TETANUS VACCINE  11/04/1987    Shingles Vaccine (1 of 2) 11/04/2019    Colorectal Cancer Screening  11/04/2019     [employer] was given to patient on 10/13/2020 8:56 AM   Chart review completed.

## 2020-11-05 ENCOUNTER — OFFICE VISIT (OUTPATIENT)
Dept: PRIMARY CARE CLINIC | Facility: CLINIC | Age: 50
End: 2020-11-05
Payer: COMMERCIAL

## 2020-11-05 ENCOUNTER — OFFICE VISIT (OUTPATIENT)
Dept: PAIN MEDICINE | Facility: CLINIC | Age: 50
End: 2020-11-05
Payer: COMMERCIAL

## 2020-11-05 ENCOUNTER — TELEPHONE (OUTPATIENT)
Dept: PAIN MEDICINE | Facility: CLINIC | Age: 50
End: 2020-11-05

## 2020-11-05 ENCOUNTER — HOSPITAL ENCOUNTER (OUTPATIENT)
Dept: RADIOLOGY | Facility: HOSPITAL | Age: 50
Discharge: HOME | End: 2020-11-05
Attending: NURSE PRACTITIONER
Payer: COMMERCIAL

## 2020-11-05 VITALS
WEIGHT: 102.94 LBS | DIASTOLIC BLOOD PRESSURE: 82 MMHG | SYSTOLIC BLOOD PRESSURE: 118 MMHG | HEART RATE: 82 BPM | BODY MASS INDEX: 19.45 KG/M2

## 2020-11-05 DIAGNOSIS — M50.30 DDD (DEGENERATIVE DISC DISEASE), CERVICAL: Primary | ICD-10-CM

## 2020-11-05 DIAGNOSIS — M62.838 NECK MUSCLE SPASM: ICD-10-CM

## 2020-11-05 DIAGNOSIS — M50.30 DDD (DEGENERATIVE DISC DISEASE), CERVICAL: ICD-10-CM

## 2020-11-05 PROCEDURE — 72050 X-RAY EXAM NECK SPINE 4/5VWS: CPT | Mod: 26,,, | Performed by: RADIOLOGY

## 2020-11-05 PROCEDURE — 99202 PR OFFICE/OUTPT VISIT, NEW, LEVL II, 15-29 MIN: ICD-10-PCS | Mod: 95,,, | Performed by: HOSPITALIST

## 2020-11-05 PROCEDURE — 99999 PR PBB SHADOW E&M-EST. PATIENT-LVL III: CPT | Mod: PBBFAC,,, | Performed by: NURSE PRACTITIONER

## 2020-11-05 PROCEDURE — 99204 PR OFFICE/OUTPT VISIT, NEW, LEVL IV, 45-59 MIN: ICD-10-PCS | Mod: S$GLB,,, | Performed by: NURSE PRACTITIONER

## 2020-11-05 PROCEDURE — 3008F PR BODY MASS INDEX (BMI) DOCUMENTED: ICD-10-PCS | Mod: CPTII,S$GLB,, | Performed by: NURSE PRACTITIONER

## 2020-11-05 PROCEDURE — 3008F BODY MASS INDEX DOCD: CPT | Mod: CPTII,S$GLB,, | Performed by: NURSE PRACTITIONER

## 2020-11-05 PROCEDURE — 72050 X-RAY EXAM NECK SPINE 4/5VWS: CPT | Mod: TC,FY

## 2020-11-05 PROCEDURE — 99204 OFFICE O/P NEW MOD 45 MIN: CPT | Mod: S$GLB,,, | Performed by: NURSE PRACTITIONER

## 2020-11-05 PROCEDURE — 72050 XR CERVICAL SPINE COMPLETE 5 VIEW: ICD-10-PCS | Mod: 26,,, | Performed by: RADIOLOGY

## 2020-11-05 PROCEDURE — 99202 OFFICE O/P NEW SF 15 MIN: CPT | Mod: 95,,, | Performed by: HOSPITALIST

## 2020-11-05 PROCEDURE — 99999 PR PBB SHADOW E&M-EST. PATIENT-LVL III: ICD-10-PCS | Mod: PBBFAC,,, | Performed by: NURSE PRACTITIONER

## 2020-11-05 RX ORDER — DIAZEPAM 2 MG/1
2 TABLET ORAL EVERY 6 HOURS PRN
Qty: 10 TABLET | Refills: 0 | Status: SHIPPED | OUTPATIENT
Start: 2020-11-05 | End: 2021-08-09 | Stop reason: SDUPTHER

## 2020-11-05 RX ORDER — METHYLPREDNISOLONE 4 MG/1
TABLET ORAL
Qty: 21 TABLET | Refills: 0 | Status: SHIPPED | OUTPATIENT
Start: 2020-11-05 | End: 2020-11-26

## 2020-11-05 RX ORDER — TIZANIDINE 4 MG/1
4 TABLET ORAL NIGHTLY PRN
Qty: 30 TABLET | Refills: 2 | Status: SHIPPED | OUTPATIENT
Start: 2020-11-05 | End: 2020-12-05

## 2020-11-05 RX ORDER — DIAZEPAM 2 MG/1
2 TABLET ORAL EVERY 6 HOURS PRN
Qty: 10 TABLET | Refills: 0 | Status: CANCELLED | OUTPATIENT
Start: 2020-11-05

## 2020-11-05 NOTE — PROGRESS NOTES
[facility] New  Patient Evaluation    Chief Complaint  Chief Complaint   Patient presents with    Neck Pain       Last 3 PDI Scores 11/5/2020   Pain Disability Index (PDI) 70       Interval Updates:  11/5/2020  Shae Trotter presents today complaining of left sided neck pain, that started yesterday when she woke up, pain is located along the Left lateral sternocleidomastoidand left lateral trapezius radiating upwards . She states it feels like a muscle spasm. It is worse with movement of her arms especially when reaching with her right arm. She noticed numbness and tingling in her left arm yesterday but this has subsided. . She denies trauma/injury to the area she presented with a OTC pain patch the has not provided relief.     Background from Chart Review      Treatment History  PT/OT: n/a   HEP: none   TENS:  Injections: n/a    Surgery:n/a  Medications:   - NSAIDS: aleve   - MSK Relaxants:  Yes   - TCAs:   - SNRIs:   - Topicals:   - Opioids: n/a   - Anticonvulsants: n/a    Current Pain Medications  1. aleve     Full Medication List    Current Outpatient Medications:     fluticasone propionate (FLONASE) 50 mcg/actuation nasal spray, 1 spray (50 mcg total) by Each Nare route once daily., Disp: 16 g, Rfl: 2    levocetirizine (XYZAL) 5 MG tablet, Take 1 tablet (5 mg total) by mouth every evening., Disp: 90 tablet, Rfl: 3    EPINEPHrine (EPIPEN) 0.3 mg/0.3 mL AtIn, Inject 0.3 mLs (0.3 mg total) into the muscle once. for 1 dose, Disp: 2 each, Rfl: 2    fluocinonide 0.05% (LIDEX) 0.05 % cream, apply to affected area  of back twice daily as needed for  itching. (Patient not taking: Reported on 11/5/2020), Disp: 60 g, Rfl: 3    Current Facility-Administered Medications:     Allergy Mix, , Subcutaneous, 1 time in Clinic/HOD, Sunny Mujica MD    Allergy Mix, , Subcutaneous, 1 time in Clinic/HOD, Sunny Mujica MD     Review of Systems  Review of Systems   Constitutional: Negative.    HENT:  Negative.    Eyes: Negative.    Cardiovascular: Negative.    Gastrointestinal: Negative.    Genitourinary: Negative.    Musculoskeletal: Positive for myalgias and neck pain.   Skin: Negative.    Neurological: Negative.    Endo/Heme/Allergies: Negative.    Psychiatric/Behavioral: Negative.        Medical History  Past Medical History:   Diagnosis Date    Allergy     taking allx shots since 2006    Breast cancer 2006    breast cancer - tx with mastectomy, chemo - followed by Sunny Basurto    Genetic testing 11/2014    BRCA2 deleterious mutation    Other screening mammogram 6/24/2015    Seasonal allergies         Surgical History  Past Surgical History:   Procedure Laterality Date    COSMETIC SURGERY      right mastectomy after genetic testing returned    D&C hysterscope.      HYSTERECTOMY  4/13/15    Robotic LH/BSO     Left mastectomy and chemo Left 2006    TRAM flap reconstruction     MASTECTOMY Left 2006    [place] REMOVAL OF OVARY/TUBE(S)      Right breast cysts removed      tram flap  Left 2006    breast reconstruction.         Physical Exam  Vitals:    11/05/20 1053   BP: 118/82   Pulse: 82   Weight: 46.7 kg (102 lb 15.3 oz)   PainSc: 10-Worst pain ever         General Musculoskeletal Exam   Gait: normal     Back (L-Spine & T-Spine) / Neck (C-Spine) Exam     Tenderness Left paramedian tenderness of the Upper C-Spine and Lower C-Spine.     Neck (C-Spine) Range of Motion   Left Lateral Bend: abnormal  Left Rotation: abnormal    Spinal Sensation   Right Side Sensation  C-Spine Level: normal   Left Side Sensation  C-Spine Level: hypersensitive      Muscle Strength   Right Upper Extremity   Biceps: 5/5   Triceps:  5/5  Wrist flexion: 5/5   Left Upper Extremity  Biceps: 5/5   Triceps:  5/5  Wrist flexion: 5/5     Reflexes     Left Side  Biceps:  2+  Triceps:  2+    Right Side   Biceps:  2+  Triceps:  2+      Imaging  N/A     Labs:  BMP  Lab Results   Component Value Date     06/16/2020    K 4.2 06/16/2020      06/16/2020    CO2 27 06/16/2020    BUN 15 06/16/2020    CREATININE 0.8 06/16/2020    CALCIUM 10.4 06/16/2020    ANIONGAP 8 06/16/2020    ESTGFRAFRICA >60.0 06/16/2020    EGFRNONAA >60.0 06/16/2020     Lab Results   Component Value Date    ALT 8 (L) 06/16/2020    AST 12 06/16/2020    ALKPHOS 77 06/16/2020    BILITOT 0.3 06/16/2020       Assessment:  Problem List Items Addressed This Visit     None      Visit Diagnoses     DDD (degenerative disc disease), cervical    -  Primary    Relevant Medications    tiZANidine (ZANAFLEX) 4 MG tablet    methylPREDNISolone (MEDROL DOSEPACK) 4 mg tablet    Other Relevant Orders    X-Ray Cervical Spine Complete 5 view    Neck muscle spasm        Relevant Medications    tiZANidine (ZANAFLEX) 4 MG tablet    methylPREDNISolone (MEDROL DOSEPACK) 4 mg tablet    Other Relevant Orders    X-Ray Cervical Spine Complete 5 view          11/5/2020-  Shae Oralia Trotter is a 50 y.o. female with acute left-sided neck pain located along the left lateral sternocleidomastoid and left lateral trapezius radiating upwards, Her pain appears to be secondary to a muscular skeletal strain discussed with her that I recommend cervical x-ray today to rule out pathology(facet joint pain, DDD), we will also recommend a Medrol Dosepak and a muscle relaxer for spasms at night.  Discussed with patient in the future we could consider trigger point injections if pain persists.  Discussed with patient she can contact me via her [facility] in 1-3 days letting me know the effectiveness of medications.       Plan & Recommendations  Procedures:  None at this time, consider trigger point injections in the future.  Medications:  Start tizanidine 4 mg q.h.s. only    Start Medrol Dosepak today  Imaging:  X-ray cervical spine complete 5 view.   PT/OT: consider once pain is under better control   HEP: I encouraged the patient to maintain a home exercise regimen that includes daily, moderate cardiovascular  exercise lasting at least 30 minutes.  This may include yoga, dax chi, walking, swimming, aqua aerobics, or other exercises that maintain a heart rate of 50-70% of the calculated maximum heart rate.  I also encouraged light, daily stretching focused on the target area.  Follow Up: Patient to notify me  In 1-3 days regarding her neck pain.     Dom Hernandez, ORTIZ-C  Interventional Pain Management      Disclaimer: This note was partly generated using dictation software which may occasionally result in transcription errors.

## 2020-11-05 NOTE — TELEPHONE ENCOUNTER
Left message regarding cervical x-rays that were taken today explained in detail over voice mail results patient instructed during our clinic visit today to follow up with me in 1-3 days letting me know the level of her pain.

## 2020-11-05 NOTE — PROGRESS NOTES
[facility]   New Visit Progress Note      PRESENTING HISTORY     Chief Complaint/Reason for Admission:  Follow up Hospital Discharge   PCP: Dalila Phoenix MD    Today: Ms Trotter is a 50 F with no related PMH who presents with acute pain and decreased ROM in her neck. Pain began yesterday. 10/10.  She has never had this pain before.  No injury or inciting event. Feels this is likely due to a muscle spasm as it feels very tight.  ROM is limited due to pain, not weakness.     Review of Systems  General ROS: negative for chills, fever or weight loss  Psychological ROS: negative for hallucination, depression or suicidal ideation  Ophthalmic ROS: negative for blurry vision, photophobia or eye pain  ENT ROS: negative for epistaxis, sore throat or rhinorrhea  Respiratory ROS: no cough, shortness of breath, or wheezing  Cardiovascular ROS: no chest pain or dyspnea on exertion  Gastrointestinal ROS: no abdominal pain, change in bowel habits, or black/ bloody stools  Genito-Urinary ROS: no dysuria, trouble voiding, or hematuria  Musculoskeletal ROS: negative for gait disturbance or muscular weakness - very stiff neck with severe pain.   Neurological ROS: no syncope or seizures; no ataxia  Dermatological ROS: negative for pruritis, rash and jaundice    PAST HISTORY:     Past Medical History:   Diagnosis Date    Allergy     taking allx shots since 2006    Breast cancer 2006    breast cancer - tx with mastectomy, chemo - followed by Sunny Basurto    Genetic testing 11/2014    BRCA2 deleterious mutation    Other screening mammogram 6/24/2015    Seasonal allergies        Past Surgical History:   Procedure Laterality Date    COSMETIC SURGERY      right mastectomy after genetic testing returned    D&C hysterscope.      HYSTERECTOMY  4/13/15    Robotic LH/BSO     Left mastectomy and chemo Left 2006    TRAM flap reconstruction     MASTECTOMY Left 2006    [place] REMOVAL OF OVARY/TUBE(S)      Right breast cysts removed       tram flap  Left 2006    breast reconstruction.        Family History   Problem Relation Age of Onset    Hyperlipidemia Mother     Arrhythmia Mother         on Digoxin    Cataracts Mother     Diabetes Father     Hyperlipidemia Father     Heart disease Father 64         2/2 to MI    No Known Problems Brother     No Known Problems Daughter     Allergic rhinitis Son     No Known Problems Brother     Ovarian cancer Neg Hx     Uterine cancer Neg Hx     Breast cancer Neg Hx     Colon cancer Neg Hx     Amblyopia Neg Hx     Blindness Neg Hx     Cancer Neg Hx     Glaucoma Neg Hx     Hypertension Neg Hx     Retinal detachment Neg Hx     Strabismus Neg Hx     Macular degeneration Neg Hx     Stroke Neg Hx     Thyroid disease Neg Hx     Melanoma Neg Hx          MEDICATIONS & ALLERGIES:     Current Outpatient Medications on File Prior to Visit   Medication Sig Dispense Refill    EPINEPHrine (EPIPEN) 0.3 mg/0.3 mL AtIn Inject 0.3 mLs (0.3 mg total) into the muscle once. for 1 dose 2 each 2    fluocinonide 0.05% (LIDEX) 0.05 % cream apply to affected area  of back twice daily as needed for  itching. (Patient not taking: Reported on 2020) 60 g 3    fluticasone propionate (FLONASE) 50 mcg/actuation nasal spray 1 spray (50 mcg total) by Each Nare route once daily. 16 g 2    levocetirizine (XYZAL) 5 MG tablet Take 1 tablet (5 mg total) by mouth every evening. 90 tablet 3    methylPREDNISolone (MEDROL DOSEPACK) 4 mg tablet Take as directed on package 21 tablet 0    tiZANidine (ZANAFLEX) 4 MG tablet Take 1 tablet (4 mg total) by mouth nightly as needed. 30 tablet 2     Current Facility-Administered Medications on File Prior to Visit   Medication Dose Route Frequency Provider Last Rate Last Dose    Allergy Mix   Subcutaneous 1 time in Clinic/HOD Sunny Mujica MD        Allergy Mix   Subcutaneous 1 time in Clinic/HOD Sunny Mujica MD            Review of patient's allergies indicates:    Allergen Reactions    Fish containing products Anaphylaxis    Aspirin      Other reaction(s): Hives    Avocado Hives     Other reaction(s): Unknown    Fruit extracts Hives     Other reaction(s): Unknown  Allergic to most fruits - hives/swelling    Minocycline Other (See Comments)     dizziness    Penicillins Hives       OBJECTIVE:     Vital Signs:  [facility] 07/10/2012   Wt Readings from Last 1 Encounters:   11/05/20 1053 46.7 kg (102 lb 15.3 oz)     There is no height or weight on file to calculate BMI.        Physical Exam:  [facility] 07/10/2012   General appearance: alert, cooperative, no distress  Constitutional:Oriented to person, place, and time  + appears well-developed and well-nourished.   HEENT: Normocephalic, atraumatic, neck symmetrical, no nasal discharge   Integument: Skin color, texture, turgor normal; no rashes; hair distrubution normal  Neurologic: Alert and oriented X 3, normal strength, normal coordination and gait  Psychiatric: no pressured speech; normal affect; no evidence of impaired cognition   Musculoskeletal: neck is very stiff, unable to turn head normally    Laboratory  Lab Results   Component Value Date    WBC 5.28 06/16/2020    HGB 12.8 06/16/2020    HCT 43.3 06/16/2020    MCV 89 06/16/2020     06/16/2020     BMP  Lab Results   Component Value Date     06/16/2020    K 4.2 06/16/2020     06/16/2020    CO2 27 06/16/2020    BUN 15 06/16/2020    CREATININE 0.8 06/16/2020    CALCIUM 10.4 06/16/2020    ANIONGAP 8 06/16/2020    ESTGFRAFRICA >60.0 06/16/2020    EGFRNONAA >60.0 06/16/2020     Lab Results   Component Value Date    ALT 8 (L) 06/16/2020    AST 12 06/16/2020    ALKPHOS 77 06/16/2020    BILITOT 0.3 06/16/2020     Lab Results   Component Value Date    INR 1.0 10/03/2016    INR 1.0 12/16/2015     Lab Results   Component Value Date    HGBA1C 5.3 06/16/2020       ASSESSMENT & PLAN:     Neck Muscle Spasm  - agree with plan per pain management.  Continue Medrol dose pack and  muscle relaxers as prescribed.       Scheduled Follow-up :  No future appointments.    Post Visit Medication List:     Medication List          Accurate as of November 5, 2020  1:40 PM. If you have any questions, ask your nurse or doctor.            START taking these medications    methylPREDNISolone 4 mg tablet  Commonly known as: MEDROL DOSEPACK  Take as directed on package  Started by: RON Alcantara     tiZANidine 4 MG tablet  Commonly known as: ZANAFLEX  Take 1 tablet (4 mg total) by mouth nightly as needed.  Started by: RON Alcantara        CONTINUE taking these medications    EPINEPHrine 0.3 mg/0.3 mL Atin  Commonly known as: EPIPEN  Inject 0.3 mLs (0.3 mg total) into the muscle once. for 1 dose     fluocinonide 0.05% 0.05 % cream  Commonly known as: LIDEX  apply to affected area  of back twice daily as needed for  itching.     fluticasone propionate 50 mcg/actuation nasal spray  Commonly known as: FLONASE  1 spray (50 mcg total) by Each Nare route once daily.     levocetirizine 5 MG tablet  Commonly known as: XYZAL  Take 1 tablet (5 mg total) by mouth every evening.           Where to Get Your Medications      These medications were sent to [facility]  [address], [place] [address]    Hours: Mon-Fri, 8a-5:30p Phone: [phone]   · methylPREDNISolone 4 mg tablet  · tiZANidine 4 MG tablet         Signing Physician:  Sarah Rosado MD

## 2020-11-09 ENCOUNTER — PATIENT MESSAGE (OUTPATIENT)
Dept: PAIN MEDICINE | Facility: CLINIC | Age: 50
End: 2020-11-09

## 2020-11-11 ENCOUNTER — CLINICAL SUPPORT (OUTPATIENT)
Dept: INTERNAL MEDICINE | Facility: CLINIC | Age: 50
End: 2020-11-11
Payer: COMMERCIAL

## 2020-11-11 DIAGNOSIS — J30.9 CHRONIC ALLERGIC RHINITIS: ICD-10-CM

## 2020-11-11 PROCEDURE — 95117 PR IMMU2THERAPY, 2+ INJECTIONS: ICD-10-PCS | Mod: S$GLB,,, | Performed by: FAMILY MEDICINE

## 2020-11-11 PROCEDURE — 95117 IMMUNOTHERAPY INJECTIONS: CPT | Mod: S$GLB,,, | Performed by: FAMILY MEDICINE

## 2020-11-11 PROCEDURE — 99499 NO LOS: ICD-10-PCS | Mod: S$GLB,,, | Performed by: ALLERGY & IMMUNOLOGY

## 2020-11-11 PROCEDURE — 99499 UNLISTED E&M SERVICE: CPT | Mod: S$GLB,,, | Performed by: ALLERGY & IMMUNOLOGY

## 2020-11-11 NOTE — PROGRESS NOTES
See Media Tab for Allergy Injection information.  Patient missed 57 days since last injection of 0.5mL of all three vaccines.  She had to be backed up 3 doses according to protocol.  Patient received 0.35mL of all three vaccines.  At next injection, if she wishes, she can receive a split dose of 0.25ml, wait 1/2 hr  and 0.25mL and wait 1/2 hr,  of all three vaccines to get her back up to her maintenance dose of 0.5mL of all three vaccines.

## 2020-12-02 ENCOUNTER — CLINICAL SUPPORT (OUTPATIENT)
Dept: INTERNAL MEDICINE | Facility: CLINIC | Age: 50
End: 2020-12-02
Payer: COMMERCIAL

## 2020-12-02 DIAGNOSIS — J30.9 CHRONIC ALLERGIC RHINITIS: ICD-10-CM

## 2020-12-02 PROCEDURE — 99499 NO LOS: ICD-10-PCS | Mod: S$GLB,,, | Performed by: ALLERGY & IMMUNOLOGY

## 2020-12-02 PROCEDURE — 99499 UNLISTED E&M SERVICE: CPT | Mod: S$GLB,,, | Performed by: ALLERGY & IMMUNOLOGY

## 2020-12-02 PROCEDURE — 95117 IMMUNOTHERAPY INJECTIONS: CPT | Mod: S$GLB,,, | Performed by: FAMILY MEDICINE

## 2020-12-02 PROCEDURE — 95117 PR IMMU2THERAPY, 2+ INJECTIONS: ICD-10-PCS | Mod: S$GLB,,, | Performed by: FAMILY MEDICINE

## 2020-12-09 ENCOUNTER — CLINICAL SUPPORT (OUTPATIENT)
Dept: INTERNAL MEDICINE | Facility: CLINIC | Age: 50
End: 2020-12-09
Payer: COMMERCIAL

## 2020-12-09 DIAGNOSIS — J30.9 CHRONIC ALLERGIC RHINITIS: ICD-10-CM

## 2020-12-09 PROCEDURE — 95117 IMMUNOTHERAPY INJECTIONS: CPT | Mod: S$GLB,,, | Performed by: FAMILY MEDICINE

## 2020-12-09 PROCEDURE — 95117 PR IMMU2THERAPY, 2+ INJECTIONS: ICD-10-PCS | Mod: S$GLB,,, | Performed by: FAMILY MEDICINE

## 2020-12-09 PROCEDURE — 99499 NO LOS: ICD-10-PCS | Mod: S$GLB,,, | Performed by: ALLERGY & IMMUNOLOGY

## 2020-12-09 PROCEDURE — 99499 UNLISTED E&M SERVICE: CPT | Mod: S$GLB,,, | Performed by: ALLERGY & IMMUNOLOGY

## 2020-12-21 ENCOUNTER — IMMUNIZATION (OUTPATIENT)
Dept: INTERNAL MEDICINE | Facility: CLINIC | Age: 50
End: 2020-12-21
Payer: COMMERCIAL

## 2020-12-21 DIAGNOSIS — Z23 NEED FOR VACCINATION: ICD-10-CM

## 2020-12-21 PROCEDURE — 0001A COVID-19, MRNA, LNP-S, PF, 30 MCG/0.3 ML DOSE VACCINE: CPT | Mod: CV19,,, | Performed by: FAMILY MEDICINE

## 2020-12-21 PROCEDURE — 91300 COVID-19, MRNA, LNP-S, PF, 30 MCG/0.3 ML DOSE VACCINE: ICD-10-PCS | Mod: ,,, | Performed by: FAMILY MEDICINE

## 2020-12-21 PROCEDURE — 0001A COVID-19, MRNA, LNP-S, PF, 30 MCG/0.3 ML DOSE VACCINE: ICD-10-PCS | Mod: CV19,,, | Performed by: FAMILY MEDICINE

## 2020-12-21 PROCEDURE — 91300 COVID-19, MRNA, LNP-S, PF, 30 MCG/0.3 ML DOSE VACCINE: CPT | Mod: ,,, | Performed by: FAMILY MEDICINE

## 2021-01-04 ENCOUNTER — PATIENT MESSAGE (OUTPATIENT)
Dept: ADMINISTRATIVE | Facility: HOSPITAL | Age: 51
End: 2021-01-04

## 2021-01-11 ENCOUNTER — IMMUNIZATION (OUTPATIENT)
Dept: INTERNAL MEDICINE | Facility: CLINIC | Age: 51
End: 2021-01-11
Payer: COMMERCIAL

## 2021-01-11 DIAGNOSIS — Z23 NEED FOR VACCINATION: ICD-10-CM

## 2021-01-11 PROCEDURE — 91300 COVID-19, MRNA, LNP-S, PF, 30 MCG/0.3 ML DOSE VACCINE: CPT | Mod: PBBFAC | Performed by: FAMILY MEDICINE

## 2021-01-11 PROCEDURE — 0002A COVID-19, MRNA, LNP-S, PF, 30 MCG/0.3 ML DOSE VACCINE: CPT | Mod: PBBFAC | Performed by: FAMILY MEDICINE

## 2021-01-13 ENCOUNTER — CLINICAL SUPPORT (OUTPATIENT)
Dept: INTERNAL MEDICINE | Facility: CLINIC | Age: 51
End: 2021-01-13
Payer: COMMERCIAL

## 2021-01-13 DIAGNOSIS — J30.9 CHRONIC ALLERGIC RHINITIS: ICD-10-CM

## 2021-01-13 PROCEDURE — 99499 UNLISTED E&M SERVICE: CPT | Mod: S$GLB,,, | Performed by: ALLERGY & IMMUNOLOGY

## 2021-01-13 PROCEDURE — 95117 PR IMMU2THERAPY, 2+ INJECTIONS: ICD-10-PCS | Mod: S$GLB,,, | Performed by: FAMILY MEDICINE

## 2021-01-13 PROCEDURE — 99499 NO LOS: ICD-10-PCS | Mod: S$GLB,,, | Performed by: ALLERGY & IMMUNOLOGY

## 2021-01-13 PROCEDURE — 95117 IMMUNOTHERAPY INJECTIONS: CPT | Mod: S$GLB,,, | Performed by: FAMILY MEDICINE

## 2021-01-15 ENCOUNTER — CLINICAL SUPPORT (OUTPATIENT)
Dept: INTERNAL MEDICINE | Facility: CLINIC | Age: 51
End: 2021-01-15
Payer: COMMERCIAL

## 2021-01-15 DIAGNOSIS — J30.9 CHRONIC ALLERGIC RHINITIS: ICD-10-CM

## 2021-01-15 PROCEDURE — 99499 NO LOS: ICD-10-PCS | Mod: S$GLB,,, | Performed by: ALLERGY & IMMUNOLOGY

## 2021-01-15 PROCEDURE — 95117 IMMUNOTHERAPY INJECTIONS: CPT | Mod: S$GLB,,, | Performed by: FAMILY MEDICINE

## 2021-01-15 PROCEDURE — 95117 PR IMMU2THERAPY, 2+ INJECTIONS: ICD-10-PCS | Mod: S$GLB,,, | Performed by: FAMILY MEDICINE

## 2021-01-15 PROCEDURE — 99499 UNLISTED E&M SERVICE: CPT | Mod: S$GLB,,, | Performed by: ALLERGY & IMMUNOLOGY

## 2021-01-27 ENCOUNTER — CLINICAL SUPPORT (OUTPATIENT)
Dept: INTERNAL MEDICINE | Facility: CLINIC | Age: 51
End: 2021-01-27
Payer: COMMERCIAL

## 2021-01-27 DIAGNOSIS — J30.1 NON-SEASONAL ALLERGIC RHINITIS DUE TO POLLEN: ICD-10-CM

## 2021-01-27 PROCEDURE — 99999 PR PBB SHADOW E&M-EST. PATIENT-LVL I: ICD-10-PCS | Mod: PBBFAC,,,

## 2021-01-27 PROCEDURE — 99499 NO LOS: ICD-10-PCS | Mod: S$GLB,,, | Performed by: ALLERGY & IMMUNOLOGY

## 2021-01-27 PROCEDURE — 95117 IMMUNOTHERAPY INJECTIONS: CPT | Mod: S$GLB,,, | Performed by: FAMILY MEDICINE

## 2021-01-27 PROCEDURE — 95117 PR IMMU2THERAPY, 2+ INJECTIONS: ICD-10-PCS | Mod: S$GLB,,, | Performed by: FAMILY MEDICINE

## 2021-01-27 PROCEDURE — 99999 PR PBB SHADOW E&M-EST. PATIENT-LVL I: CPT | Mod: PBBFAC,,,

## 2021-01-27 PROCEDURE — 99499 UNLISTED E&M SERVICE: CPT | Mod: S$GLB,,, | Performed by: ALLERGY & IMMUNOLOGY

## 2021-02-05 ENCOUNTER — CLINICAL SUPPORT (OUTPATIENT)
Dept: INTERNAL MEDICINE | Facility: CLINIC | Age: 51
End: 2021-02-05
Payer: COMMERCIAL

## 2021-02-05 DIAGNOSIS — J30.9 CHRONIC ALLERGIC RHINITIS: ICD-10-CM

## 2021-02-05 PROCEDURE — 99499 UNLISTED E&M SERVICE: CPT | Mod: S$GLB,,, | Performed by: ALLERGY & IMMUNOLOGY

## 2021-02-05 PROCEDURE — 95117 PR IMMU2THERAPY, 2+ INJECTIONS: ICD-10-PCS | Mod: S$GLB,,, | Performed by: FAMILY MEDICINE

## 2021-02-05 PROCEDURE — 95117 IMMUNOTHERAPY INJECTIONS: CPT | Mod: S$GLB,,, | Performed by: FAMILY MEDICINE

## 2021-02-05 PROCEDURE — 99499 NO LOS: ICD-10-PCS | Mod: S$GLB,,, | Performed by: ALLERGY & IMMUNOLOGY

## 2021-02-10 ENCOUNTER — CLINICAL SUPPORT (OUTPATIENT)
Dept: INTERNAL MEDICINE | Facility: CLINIC | Age: 51
End: 2021-02-10
Payer: COMMERCIAL

## 2021-02-10 DIAGNOSIS — J30.1 NON-SEASONAL ALLERGIC RHINITIS DUE TO POLLEN: ICD-10-CM

## 2021-02-10 PROCEDURE — 95117 PR IMMU2THERAPY, 2+ INJECTIONS: ICD-10-PCS | Mod: S$GLB,,, | Performed by: FAMILY MEDICINE

## 2021-02-10 PROCEDURE — 95117 IMMUNOTHERAPY INJECTIONS: CPT | Mod: S$GLB,,, | Performed by: FAMILY MEDICINE

## 2021-02-10 PROCEDURE — 99499 NO LOS: ICD-10-PCS | Mod: S$GLB,,, | Performed by: ALLERGY & IMMUNOLOGY

## 2021-02-10 PROCEDURE — 99499 UNLISTED E&M SERVICE: CPT | Mod: S$GLB,,, | Performed by: ALLERGY & IMMUNOLOGY

## 2021-02-24 ENCOUNTER — CLINICAL SUPPORT (OUTPATIENT)
Dept: INTERNAL MEDICINE | Facility: CLINIC | Age: 51
End: 2021-02-24
Payer: COMMERCIAL

## 2021-02-24 DIAGNOSIS — J30.9 CHRONIC ALLERGIC RHINITIS: ICD-10-CM

## 2021-02-24 PROCEDURE — 99499 UNLISTED E&M SERVICE: CPT | Mod: S$GLB,,, | Performed by: ALLERGY & IMMUNOLOGY

## 2021-02-24 PROCEDURE — 99499 NO LOS: ICD-10-PCS | Mod: S$GLB,,, | Performed by: ALLERGY & IMMUNOLOGY

## 2021-02-24 PROCEDURE — 95117 PR IMMU2THERAPY, 2+ INJECTIONS: ICD-10-PCS | Mod: S$GLB,,, | Performed by: FAMILY MEDICINE

## 2021-02-24 PROCEDURE — 95117 IMMUNOTHERAPY INJECTIONS: CPT | Mod: S$GLB,,, | Performed by: FAMILY MEDICINE

## 2021-03-15 ENCOUNTER — CLINICAL SUPPORT (OUTPATIENT)
Dept: INTERNAL MEDICINE | Facility: CLINIC | Age: 51
End: 2021-03-15
Payer: COMMERCIAL

## 2021-03-15 DIAGNOSIS — J30.9 CHRONIC ALLERGIC RHINITIS: ICD-10-CM

## 2021-03-15 PROCEDURE — 99499 NO LOS: ICD-10-PCS | Mod: S$GLB,,, | Performed by: ALLERGY & IMMUNOLOGY

## 2021-03-15 PROCEDURE — 95117 IMMUNOTHERAPY INJECTIONS: CPT | Mod: S$GLB,,, | Performed by: FAMILY MEDICINE

## 2021-03-15 PROCEDURE — 99499 UNLISTED E&M SERVICE: CPT | Mod: S$GLB,,, | Performed by: ALLERGY & IMMUNOLOGY

## 2021-03-15 PROCEDURE — 95117 PR IMMU2THERAPY, 2+ INJECTIONS: ICD-10-PCS | Mod: S$GLB,,, | Performed by: FAMILY MEDICINE

## 2021-04-19 ENCOUNTER — CLINICAL SUPPORT (OUTPATIENT)
Dept: INTERNAL MEDICINE | Facility: CLINIC | Age: 51
End: 2021-04-19
Payer: COMMERCIAL

## 2021-04-19 DIAGNOSIS — J30.1 NON-SEASONAL ALLERGIC RHINITIS DUE TO POLLEN: ICD-10-CM

## 2021-04-19 PROCEDURE — 95117 PR IMMU2THERAPY, 2+ INJECTIONS: ICD-10-PCS | Mod: S$GLB,,, | Performed by: FAMILY MEDICINE

## 2021-04-19 PROCEDURE — 99499 UNLISTED E&M SERVICE: CPT | Mod: S$GLB,,, | Performed by: ALLERGY & IMMUNOLOGY

## 2021-04-19 PROCEDURE — 99499 NO LOS: ICD-10-PCS | Mod: S$GLB,,, | Performed by: ALLERGY & IMMUNOLOGY

## 2021-04-19 PROCEDURE — 95117 IMMUNOTHERAPY INJECTIONS: CPT | Mod: S$GLB,,, | Performed by: FAMILY MEDICINE

## 2021-05-10 ENCOUNTER — CLINICAL SUPPORT (OUTPATIENT)
Dept: INTERNAL MEDICINE | Facility: CLINIC | Age: 51
End: 2021-05-10
Payer: COMMERCIAL

## 2021-05-10 DIAGNOSIS — J30.1 NON-SEASONAL ALLERGIC RHINITIS DUE TO POLLEN: ICD-10-CM

## 2021-05-10 PROCEDURE — 95117 IMMUNOTHERAPY INJECTIONS: CPT | Mod: S$GLB,,, | Performed by: FAMILY MEDICINE

## 2021-05-10 PROCEDURE — 95117 PR IMMU2THERAPY, 2+ INJECTIONS: ICD-10-PCS | Mod: S$GLB,,, | Performed by: FAMILY MEDICINE

## 2021-05-10 PROCEDURE — 99499 UNLISTED E&M SERVICE: CPT | Mod: S$GLB,,, | Performed by: FAMILY MEDICINE

## 2021-05-10 PROCEDURE — 99499 NO LOS: ICD-10-PCS | Mod: S$GLB,,, | Performed by: FAMILY MEDICINE

## 2021-05-31 ENCOUNTER — CLINICAL SUPPORT (OUTPATIENT)
Dept: INTERNAL MEDICINE | Facility: CLINIC | Age: 51
End: 2021-05-31
Payer: COMMERCIAL

## 2021-05-31 DIAGNOSIS — J30.1 NON-SEASONAL ALLERGIC RHINITIS DUE TO POLLEN: ICD-10-CM

## 2021-05-31 PROCEDURE — 99499 UNLISTED E&M SERVICE: CPT | Mod: S$GLB,,, | Performed by: STUDENT IN AN ORGANIZED HEALTH CARE EDUCATION/TRAINING PROGRAM

## 2021-05-31 PROCEDURE — 99499 NO LOS: ICD-10-PCS | Mod: S$GLB,,, | Performed by: STUDENT IN AN ORGANIZED HEALTH CARE EDUCATION/TRAINING PROGRAM

## 2021-05-31 PROCEDURE — 95117 PR IMMU2THERAPY, 2+ INJECTIONS: ICD-10-PCS | Mod: S$GLB,,, | Performed by: FAMILY MEDICINE

## 2021-05-31 PROCEDURE — 95117 IMMUNOTHERAPY INJECTIONS: CPT | Mod: S$GLB,,, | Performed by: FAMILY MEDICINE

## 2021-07-06 ENCOUNTER — PATIENT MESSAGE (OUTPATIENT)
Dept: ADMINISTRATIVE | Facility: HOSPITAL | Age: 51
End: 2021-07-06

## 2021-07-07 ENCOUNTER — DOCUMENTATION ONLY (OUTPATIENT)
Dept: HEMATOLOGY/ONCOLOGY | Facility: CLINIC | Age: 51
End: 2021-07-07

## 2021-07-19 ENCOUNTER — CLINICAL SUPPORT (OUTPATIENT)
Dept: INTERNAL MEDICINE | Facility: CLINIC | Age: 51
End: 2021-07-19
Payer: COMMERCIAL

## 2021-07-19 DIAGNOSIS — J30.81 ALLERGY TO DOGS: ICD-10-CM

## 2021-07-19 DIAGNOSIS — J30.1 NON-SEASONAL ALLERGIC RHINITIS DUE TO POLLEN: ICD-10-CM

## 2021-07-19 DIAGNOSIS — J30.81 ALLERGY TO CATS: ICD-10-CM

## 2021-07-19 DIAGNOSIS — J30.1 ALLERGIC RHINITIS DUE TO POLLEN, UNSPECIFIED SEASONALITY: Primary | ICD-10-CM

## 2021-07-19 PROCEDURE — 95117 IMMUNOTHERAPY INJECTIONS: CPT | Mod: S$GLB,,, | Performed by: FAMILY MEDICINE

## 2021-07-19 PROCEDURE — 95117 PR IMMU2THERAPY, 2+ INJECTIONS: ICD-10-PCS | Mod: S$GLB,,, | Performed by: FAMILY MEDICINE

## 2021-07-19 PROCEDURE — 99499 UNLISTED E&M SERVICE: CPT | Mod: S$GLB,,, | Performed by: ALLERGY & IMMUNOLOGY

## 2021-07-19 PROCEDURE — 99499 NO LOS: ICD-10-PCS | Mod: S$GLB,,, | Performed by: ALLERGY & IMMUNOLOGY

## 2021-08-04 ENCOUNTER — CLINICAL SUPPORT (OUTPATIENT)
Dept: INTERNAL MEDICINE | Facility: CLINIC | Age: 51
End: 2021-08-04
Payer: COMMERCIAL

## 2021-08-04 DIAGNOSIS — J30.1 ALLERGIC RHINITIS DUE TO POLLEN, UNSPECIFIED SEASONALITY: Primary | ICD-10-CM

## 2021-08-04 DIAGNOSIS — J30.81 ALLERGY TO CATS: ICD-10-CM

## 2021-08-04 DIAGNOSIS — J30.81 ALLERGY TO DOGS: ICD-10-CM

## 2021-08-04 PROCEDURE — 95117 IMMUNOTHERAPY INJECTIONS: CPT | Mod: S$GLB,,, | Performed by: FAMILY MEDICINE

## 2021-08-04 PROCEDURE — 95117 PR IMMU2THERAPY, 2+ INJECTIONS: ICD-10-PCS | Mod: S$GLB,,, | Performed by: FAMILY MEDICINE

## 2021-08-04 PROCEDURE — 99499 NO LOS: ICD-10-PCS | Mod: S$GLB,,, | Performed by: ALLERGY & IMMUNOLOGY

## 2021-08-04 PROCEDURE — 99499 UNLISTED E&M SERVICE: CPT | Mod: S$GLB,,, | Performed by: ALLERGY & IMMUNOLOGY

## 2021-08-09 RX ORDER — DIAZEPAM 2 MG/1
2 TABLET ORAL EVERY 6 HOURS PRN
Qty: 10 TABLET | Refills: 0 | Status: SHIPPED | OUTPATIENT
Start: 2021-08-09 | End: 2023-03-23

## 2021-08-31 ENCOUNTER — PATIENT OUTREACH (OUTPATIENT)
Dept: ADMINISTRATIVE | Facility: OTHER | Age: 51
End: 2021-08-31

## 2021-09-21 ENCOUNTER — PATIENT MESSAGE (OUTPATIENT)
Dept: INTERNAL MEDICINE | Facility: CLINIC | Age: 51
End: 2021-09-21

## 2021-09-29 ENCOUNTER — IMMUNIZATION (OUTPATIENT)
Dept: INTERNAL MEDICINE | Facility: CLINIC | Age: 51
End: 2021-09-29
Payer: COMMERCIAL

## 2021-09-29 DIAGNOSIS — Z23 NEED FOR VACCINATION: Primary | ICD-10-CM

## 2021-09-29 PROCEDURE — 91300 COVID-19, MRNA, LNP-S, PF, 30 MCG/0.3 ML DOSE VACCINE: CPT | Mod: PBBFAC | Performed by: INTERNAL MEDICINE

## 2021-09-29 PROCEDURE — 0003A COVID-19, MRNA, LNP-S, PF, 30 MCG/0.3 ML DOSE VACCINE: CPT | Mod: CV19,PBBFAC | Performed by: INTERNAL MEDICINE

## 2021-10-04 ENCOUNTER — PATIENT MESSAGE (OUTPATIENT)
Dept: ADMINISTRATIVE | Facility: HOSPITAL | Age: 51
End: 2021-10-04

## 2021-10-14 ENCOUNTER — PATIENT OUTREACH (OUTPATIENT)
Dept: ADMINISTRATIVE | Facility: OTHER | Age: 51
End: 2021-10-14

## 2021-10-14 DIAGNOSIS — Z12.11 ENCOUNTER FOR FIT (FECAL IMMUNOCHEMICAL TEST) SCREENING: Primary | ICD-10-CM

## 2021-10-15 ENCOUNTER — OFFICE VISIT (OUTPATIENT)
Dept: DERMATOLOGY | Facility: CLINIC | Age: 51
End: 2021-10-15
Payer: COMMERCIAL

## 2021-10-15 ENCOUNTER — PATIENT MESSAGE (OUTPATIENT)
Dept: DERMATOLOGY | Facility: CLINIC | Age: 51
End: 2021-10-15

## 2021-10-15 DIAGNOSIS — L24.9 IRRITANT CONTACT DERMATITIS, UNSPECIFIED TRIGGER: Primary | ICD-10-CM

## 2021-10-15 PROCEDURE — 99213 OFFICE O/P EST LOW 20 MIN: CPT | Mod: 95,,, | Performed by: DERMATOLOGY

## 2021-10-15 PROCEDURE — 1160F PR REVIEW ALL MEDS BY PRESCRIBER/CLIN PHARMACIST DOCUMENTED: ICD-10-PCS | Mod: CPTII,95,, | Performed by: DERMATOLOGY

## 2021-10-15 PROCEDURE — 1159F MED LIST DOCD IN RCRD: CPT | Mod: CPTII,95,, | Performed by: DERMATOLOGY

## 2021-10-15 PROCEDURE — 1160F RVW MEDS BY RX/DR IN RCRD: CPT | Mod: CPTII,95,, | Performed by: DERMATOLOGY

## 2021-10-15 PROCEDURE — 99213 PR OFFICE/OUTPT VISIT, EST, LEVL III, 20-29 MIN: ICD-10-PCS | Mod: 95,,, | Performed by: DERMATOLOGY

## 2021-10-15 PROCEDURE — 1159F PR MEDICATION LIST DOCUMENTED IN MEDICAL RECORD: ICD-10-PCS | Mod: CPTII,95,, | Performed by: DERMATOLOGY

## 2021-11-10 ENCOUNTER — OFFICE VISIT (OUTPATIENT)
Dept: ALLERGY | Facility: CLINIC | Age: 51
End: 2021-11-10
Payer: COMMERCIAL

## 2021-11-10 VITALS — OXYGEN SATURATION: 97 % | HEIGHT: 61 IN | HEART RATE: 79 BPM | BODY MASS INDEX: 19.98 KG/M2 | WEIGHT: 105.81 LBS

## 2021-11-10 DIAGNOSIS — Z91.018 FOOD ALLERGY: ICD-10-CM

## 2021-11-10 DIAGNOSIS — L50.8 CHRONIC URTICARIA: ICD-10-CM

## 2021-11-10 DIAGNOSIS — J30.89 SEASONAL ALLERGIC RHINITIS DUE TO OTHER ALLERGIC TRIGGER: Primary | ICD-10-CM

## 2021-11-10 DIAGNOSIS — T78.1XXD POLLEN-FOOD ALLERGY, SUBSEQUENT ENCOUNTER: ICD-10-CM

## 2021-11-10 PROCEDURE — 99999 PR PBB SHADOW E&M-EST. PATIENT-LVL III: ICD-10-PCS | Mod: PBBFAC,,, | Performed by: STUDENT IN AN ORGANIZED HEALTH CARE EDUCATION/TRAINING PROGRAM

## 2021-11-10 PROCEDURE — 99214 OFFICE O/P EST MOD 30 MIN: CPT | Mod: S$PBB,,, | Performed by: STUDENT IN AN ORGANIZED HEALTH CARE EDUCATION/TRAINING PROGRAM

## 2021-11-10 PROCEDURE — 95165 ANTIGEN THERAPY SERVICES: CPT | Mod: PBBFAC | Performed by: STUDENT IN AN ORGANIZED HEALTH CARE EDUCATION/TRAINING PROGRAM

## 2021-11-10 PROCEDURE — 99214 PR OFFICE/OUTPT VISIT, EST, LEVL IV, 30-39 MIN: ICD-10-PCS | Mod: S$PBB,,, | Performed by: STUDENT IN AN ORGANIZED HEALTH CARE EDUCATION/TRAINING PROGRAM

## 2021-11-10 PROCEDURE — 99999 PR PBB SHADOW E&M-EST. PATIENT-LVL III: CPT | Mod: PBBFAC,,, | Performed by: STUDENT IN AN ORGANIZED HEALTH CARE EDUCATION/TRAINING PROGRAM

## 2021-11-10 RX ORDER — AZELASTINE 1 MG/ML
2 SPRAY, METERED NASAL 2 TIMES DAILY
Qty: 90 ML | Refills: 0 | Status: SHIPPED | OUTPATIENT
Start: 2021-11-10 | End: 2023-03-23

## 2021-11-14 ENCOUNTER — PATIENT MESSAGE (OUTPATIENT)
Dept: ALLERGY | Facility: CLINIC | Age: 51
End: 2021-11-14
Payer: COMMERCIAL

## 2021-11-14 DIAGNOSIS — J30.89 SEASONAL ALLERGIC RHINITIS DUE TO OTHER ALLERGIC TRIGGER: ICD-10-CM

## 2021-11-17 RX ORDER — LEVOCETIRIZINE DIHYDROCHLORIDE 5 MG/1
5 TABLET, FILM COATED ORAL NIGHTLY
Qty: 90 TABLET | Refills: 3 | Status: SHIPPED | OUTPATIENT
Start: 2021-11-17 | End: 2023-02-20 | Stop reason: SDUPTHER

## 2021-12-02 ENCOUNTER — PATIENT OUTREACH (OUTPATIENT)
Dept: ADMINISTRATIVE | Facility: OTHER | Age: 51
End: 2021-12-02
Payer: COMMERCIAL

## 2021-12-03 ENCOUNTER — OFFICE VISIT (OUTPATIENT)
Dept: OPTOMETRY | Facility: CLINIC | Age: 51
End: 2021-12-03
Payer: COMMERCIAL

## 2021-12-03 DIAGNOSIS — H52.13 MYOPIA WITH PRESBYOPIA OF BOTH EYES: Primary | ICD-10-CM

## 2021-12-03 DIAGNOSIS — H52.4 MYOPIA WITH PRESBYOPIA OF BOTH EYES: Primary | ICD-10-CM

## 2021-12-03 PROCEDURE — 92015 DETERMINE REFRACTIVE STATE: CPT | Mod: S$GLB,,, | Performed by: OPTOMETRIST

## 2021-12-03 PROCEDURE — 92014 COMPRE OPH EXAM EST PT 1/>: CPT | Mod: S$GLB,,, | Performed by: OPTOMETRIST

## 2021-12-03 PROCEDURE — 92014 PR EYE EXAM, EST PATIENT,COMPREHESV: ICD-10-PCS | Mod: S$GLB,,, | Performed by: OPTOMETRIST

## 2021-12-03 PROCEDURE — 92015 PR REFRACTION: ICD-10-PCS | Mod: S$GLB,,, | Performed by: OPTOMETRIST

## 2021-12-03 PROCEDURE — 99999 PR PBB SHADOW E&M-EST. PATIENT-LVL II: CPT | Mod: PBBFAC,,, | Performed by: OPTOMETRIST

## 2021-12-03 PROCEDURE — 99999 PR PBB SHADOW E&M-EST. PATIENT-LVL II: ICD-10-PCS | Mod: PBBFAC,,, | Performed by: OPTOMETRIST

## 2022-01-03 ENCOUNTER — PATIENT MESSAGE (OUTPATIENT)
Dept: ALLERGY | Facility: CLINIC | Age: 52
End: 2022-01-03
Payer: COMMERCIAL

## 2022-01-18 ENCOUNTER — PATIENT MESSAGE (OUTPATIENT)
Dept: ADMINISTRATIVE | Facility: HOSPITAL | Age: 52
End: 2022-01-18
Payer: COMMERCIAL

## 2022-01-26 PROCEDURE — 95165 PR PROFES SVC,IMMUNOTHER,SINGLE/MULT AGS: ICD-10-PCS | Mod: S$GLB,,, | Performed by: STUDENT IN AN ORGANIZED HEALTH CARE EDUCATION/TRAINING PROGRAM

## 2022-01-26 PROCEDURE — 95165 ANTIGEN THERAPY SERVICES: CPT | Mod: S$GLB,,, | Performed by: STUDENT IN AN ORGANIZED HEALTH CARE EDUCATION/TRAINING PROGRAM

## 2022-02-17 ENCOUNTER — PATIENT MESSAGE (OUTPATIENT)
Dept: ALLERGY | Facility: CLINIC | Age: 52
End: 2022-02-17
Payer: COMMERCIAL

## 2022-03-09 ENCOUNTER — PATIENT MESSAGE (OUTPATIENT)
Dept: INTERNAL MEDICINE | Facility: CLINIC | Age: 52
End: 2022-03-09
Payer: COMMERCIAL

## 2022-03-16 ENCOUNTER — PATIENT MESSAGE (OUTPATIENT)
Dept: ADMINISTRATIVE | Facility: HOSPITAL | Age: 52
End: 2022-03-16
Payer: COMMERCIAL

## 2022-03-23 ENCOUNTER — TELEPHONE (OUTPATIENT)
Dept: ALLERGY | Facility: CLINIC | Age: 52
End: 2022-03-23
Payer: COMMERCIAL

## 2022-03-23 NOTE — TELEPHONE ENCOUNTER
Pt. Has allergy vials mixed on 1/26/22, no treatment plan in XIP has note on box I think from Urszula that Dr. Mujica needs to put in a treatment plan. Please advise, thanks

## 2022-04-25 ENCOUNTER — PATIENT MESSAGE (OUTPATIENT)
Dept: ALLERGY | Facility: CLINIC | Age: 52
End: 2022-04-25
Payer: COMMERCIAL

## 2022-05-03 ENCOUNTER — PATIENT MESSAGE (OUTPATIENT)
Dept: ALLERGY | Facility: CLINIC | Age: 52
End: 2022-05-03
Payer: COMMERCIAL

## 2022-05-10 ENCOUNTER — TELEPHONE (OUTPATIENT)
Dept: ALLERGY | Facility: CLINIC | Age: 52
End: 2022-05-10
Payer: COMMERCIAL

## 2022-05-10 ENCOUNTER — PATIENT MESSAGE (OUTPATIENT)
Dept: ALLERGY | Facility: CLINIC | Age: 52
End: 2022-05-10
Payer: COMMERCIAL

## 2022-05-10 DIAGNOSIS — J30.89 SEASONAL ALLERGIC RHINITIS DUE TO OTHER ALLERGIC TRIGGER: Primary | ICD-10-CM

## 2022-05-10 RX ORDER — FAMOTIDINE 20 MG/1
20 TABLET, FILM COATED ORAL 2 TIMES DAILY
Qty: 6 TABLET | Refills: 0 | Status: SHIPPED | OUTPATIENT
Start: 2022-05-10 | End: 2023-03-23

## 2022-05-10 RX ORDER — PREDNISONE 20 MG/1
20 TABLET ORAL 2 TIMES DAILY
Qty: 6 TABLET | Refills: 0 | Status: SHIPPED | OUTPATIENT
Start: 2022-05-10 | End: 2022-05-14

## 2022-05-10 RX ORDER — MONTELUKAST SODIUM 10 MG/1
10 TABLET ORAL DAILY
Qty: 3 TABLET | Refills: 0 | Status: SHIPPED | OUTPATIENT
Start: 2022-05-10 | End: 2022-05-14

## 2022-05-10 RX ORDER — CETIRIZINE HYDROCHLORIDE 10 MG/1
20 TABLET ORAL 2 TIMES DAILY
Qty: 12 TABLET | Refills: 0 | Status: SHIPPED | OUTPATIENT
Start: 2022-05-10 | End: 2023-04-24

## 2022-05-10 NOTE — TELEPHONE ENCOUNTER
Called regarding upcoming Allergy appointment for next week R[facility]. Will send prescriptions in today

## 2022-05-17 ENCOUNTER — OFFICE VISIT (OUTPATIENT)
Dept: ALLERGY | Facility: CLINIC | Age: 52
End: 2022-05-17
Payer: COMMERCIAL

## 2022-05-17 VITALS
WEIGHT: 107.81 LBS | BODY MASS INDEX: 19.84 KG/M2 | DIASTOLIC BLOOD PRESSURE: 67 MMHG | HEART RATE: 92 BPM | HEIGHT: 62 IN | SYSTOLIC BLOOD PRESSURE: 116 MMHG | OXYGEN SATURATION: 100 %

## 2022-05-17 DIAGNOSIS — J30.89 SEASONAL ALLERGIC RHINITIS DUE TO OTHER ALLERGIC TRIGGER: Primary | ICD-10-CM

## 2022-05-17 PROCEDURE — 99999 PR PBB SHADOW E&M-EST. PATIENT-LVL IV: ICD-10-PCS | Mod: PBBFAC,,, | Performed by: STUDENT IN AN ORGANIZED HEALTH CARE EDUCATION/TRAINING PROGRAM

## 2022-05-17 PROCEDURE — 99499 NO LOS: ICD-10-PCS | Mod: S$GLB,,, | Performed by: STUDENT IN AN ORGANIZED HEALTH CARE EDUCATION/TRAINING PROGRAM

## 2022-05-17 PROCEDURE — 95180 PR RAPID DESENSITIZATION PROC,EACH HOUR: ICD-10-PCS | Mod: S$GLB,,, | Performed by: STUDENT IN AN ORGANIZED HEALTH CARE EDUCATION/TRAINING PROGRAM

## 2022-05-17 PROCEDURE — 95180 RAPID DESENSITIZATION: CPT | Mod: S$GLB,,, | Performed by: STUDENT IN AN ORGANIZED HEALTH CARE EDUCATION/TRAINING PROGRAM

## 2022-05-17 PROCEDURE — 99499 UNLISTED E&M SERVICE: CPT | Mod: S$GLB,,, | Performed by: STUDENT IN AN ORGANIZED HEALTH CARE EDUCATION/TRAINING PROGRAM

## 2022-05-17 PROCEDURE — 99999 PR PBB SHADOW E&M-EST. PATIENT-LVL IV: CPT | Mod: PBBFAC,,, | Performed by: STUDENT IN AN ORGANIZED HEALTH CARE EDUCATION/TRAINING PROGRAM

## 2022-05-17 NOTE — PROGRESS NOTES
I have reviewed the notes, assessments, and/or procedures performed by Dr. Gonzalez, I concur with her/his documentation of Shae Trotter.

## 2022-05-17 NOTE — PROGRESS NOTES
"ALLERGY [facility] IMMUNOLOGY CLINIC - R[facility] IT     HISTORY OF PRESENT ILLNESS     Patient ID: Shae Trotter is a 51 y.o. female    CC: allergy shots    HPI: 51 year old female with allergic rhinitis presents for RUSH IT. Has been feeling well, took all pre-medications without issue. Denies fevers, cough, wheezing, shortness of breath, nausea, vomiting and diarrhea     REVIEW OF SYSTEMS     Balance of review of systems negative except as mentioned above     MEDICAL HISTORY     MedHx: active problems reviewed  SurgHx:   Past Surgical History:   Procedure Laterality Date    COSMETIC SURGERY      right mastectomy after genetic testing returned    D&C hysterscope.      HYSTERECTOMY  4/13/15    Robotic LH/BSO     Left mastectomy and chemo Left 2006    TRAM flap reconstruction     MASTECTOMY Left 2006    [place] REMOVAL OF OVARY/TUBE(S)      Right breast cysts removed      tram flap  Left 2006    breast reconstruction.      Allergies: see below  Medications: MAR reviewed       PHYSICAL EXAM     VS: /67 (BP Location: Left arm, Patient Position: Sitting, BP Method: Medium (Automatic))   Pulse 92   Ht 5' 2" (1.575 m)   Wt 48.9 kg (107 lb 12.9 oz)   LMP 07/10/2012   SpO2 100%   BMI 19.72 kg/m²   GENERAL: awake, alert, cooperative with exam  EYES: PERRL, EOMI, no conjunctival injection, no discharge, no infraorbital shiners  LUNGS: CTAB, no w/r/c, no increased WOB  HEART: Normal Rate and regular rhythm, normal S1/S2, no m/g/r  ABDOMEN: Normoactive bowel sounds, soft, non-tender  EXTREMITIES: +2 distal pulses, no c/c/e  DERM: no rashes, no skin breaks     PROCEDURE     [facility] for induction of immunotherapy with three extract vials   Day 1  Vial A Vial B Vial C   Time v/v Color mL Time v/v Color mL Time v/v Color mL   08:40 1:1,000 Green 0.3 08:40 1:1,000 Green 0.3 08:40 1:1,000 Green 0.3   09:05 1:100 Blue 0.1 09:05 1:100 Blue 0.1 09:05 1:100 Blue 0.1   09:35 1:100 Blue 0.3 09:35 1:100 Blue 0.3 " 09:35 1:100 Blue 0.3   10:05 1:10 Yellow 0.05 10:05 1:10 Yellow 0.05 10:05 1:10 Yellow 0.05     After explaining risks and benefits, patient elected to undergo aeroallergen RUSH IT. Tolerated all injections as listed above and monitored for 2 hours following last injection and provided on call pager number       CHART REVIEW   Previous Notes     ASSESSMENT & PLAN     Shae Trotter is a 51 y.o. female with     Seasonal allergic rhinitis due to other allergic trigger- Tolerated RUSH IT today. Plan to follow up in one week for continued build up      Build-up Vial A Vial B Vial C   Injection v/v Color mL v/v Color mL v/v Color mL   1 1:10 Yellow 0.1 1:10 Yellow 0.1 1:10 Yellow 0.1   2 1:10 Yellow 0.15 1:10 Yellow 0.15 1:10 Yellow 0.15   3 1:10 Yellow 0.2 1:10 Yellow 0.2 1:10 Yellow 0.2   4 1:10 Yellow 0.25 1:10 Yellow 0.25 1:10 Yellow 0.25   5 1:10 Yellow 0.3 1:10 Yellow 0.3 1:10 Yellow 0.3   6 1:10 Yellow 0.3 1:10 Yellow 0.35 1:10 Yellow 0.35   7 1:10 Yellow 0.4 1:10 Yellow 0.4 1:10 Yellow 0.4   8 1:10 Yellow 0.3 1:10 Yellow 0.45 1:10 Yellow 0.45   9 1:10 Yellow 0.5 1:10 Yellow 0.5 1:10 Yellow 0.5   10 1:1 Red 0.1 1:1 Red 0.1 1:1 Red 0.1   11 1:1 Red 0.1 1:1 Red 0.15 1:1 Red 0.15   12 1:1 Red 0.2 1:1 Red 0.2 1:1 Red 0.2   13 1:1 Red 0.1 1:1 Red 0.25 1:1 Red 0.25   14 1:1 Red 0.3 1:1 Red 0.3 1:1 Red 0.3   15 1:1 Red 0.1 1:1 Red 0.35 1:1 Red 0.35   16 1:1 Red 0.4 1:1 Red 0.4 1:1 Red 0.4   17 1:1 Red 0.1 1:1 Red 0.45 1:1 Red 0.45   18 1:1 Red 0.5 1:1 Red 0.5 1:1 Red 0.5         Follow up: 1 week      Beau Gonzalez MD  Allergy/Immunology Fellow

## 2022-05-17 NOTE — PATIENT INSTRUCTIONS
Follow up next Tuesday at 08:00am. Make sure to take cetirizine or loratadine 10mg once before arrival    You have successfully made it through rapid desensitization. If you develop a possible concerning reaction later today, you can call our fellow pager number at  [phone] (dial the number, then dial your own phone number after the beep) to speak with one of our colleagues. For your injections going forward, be sure to take an antihistamine prior to the appointments. Your next injection should be in 1 week. Please call to make this appointment.

## 2022-05-24 ENCOUNTER — OFFICE VISIT (OUTPATIENT)
Dept: ALLERGY | Facility: CLINIC | Age: 52
End: 2022-05-24
Payer: COMMERCIAL

## 2022-05-24 DIAGNOSIS — J30.89 SEASONAL ALLERGIC RHINITIS DUE TO OTHER ALLERGIC TRIGGER: Primary | ICD-10-CM

## 2022-05-24 PROCEDURE — 95117 PR IMMU2THERAPY, 2+ INJECTIONS: ICD-10-PCS | Mod: S$GLB,,, | Performed by: STUDENT IN AN ORGANIZED HEALTH CARE EDUCATION/TRAINING PROGRAM

## 2022-05-24 PROCEDURE — 99499 UNLISTED E&M SERVICE: CPT | Mod: S$GLB,,, | Performed by: STUDENT IN AN ORGANIZED HEALTH CARE EDUCATION/TRAINING PROGRAM

## 2022-05-24 PROCEDURE — 95117 IMMUNOTHERAPY INJECTIONS: CPT | Mod: S$GLB,,, | Performed by: STUDENT IN AN ORGANIZED HEALTH CARE EDUCATION/TRAINING PROGRAM

## 2022-05-24 PROCEDURE — 99499 NO LOS: ICD-10-PCS | Mod: S$GLB,,, | Performed by: STUDENT IN AN ORGANIZED HEALTH CARE EDUCATION/TRAINING PROGRAM

## 2022-05-24 NOTE — PROGRESS NOTES
[facility] - Injection Visit     HISTORY OF PRESENT ILLNESS     Patient ID: Shae Trotter is a 51 y.o. female    CC: AIT injection    HPI: Ms. Kaufman is a 51 year old female with a history of allergic rhinitis who presents today for her next dose of AIT. She underwent 3 vial RUSH procedure last week which she tolerated well. Today she denies fever, chills, CP, SOB, rash, nausea, vomiting, and diarrhea. She has not started any new BP medications including beta blockers.      REVIEW OF SYSTEMS     CONST: no F/C/NS, no unintentional weight changes  EYES: Denies itchy/watery eyes, denies injected eyes  EARS: no hearing loss, no sensation of fullness  NOSE: no congestion, no rhinorrhea, no discharge, no itching, no sneezing  PULM: no SOB, no wheezing, no cough, no snoring  CV: no CP, no palpitations, no leg swelling  GI: no dysphagia, no heartburn, no pain, no N/V/D  MSK: no joint pain, no muscle pain  DERM: no rashes, no skin breaks    Balance of review of systems negative except as mentioned above     MEDICAL HISTORY     MedHx: active problems reviewed  SurgHx:   Past Surgical History:   Procedure Laterality Date    COSMETIC SURGERY      right mastectomy after genetic testing returned    D&C hysterscope.      HYSTERECTOMY  4/13/15    Robotic LH/BSO     Left mastectomy and chemo Left 2006    TRAM flap reconstruction     MASTECTOMY Left 2006    [place] REMOVAL OF OVARY/TUBE(S)      Right breast cysts removed      tram flap  Left 2006    breast reconstruction.        Allergies: see below  Medications: MAR reviewed       PHYSICAL EXAM   Physical Exam  Vitals reviewed.   Constitutional:       Appearance: Normal appearance. She is normal weight.   HENT:      Head: Normocephalic and atraumatic.   Eyes:      Extraocular Movements: Extraocular movements intact.      Conjunctiva/sclera: Conjunctivae normal.   Pulmonary:      Effort: Pulmonary effort is normal. No respiratory distress.      Breath  sounds: Normal breath sounds. No wheezing or rales.   Skin:     General: Skin is warm and dry.      Findings: No rash.   Neurological:      General: No focal deficit present.      Mental Status: She is alert and oriented to person, place, and time.   Psychiatric:         Mood and Affect: Mood normal.         Behavior: Behavior normal.         Thought Content: Thought content normal.         Judgment: Judgment normal.        INJECTION GIVEN TODAY     At 09:28 AM, Ms. Trotter received the following injections:    Build-up Vial A Vial B Vial C   Injection v/v Color mL v/v Color mL v/v Color mL   1 1:10 Yellow 0.1 1:10 Yellow 0.1 1:10 Yellow 0.1        ASSESSMENT & PLAN     Shae Trotter is a 51 y.o. female with     Seasonal allergic rhinitis due to other allergic trigger: Today, Ms. Trotter received 0.1 ml of 1:10 v/v yellow from each vial A, B, and C. She was observed for 30 minutes. She tolerated injections with no signs of reaction.     If she returns in one week buildup will be as follows:    Build-up Vial A Vial B Vial C   Injection v/v Color mL v/v Color mL v/v Color mL   2 1:10 Yellow 0.15 1:10 Yellow 0.15 1:10 Yellow 0.15   3 1:10 Yellow 0.2 1:10 Yellow 0.2 1:10 Yellow 0.2   4 1:10 Yellow 0.25 1:10 Yellow 0.25 1:10 Yellow 0.25   5 1:10 Yellow 0.3 1:10 Yellow 0.3 1:10 Yellow 0.3   6 1:10 Yellow 0.3 1:10 Yellow 0.35 1:10 Yellow 0.35   7 1:10 Yellow 0.4 1:10 Yellow 0.4 1:10 Yellow 0.4   8 1:10 Yellow 0.3 1:10 Yellow 0.45 1:10 Yellow 0.45   9 1:10 Yellow 0.5 1:10 Yellow 0.5 1:10 Yellow 0.5   10 1:1 Red 0.1 1:1 Red 0.1 1:1 Red 0.1   11 1:1 Red 0.1 1:1 Red 0.15 1:1 Red 0.15   12 1:1 Red 0.2 1:1 Red 0.2 1:1 Red 0.2   13 1:1 Red 0.1 1:1 Red 0.25 1:1 Red 0.25   14 1:1 Red 0.3 1:1 Red 0.3 1:1 Red 0.3   15 1:1 Red 0.1 1:1 Red 0.35 1:1 Red 0.35   16 1:1 Red 0.4 1:1 Red 0.4 1:1 Red 0.4   17 1:1 Red 0.1 1:1 Red 0.45 1:1 Red 0.45   18 1:1 Red 0.5 1:1 Red 0.5 1:1 Red 0.5         Follow up: 1 week    Patient discussed  with Dr. Mujica and Dr. Carlos Padilla MD  Allergy/Immunology Fellow

## 2022-05-30 ENCOUNTER — PATIENT MESSAGE (OUTPATIENT)
Dept: ADMINISTRATIVE | Facility: HOSPITAL | Age: 52
End: 2022-05-30
Payer: COMMERCIAL

## 2022-05-31 ENCOUNTER — CLINICAL SUPPORT (OUTPATIENT)
Dept: ALLERGY | Facility: CLINIC | Age: 52
End: 2022-05-31
Payer: COMMERCIAL

## 2022-05-31 DIAGNOSIS — J30.9 CHRONIC ALLERGIC RHINITIS: ICD-10-CM

## 2022-05-31 PROCEDURE — 95117 IMMUNOTHERAPY INJECTIONS: CPT | Mod: S$GLB,,, | Performed by: ALLERGY & IMMUNOLOGY

## 2022-05-31 PROCEDURE — 99499 NO LOS: ICD-10-PCS | Mod: S$GLB,,, | Performed by: ALLERGY & IMMUNOLOGY

## 2022-05-31 PROCEDURE — 99499 UNLISTED E&M SERVICE: CPT | Mod: S$GLB,,, | Performed by: ALLERGY & IMMUNOLOGY

## 2022-05-31 PROCEDURE — 95117 PR IMMU2THERAPY, 2+ INJECTIONS: ICD-10-PCS | Mod: S$GLB,,, | Performed by: ALLERGY & IMMUNOLOGY

## 2022-06-14 ENCOUNTER — CLINICAL SUPPORT (OUTPATIENT)
Dept: ALLERGY | Facility: CLINIC | Age: 52
End: 2022-06-14
Payer: COMMERCIAL

## 2022-06-14 DIAGNOSIS — Z91.018 FOOD ALLERGY: ICD-10-CM

## 2022-06-14 DIAGNOSIS — J30.9 CHRONIC ALLERGIC RHINITIS: Primary | ICD-10-CM

## 2022-06-14 PROCEDURE — 95117 PR IMMU2THERAPY, 2+ INJECTIONS: ICD-10-PCS | Mod: S$GLB,,, | Performed by: ALLERGY & IMMUNOLOGY

## 2022-06-14 PROCEDURE — 95117 IMMUNOTHERAPY INJECTIONS: CPT | Mod: S$GLB,,, | Performed by: ALLERGY & IMMUNOLOGY

## 2022-06-14 PROCEDURE — 99999 PR PBB SHADOW E&M-EST. PATIENT-LVL I: ICD-10-PCS | Mod: PBBFAC,,,

## 2022-06-14 PROCEDURE — 99999 PR PBB SHADOW E&M-EST. PATIENT-LVL I: CPT | Mod: PBBFAC,,,

## 2022-06-14 RX ORDER — EPINEPHRINE 0.3 MG/.3ML
1 INJECTION SUBCUTANEOUS ONCE
Qty: 2 EACH | Refills: 2 | Status: SHIPPED | OUTPATIENT
Start: 2022-06-14 | End: 2024-01-23

## 2022-06-14 NOTE — PROGRESS NOTES
The patient presents today for next aeroallergen IT injections (three vials). She also has food allergy and notes that she has  epi autoinjectors.     She is feeling well today. No new medications, no problems with last injections.  Breathing comfortably.     0.25mL of each 1:10v/v (yellow top) vial administered SQ into the upper posterior arms.  The patient was monitored for 30 minutes and then discharged.   A: chronic rhinitis, food allergy  P: continue weekly visits to complete induction phase before spacing to monthly injections for treatment of allergic rhinitis.  Refill EpiPens.

## 2022-06-21 ENCOUNTER — CLINICAL SUPPORT (OUTPATIENT)
Dept: ALLERGY | Facility: CLINIC | Age: 52
End: 2022-06-21
Payer: COMMERCIAL

## 2022-06-21 DIAGNOSIS — J30.1 NON-SEASONAL ALLERGIC RHINITIS DUE TO POLLEN: ICD-10-CM

## 2022-06-21 PROCEDURE — 95117 IMMUNOTHERAPY INJECTIONS: CPT | Mod: S$GLB,,, | Performed by: ALLERGY & IMMUNOLOGY

## 2022-06-21 PROCEDURE — 99499 NO LOS: ICD-10-PCS | Mod: S$GLB,,, | Performed by: ALLERGY & IMMUNOLOGY

## 2022-06-21 PROCEDURE — 95117 PR IMMU2THERAPY, 2+ INJECTIONS: ICD-10-PCS | Mod: S$GLB,,, | Performed by: ALLERGY & IMMUNOLOGY

## 2022-06-21 PROCEDURE — 99499 UNLISTED E&M SERVICE: CPT | Mod: S$GLB,,, | Performed by: ALLERGY & IMMUNOLOGY

## 2022-06-28 ENCOUNTER — CLINICAL SUPPORT (OUTPATIENT)
Dept: ALLERGY | Facility: CLINIC | Age: 52
End: 2022-06-28
Payer: COMMERCIAL

## 2022-06-28 DIAGNOSIS — J30.1 NON-SEASONAL ALLERGIC RHINITIS DUE TO POLLEN: ICD-10-CM

## 2022-06-28 PROCEDURE — 99499 NO LOS: ICD-10-PCS | Mod: S$GLB,,, | Performed by: ALLERGY & IMMUNOLOGY

## 2022-06-28 PROCEDURE — 95117 IMMUNOTHERAPY INJECTIONS: CPT | Mod: S$GLB,,, | Performed by: ALLERGY & IMMUNOLOGY

## 2022-06-28 PROCEDURE — 95117 PR IMMU2THERAPY, 2+ INJECTIONS: ICD-10-PCS | Mod: S$GLB,,, | Performed by: ALLERGY & IMMUNOLOGY

## 2022-06-28 PROCEDURE — 99499 UNLISTED E&M SERVICE: CPT | Mod: S$GLB,,, | Performed by: ALLERGY & IMMUNOLOGY

## 2022-07-11 ENCOUNTER — CLINICAL SUPPORT (OUTPATIENT)
Dept: ALLERGY | Facility: CLINIC | Age: 52
End: 2022-07-11
Payer: COMMERCIAL

## 2022-07-11 DIAGNOSIS — J30.89 SEASONAL ALLERGIC RHINITIS DUE TO OTHER ALLERGIC TRIGGER: ICD-10-CM

## 2022-07-11 PROCEDURE — 95117 PR IMMU2THERAPY, 2+ INJECTIONS: ICD-10-PCS | Mod: S$GLB,,, | Performed by: ALLERGY & IMMUNOLOGY

## 2022-07-11 PROCEDURE — 99499 UNLISTED E&M SERVICE: CPT | Mod: S$GLB,,, | Performed by: ALLERGY & IMMUNOLOGY

## 2022-07-11 PROCEDURE — 99999 PR PBB SHADOW E&M-EST. PATIENT-LVL I: ICD-10-PCS | Mod: PBBFAC,,,

## 2022-07-11 PROCEDURE — 99999 PR PBB SHADOW E&M-EST. PATIENT-LVL I: CPT | Mod: PBBFAC,,,

## 2022-07-11 PROCEDURE — 99499 NO LOS: ICD-10-PCS | Mod: S$GLB,,, | Performed by: ALLERGY & IMMUNOLOGY

## 2022-07-11 PROCEDURE — 95117 IMMUNOTHERAPY INJECTIONS: CPT | Mod: S$GLB,,, | Performed by: ALLERGY & IMMUNOLOGY

## 2022-07-18 ENCOUNTER — CLINICAL SUPPORT (OUTPATIENT)
Dept: ALLERGY | Facility: CLINIC | Age: 52
End: 2022-07-18
Payer: COMMERCIAL

## 2022-07-18 DIAGNOSIS — J30.1 NON-SEASONAL ALLERGIC RHINITIS DUE TO POLLEN: ICD-10-CM

## 2022-07-18 PROCEDURE — 99499 NO LOS: ICD-10-PCS | Mod: S$GLB,,, | Performed by: ALLERGY & IMMUNOLOGY

## 2022-07-18 PROCEDURE — 95125 PR IMMUNO RX,W EXTRACT,2+ INJECTNS: ICD-10-PCS | Mod: S$GLB,,, | Performed by: ALLERGY & IMMUNOLOGY

## 2022-07-18 PROCEDURE — 99499 UNLISTED E&M SERVICE: CPT | Mod: S$GLB,,, | Performed by: ALLERGY & IMMUNOLOGY

## 2022-07-18 PROCEDURE — 95125 IMMUNOTHERAPY 2/> INJECTIONS: CPT | Mod: S$GLB,,, | Performed by: ALLERGY & IMMUNOLOGY

## 2022-07-26 ENCOUNTER — TELEPHONE (OUTPATIENT)
Dept: ALLERGY | Facility: CLINIC | Age: 52
End: 2022-07-26
Payer: COMMERCIAL

## 2022-07-26 NOTE — TELEPHONE ENCOUNTER
----- Message from Niecy Burris sent at 7/26/2022 12:47 PM CDT -----  Contact: [phone]  PT, would like to Scheduled a  Appointment, please contact @ number provided     [phone]      for her weekly Injection

## 2022-08-01 ENCOUNTER — CLINICAL SUPPORT (OUTPATIENT)
Dept: ALLERGY | Facility: CLINIC | Age: 52
End: 2022-08-01
Payer: COMMERCIAL

## 2022-08-01 DIAGNOSIS — J30.1 NON-SEASONAL ALLERGIC RHINITIS DUE TO POLLEN: ICD-10-CM

## 2022-08-01 DIAGNOSIS — J30.89 SEASONAL ALLERGIC RHINITIS DUE TO OTHER ALLERGIC TRIGGER: ICD-10-CM

## 2022-08-01 PROCEDURE — 95117 PR IMMU2THERAPY, 2+ INJECTIONS: ICD-10-PCS | Mod: S$GLB,,, | Performed by: ALLERGY & IMMUNOLOGY

## 2022-08-01 PROCEDURE — 99499 NO LOS: ICD-10-PCS | Mod: S$GLB,,, | Performed by: ALLERGY & IMMUNOLOGY

## 2022-08-01 PROCEDURE — 99499 UNLISTED E&M SERVICE: CPT | Mod: S$GLB,,, | Performed by: ALLERGY & IMMUNOLOGY

## 2022-08-01 PROCEDURE — 95117 IMMUNOTHERAPY INJECTIONS: CPT | Mod: S$GLB,,, | Performed by: ALLERGY & IMMUNOLOGY

## 2022-08-08 ENCOUNTER — CLINICAL SUPPORT (OUTPATIENT)
Dept: ALLERGY | Facility: CLINIC | Age: 52
End: 2022-08-08
Payer: COMMERCIAL

## 2022-08-08 DIAGNOSIS — J30.9 CHRONIC ALLERGIC RHINITIS: ICD-10-CM

## 2022-08-08 PROCEDURE — 99499 UNLISTED E&M SERVICE: CPT | Mod: S$GLB,,, | Performed by: ALLERGY & IMMUNOLOGY

## 2022-08-08 PROCEDURE — 95117 PR IMMU2THERAPY, 2+ INJECTIONS: ICD-10-PCS | Mod: S$GLB,,, | Performed by: ALLERGY & IMMUNOLOGY

## 2022-08-08 PROCEDURE — 99499 NO LOS: ICD-10-PCS | Mod: S$GLB,,, | Performed by: ALLERGY & IMMUNOLOGY

## 2022-08-08 PROCEDURE — 95117 IMMUNOTHERAPY INJECTIONS: CPT | Mod: S$GLB,,, | Performed by: ALLERGY & IMMUNOLOGY

## 2022-08-22 ENCOUNTER — CLINICAL SUPPORT (OUTPATIENT)
Dept: ALLERGY | Facility: CLINIC | Age: 52
End: 2022-08-22
Payer: COMMERCIAL

## 2022-08-22 DIAGNOSIS — J30.89 SEASONAL ALLERGIC RHINITIS DUE TO OTHER ALLERGIC TRIGGER: ICD-10-CM

## 2022-08-22 PROCEDURE — 99499 NO LOS: ICD-10-PCS | Mod: S$GLB,,, | Performed by: ALLERGY & IMMUNOLOGY

## 2022-08-22 PROCEDURE — 95117 PR IMMU2THERAPY, 2+ INJECTIONS: ICD-10-PCS | Mod: S$GLB,,, | Performed by: ALLERGY & IMMUNOLOGY

## 2022-08-22 PROCEDURE — 95117 IMMUNOTHERAPY INJECTIONS: CPT | Mod: S$GLB,,, | Performed by: ALLERGY & IMMUNOLOGY

## 2022-08-22 PROCEDURE — 99499 UNLISTED E&M SERVICE: CPT | Mod: S$GLB,,, | Performed by: ALLERGY & IMMUNOLOGY

## 2022-08-22 NOTE — PROGRESS NOTES
See Media tab for allergy injection administration.    Vial 1 of 3-0.2 ml  given Upper left arm SQ had 2+ wheal and erythema to injection site only.  Vial 2 of 3- 0.2 ml given Upper right arm SQ-no rxn noted  Vial 3 of 3- 0.2ml given Mid left arm SQ- no rxn noted

## 2022-08-29 ENCOUNTER — CLINICAL SUPPORT (OUTPATIENT)
Dept: ALLERGY | Facility: CLINIC | Age: 52
End: 2022-08-29
Payer: COMMERCIAL

## 2022-08-29 PROCEDURE — 99499 NO LOS: ICD-10-PCS | Mod: S$GLB,,, | Performed by: ALLERGY & IMMUNOLOGY

## 2022-08-29 PROCEDURE — 99499 UNLISTED E&M SERVICE: CPT | Mod: S$GLB,,, | Performed by: ALLERGY & IMMUNOLOGY

## 2022-08-29 PROCEDURE — 95117 IMMUNOTHERAPY INJECTIONS: CPT | Mod: S$GLB,,, | Performed by: ALLERGY & IMMUNOLOGY

## 2022-08-29 PROCEDURE — 95117 PR IMMU2THERAPY, 2+ INJECTIONS: ICD-10-PCS | Mod: S$GLB,,, | Performed by: ALLERGY & IMMUNOLOGY

## 2022-09-06 ENCOUNTER — CLINICAL SUPPORT (OUTPATIENT)
Dept: ALLERGY | Facility: CLINIC | Age: 52
End: 2022-09-06
Payer: COMMERCIAL

## 2022-09-06 DIAGNOSIS — J30.9 CHRONIC ALLERGIC RHINITIS: Primary | ICD-10-CM

## 2022-09-06 PROCEDURE — 99999 PR PBB SHADOW E&M-EST. PATIENT-LVL I: CPT | Mod: PBBFAC,,,

## 2022-09-06 PROCEDURE — 99499 NO LOS: ICD-10-PCS | Mod: S$GLB,,, | Performed by: STUDENT IN AN ORGANIZED HEALTH CARE EDUCATION/TRAINING PROGRAM

## 2022-09-06 PROCEDURE — 95115 PR IMMUNOTHERAPY, ONE INJECTION: ICD-10-PCS | Mod: S$GLB,,, | Performed by: ALLERGY & IMMUNOLOGY

## 2022-09-06 PROCEDURE — 99999 PR PBB SHADOW E&M-EST. PATIENT-LVL I: ICD-10-PCS | Mod: PBBFAC,,,

## 2022-09-06 PROCEDURE — 99499 UNLISTED E&M SERVICE: CPT | Mod: S$GLB,,, | Performed by: STUDENT IN AN ORGANIZED HEALTH CARE EDUCATION/TRAINING PROGRAM

## 2022-09-06 PROCEDURE — 95115 IMMUNOTHERAPY ONE INJECTION: CPT | Mod: S$GLB,,, | Performed by: ALLERGY & IMMUNOLOGY

## 2022-09-13 ENCOUNTER — CLINICAL SUPPORT (OUTPATIENT)
Dept: ALLERGY | Facility: CLINIC | Age: 52
End: 2022-09-13
Payer: COMMERCIAL

## 2022-09-13 DIAGNOSIS — J30.9 CHRONIC ALLERGIC RHINITIS: Primary | ICD-10-CM

## 2022-09-13 PROCEDURE — 99999 PR PBB SHADOW E&M-EST. PATIENT-LVL I: CPT | Mod: PBBFAC,,,

## 2022-09-13 PROCEDURE — 95117 IMMUNOTHERAPY INJECTIONS: CPT | Mod: S$GLB,,, | Performed by: ALLERGY & IMMUNOLOGY

## 2022-09-13 PROCEDURE — 99999 PR PBB SHADOW E&M-EST. PATIENT-LVL I: ICD-10-PCS | Mod: PBBFAC,,,

## 2022-09-13 PROCEDURE — 99499 UNLISTED E&M SERVICE: CPT | Mod: S$GLB,,, | Performed by: ALLERGY & IMMUNOLOGY

## 2022-09-13 PROCEDURE — 99499 NO LOS: ICD-10-PCS | Mod: S$GLB,,, | Performed by: ALLERGY & IMMUNOLOGY

## 2022-09-13 PROCEDURE — 95117 PR IMMU2THERAPY, 2+ INJECTIONS: ICD-10-PCS | Mod: S$GLB,,, | Performed by: ALLERGY & IMMUNOLOGY

## 2022-09-27 ENCOUNTER — CLINICAL SUPPORT (OUTPATIENT)
Dept: ALLERGY | Facility: CLINIC | Age: 52
End: 2022-09-27
Payer: COMMERCIAL

## 2022-09-27 DIAGNOSIS — J30.9 CHRONIC ALLERGIC RHINITIS: Primary | ICD-10-CM

## 2022-09-27 PROCEDURE — 99499 UNLISTED E&M SERVICE: CPT | Mod: S$GLB,,, | Performed by: ALLERGY & IMMUNOLOGY

## 2022-09-27 PROCEDURE — 99499 NO LOS: ICD-10-PCS | Mod: S$GLB,,, | Performed by: ALLERGY & IMMUNOLOGY

## 2022-09-27 PROCEDURE — 95117 PR IMMU2THERAPY, 2+ INJECTIONS: ICD-10-PCS | Mod: S$GLB,,, | Performed by: ALLERGY & IMMUNOLOGY

## 2022-09-27 PROCEDURE — 99999 PR PBB SHADOW E&M-EST. PATIENT-LVL I: CPT | Mod: PBBFAC,,,

## 2022-09-27 PROCEDURE — 95117 IMMUNOTHERAPY INJECTIONS: CPT | Mod: S$GLB,,, | Performed by: ALLERGY & IMMUNOLOGY

## 2022-09-27 PROCEDURE — 99999 PR PBB SHADOW E&M-EST. PATIENT-LVL I: ICD-10-PCS | Mod: PBBFAC,,,

## 2022-09-27 NOTE — PROGRESS NOTES
About 1 minute after 1st injection was given in upper Left arm, an area of erythema developed.  It was about 4cm in circumference above the injection site.  I had not noticed that area prior to giving the injection and patient stated it may have been her sweater that caused it.  I continued giving the other injection in mid-left arm, and then gave the right arm injection.  When I looked at the area of erythema again, it was beginning to turn redder and had developed a large, hive-looking area where the area of erythema had been.  I called Dr. Mujica in to the room and he said I most likely hit a lymph vessel.  He gave 10mg Zyrtec to the patient and explained to her it would be fine.  She stayed in clinic for 1/2 hr and no additional hives developed, no respiratory distress was noted.  Patient left clinic in [facility].

## 2022-10-04 ENCOUNTER — PATIENT OUTREACH (OUTPATIENT)
Dept: ADMINISTRATIVE | Facility: HOSPITAL | Age: 52
End: 2022-10-04
Payer: COMMERCIAL

## 2022-10-04 ENCOUNTER — CLINICAL SUPPORT (OUTPATIENT)
Dept: ALLERGY | Facility: CLINIC | Age: 52
End: 2022-10-04
Payer: COMMERCIAL

## 2022-10-04 DIAGNOSIS — J30.9 CHRONIC ALLERGIC RHINITIS: Primary | ICD-10-CM

## 2022-10-04 PROCEDURE — 99999 PR PBB SHADOW E&M-EST. PATIENT-LVL I: CPT | Mod: PBBFAC,,,

## 2022-10-04 PROCEDURE — 99499 NO LOS: ICD-10-PCS | Mod: S$GLB,,, | Performed by: ALLERGY & IMMUNOLOGY

## 2022-10-04 PROCEDURE — 99499 UNLISTED E&M SERVICE: CPT | Mod: S$GLB,,, | Performed by: ALLERGY & IMMUNOLOGY

## 2022-10-04 PROCEDURE — 99999 PR PBB SHADOW E&M-EST. PATIENT-LVL I: ICD-10-PCS | Mod: PBBFAC,,,

## 2022-10-04 PROCEDURE — 95117 IMMUNOTHERAPY INJECTIONS: CPT | Mod: S$GLB,,, | Performed by: ALLERGY & IMMUNOLOGY

## 2022-10-04 PROCEDURE — 95117 PR IMMU2THERAPY, 2+ INJECTIONS: ICD-10-PCS | Mod: S$GLB,,, | Performed by: ALLERGY & IMMUNOLOGY

## 2022-10-07 ENCOUNTER — PATIENT OUTREACH (OUTPATIENT)
Dept: ADMINISTRATIVE | Facility: HOSPITAL | Age: 52
End: 2022-10-07
Payer: COMMERCIAL

## 2022-10-07 ENCOUNTER — PATIENT MESSAGE (OUTPATIENT)
Dept: ADMINISTRATIVE | Facility: HOSPITAL | Age: 52
End: 2022-10-07
Payer: COMMERCIAL

## 2022-10-11 ENCOUNTER — CLINICAL SUPPORT (OUTPATIENT)
Dept: ALLERGY | Facility: CLINIC | Age: 52
End: 2022-10-11
Payer: COMMERCIAL

## 2022-10-11 DIAGNOSIS — J30.9 CHRONIC ALLERGIC RHINITIS: Primary | ICD-10-CM

## 2022-10-11 PROCEDURE — 99999 PR PBB SHADOW E&M-EST. PATIENT-LVL I: ICD-10-PCS | Mod: PBBFAC,,,

## 2022-10-11 PROCEDURE — 99499 UNLISTED E&M SERVICE: CPT | Mod: S$GLB,,, | Performed by: ALLERGY & IMMUNOLOGY

## 2022-10-11 PROCEDURE — 99999 PR PBB SHADOW E&M-EST. PATIENT-LVL I: CPT | Mod: PBBFAC,,,

## 2022-10-11 PROCEDURE — 95117 PR IMMU2THERAPY, 2+ INJECTIONS: ICD-10-PCS | Mod: S$GLB,,, | Performed by: ALLERGY & IMMUNOLOGY

## 2022-10-11 PROCEDURE — 95117 IMMUNOTHERAPY INJECTIONS: CPT | Mod: S$GLB,,, | Performed by: ALLERGY & IMMUNOLOGY

## 2022-10-11 PROCEDURE — 99499 NO LOS: ICD-10-PCS | Mod: S$GLB,,, | Performed by: ALLERGY & IMMUNOLOGY

## 2022-11-03 ENCOUNTER — IMMUNIZATION (OUTPATIENT)
Dept: INTERNAL MEDICINE | Facility: CLINIC | Age: 52
End: 2022-11-03
Payer: COMMERCIAL

## 2022-11-03 DIAGNOSIS — Z23 NEED FOR VACCINATION: Primary | ICD-10-CM

## 2022-11-03 PROCEDURE — 91312 COVID-19, MRNA, LNP-S, BIVALENT BOOSTER, PF, 30 MCG/0.3 ML DOSE: CPT | Mod: S$GLB,,, | Performed by: INTERNAL MEDICINE

## 2022-11-03 PROCEDURE — 0124A COVID-19, MRNA, LNP-S, BIVALENT BOOSTER, PF, 30 MCG/0.3 ML DOSE: CPT | Mod: PBBFAC | Performed by: INTERNAL MEDICINE

## 2022-11-03 PROCEDURE — 91312 COVID-19, MRNA, LNP-S, BIVALENT BOOSTER, PF, 30 MCG/0.3 ML DOSE: ICD-10-PCS | Mod: S$GLB,,, | Performed by: INTERNAL MEDICINE

## 2022-11-08 ENCOUNTER — CLINICAL SUPPORT (OUTPATIENT)
Dept: ALLERGY | Facility: CLINIC | Age: 52
End: 2022-11-08
Payer: COMMERCIAL

## 2022-11-08 DIAGNOSIS — J30.9 CHRONIC ALLERGIC RHINITIS: Primary | ICD-10-CM

## 2022-11-08 PROCEDURE — 99499 NO LOS: ICD-10-PCS | Mod: S$GLB,,, | Performed by: ALLERGY & IMMUNOLOGY

## 2022-11-08 PROCEDURE — 99999 PR PBB SHADOW E&M-EST. PATIENT-LVL I: CPT | Mod: PBBFAC,,,

## 2022-11-08 PROCEDURE — 99999 PR PBB SHADOW E&M-EST. PATIENT-LVL I: ICD-10-PCS | Mod: PBBFAC,,,

## 2022-11-08 PROCEDURE — 95117 IMMUNOTHERAPY INJECTIONS: CPT | Mod: S$GLB,,, | Performed by: ALLERGY & IMMUNOLOGY

## 2022-11-08 PROCEDURE — 99499 UNLISTED E&M SERVICE: CPT | Mod: S$GLB,,, | Performed by: ALLERGY & IMMUNOLOGY

## 2022-11-08 PROCEDURE — 95117 PR IMMU2THERAPY, 2+ INJECTIONS: ICD-10-PCS | Mod: S$GLB,,, | Performed by: ALLERGY & IMMUNOLOGY

## 2022-12-13 ENCOUNTER — CLINICAL SUPPORT (OUTPATIENT)
Dept: ALLERGY | Facility: CLINIC | Age: 52
End: 2022-12-13
Payer: COMMERCIAL

## 2022-12-13 PROCEDURE — 99999 PR PBB SHADOW E&M-EST. PATIENT-LVL I: CPT | Mod: PBBFAC,,,

## 2022-12-13 PROCEDURE — 95117 PR IMMU2THERAPY, 2+ INJECTIONS: ICD-10-PCS | Mod: S$GLB,,, | Performed by: ALLERGY & IMMUNOLOGY

## 2022-12-13 PROCEDURE — 99999 PR PBB SHADOW E&M-EST. PATIENT-LVL I: ICD-10-PCS | Mod: PBBFAC,,,

## 2022-12-13 PROCEDURE — 95117 IMMUNOTHERAPY INJECTIONS: CPT | Mod: S$GLB,,, | Performed by: ALLERGY & IMMUNOLOGY

## 2022-12-22 ENCOUNTER — DOCUMENTATION ONLY (OUTPATIENT)
Dept: ALLERGY | Facility: CLINIC | Age: 52
End: 2022-12-22
Payer: COMMERCIAL

## 2023-01-04 ENCOUNTER — CLINICAL SUPPORT (OUTPATIENT)
Dept: ALLERGY | Facility: CLINIC | Age: 53
End: 2023-01-04
Payer: COMMERCIAL

## 2023-01-04 DIAGNOSIS — J30.9 CHRONIC ALLERGIC RHINITIS: Primary | ICD-10-CM

## 2023-01-04 PROCEDURE — 95117 IMMUNOTHERAPY INJECTIONS: CPT | Mod: S$GLB,,, | Performed by: ALLERGY & IMMUNOLOGY

## 2023-01-04 PROCEDURE — 99999 PR PBB SHADOW E&M-EST. PATIENT-LVL I: CPT | Mod: PBBFAC,,,

## 2023-01-04 PROCEDURE — 95117 PR IMMU2THERAPY, 2+ INJECTIONS: ICD-10-PCS | Mod: S$GLB,,, | Performed by: ALLERGY & IMMUNOLOGY

## 2023-01-04 PROCEDURE — 99999 PR PBB SHADOW E&M-EST. PATIENT-LVL I: ICD-10-PCS | Mod: PBBFAC,,,

## 2023-01-05 NOTE — PROGRESS NOTES
Vial 3/3 had  and pt didn't receive an injection from this vial.  New vials have been ordered, but they had not arrived yet..

## 2023-01-10 PROCEDURE — 95165 PR PROFES SVC,IMMUNOTHER,SINGLE/MULT AGS: ICD-10-PCS | Mod: S$GLB,,, | Performed by: ALLERGY & IMMUNOLOGY

## 2023-01-10 PROCEDURE — 95165 ANTIGEN THERAPY SERVICES: CPT | Mod: S$GLB,,, | Performed by: ALLERGY & IMMUNOLOGY

## 2023-01-31 ENCOUNTER — CLINICAL SUPPORT (OUTPATIENT)
Dept: ALLERGY | Facility: CLINIC | Age: 53
End: 2023-01-31
Payer: COMMERCIAL

## 2023-01-31 DIAGNOSIS — J30.9 CHRONIC ALLERGIC RHINITIS: Primary | ICD-10-CM

## 2023-01-31 PROCEDURE — 95117 IMMUNOTHERAPY INJECTIONS: CPT | Mod: S$GLB,,, | Performed by: ALLERGY & IMMUNOLOGY

## 2023-01-31 PROCEDURE — 99999 PR PBB SHADOW E&M-EST. PATIENT-LVL I: ICD-10-PCS | Mod: PBBFAC,,,

## 2023-01-31 PROCEDURE — 99999 PR PBB SHADOW E&M-EST. PATIENT-LVL I: CPT | Mod: PBBFAC,,,

## 2023-01-31 PROCEDURE — 95117 PR IMMU2THERAPY, 2+ INJECTIONS: ICD-10-PCS | Mod: S$GLB,,, | Performed by: ALLERGY & IMMUNOLOGY

## 2023-02-20 DIAGNOSIS — J30.89 SEASONAL ALLERGIC RHINITIS DUE TO OTHER ALLERGIC TRIGGER: ICD-10-CM

## 2023-02-22 RX ORDER — LEVOCETIRIZINE DIHYDROCHLORIDE 5 MG/1
5 TABLET, FILM COATED ORAL NIGHTLY
Qty: 90 TABLET | Refills: 3 | Status: SHIPPED | OUTPATIENT
Start: 2023-02-22 | End: 2024-02-22

## 2023-02-28 ENCOUNTER — CLINICAL SUPPORT (OUTPATIENT)
Dept: ALLERGY | Facility: CLINIC | Age: 53
End: 2023-02-28
Payer: COMMERCIAL

## 2023-02-28 DIAGNOSIS — J30.9 CHRONIC ALLERGIC RHINITIS: Primary | ICD-10-CM

## 2023-02-28 PROCEDURE — 95117 IMMUNOTHERAPY INJECTIONS: CPT | Mod: S$GLB,,, | Performed by: ALLERGY & IMMUNOLOGY

## 2023-02-28 PROCEDURE — 99999 PR PBB SHADOW E&M-EST. PATIENT-LVL I: ICD-10-PCS | Mod: PBBFAC,,,

## 2023-02-28 PROCEDURE — 95117 PR IMMU2THERAPY, 2+ INJECTIONS: ICD-10-PCS | Mod: S$GLB,,, | Performed by: ALLERGY & IMMUNOLOGY

## 2023-02-28 PROCEDURE — 99999 PR PBB SHADOW E&M-EST. PATIENT-LVL I: CPT | Mod: PBBFAC,,,

## 2023-03-09 ENCOUNTER — PATIENT OUTREACH (OUTPATIENT)
Dept: ADMINISTRATIVE | Facility: HOSPITAL | Age: 53
End: 2023-03-09
Payer: COMMERCIAL

## 2023-03-09 NOTE — PROGRESS NOTES
Health Maintenance Due   Topic Date Due    Hepatitis C Screening  Never done    HIV Screening  Never done    TETANUS VACCINE  Never done    Colorectal Cancer Screening  Never done    Mammogram  07/30/2016    Shingles Vaccine (1 of 2) Never done        Chart reviewed. Triggered LINKS. Updated Care Everywhere.     Sujata Hammond CMA  Population Health Care Coordinator  Primary Care Team

## 2023-03-22 PROBLEM — Z90.13 H/O BILATERAL MASTECTOMY: Status: ACTIVE | Noted: 2023-03-22

## 2023-03-22 NOTE — PROGRESS NOTES
INTERNAL MEDICINE ESTABLISHED PATIENT VISIT NOTE    Subjective:     Chief Complaint: Annual Exam       Patient ID: Shae Trotter is a 52 y.o. female with Hx breast CA (dx'ed in 2006 on L and s/p L mastectomy, then had abnormal mmg in 2015 and had +BRCA2 testing so had R sided mastectomy as well, s/p chemo, no XRT, LN neg per hx, also had total hyst after BRCA testing done), allergic rhinitis c chronic urticaria, last seen by me 6/2020, here today for annual exam.    Feeling well overall.    Still sees Allergy clinic for shots and states sx have been well controlled.  Does note intermittent issues c neck pain which sometimes radiates down into her shoulder blade.      Past Medical History:  Past Medical History:   Diagnosis Date    Allergy     taking allx shots since 2006    Breast cancer 2006    breast cancer - tx with mastectomy, chemo - followed by Sunny Basurto    Genetic testing 11/2014    BRCA2 deleterious mutation    Other screening mammogram 6/24/2015    Seasonal allergies        Home Medications:  Prior to Admission medications    Medication Sig Start Date End Date Taking? Authorizing Provider   azelastine (ASTELIN) 137 mcg (0.1 %) nasal spray 2 sprays (274 mcg total) by Nasal route 2 (two) times daily. 11/10/21 11/10/22  Beau Gonzalez MD   cetirizine (ZYRTEC) 10 MG tablet Take 2 tablets (20 mg total) by mouth 2 (two) times daily. Start taking 5/15/2022 and continue through 5/17/2022. Take prior to Allergy appointment for 3 days 5/10/22 5/14/22  Beau Gonzalez MD   diazePAM (VALIUM) 2 MG tablet Take 1 tablet (2 mg total) by mouth every 6 (six) hours as needed (muscle spasm). Do not take more than 4 tabs in 24 hours 8/9/21 9/8/21  Sarah Rosado MD   EPINEPHrine (EPIPEN) 0.3 mg/0.3 mL AtIn Inject 0.3 mL (0.3 mg total) into the muscle once for 1 dose 6/14/22 10/28/22  Sunny Mujica MD   famotidine (PEPCID) 20 MG tablet Take 1 tablet (20 mg total) by mouth 2 (two) times daily. Start taking  5/15/2022 and continue through 5/17/2022. Take prior to Allergy appointment for 3 days 5/10/22 5/14/22  Beau Gonzalez MD   fluocinonide 0.05% (LIDEX) 0.05 % cream apply to affected area  of back twice daily as needed for  itching.  Patient taking differently: apply to affected area  of back twice daily as needed for  itching. 9/25/19   Margarita Barone MD   fluticasone propionate (FLONASE) 50 mcg/actuation nasal spray 1 spray (50 mcg total) by Each Nare route once daily.  Patient not taking: Reported on 5/17/2022 6/25/19   Cesar Quigley MD   levocetirizine (XYZAL) 5 MG tablet Take 1 tablet (5 mg total) by mouth every evening. 2/22/23 2/22/24  Beau Gonzalez MD       Allergies:  Review of patient's allergies indicates:   Allergen Reactions    Fish containing products Anaphylaxis    Aspirin      Other reaction(s): Hives    Avocado Hives     Other reaction(s): Unknown    Fruit extracts Hives     Other reaction(s): Unknown  Allergic to most fruits - hives/swelling    Minocycline Other (See Comments)     dizziness    Penicillins Hives       Social History:  Social History     Tobacco Use    Smoking status: Never    Smokeless tobacco: Never   Substance Use Topics    Alcohol use: No     Alcohol/week: 0.0 standard drinks    Drug use: No        Review of Systems   Constitutional:  Negative for activity change, appetite change, chills, fatigue, fever and unexpected weight change.   HENT:  Negative for congestion, hearing loss, rhinorrhea and trouble swallowing.    Eyes:  Negative for pain, discharge and visual disturbance.   Respiratory:  Negative for cough, chest tightness, shortness of breath and wheezing.    Cardiovascular:  Negative for chest pain, palpitations and leg swelling.   Gastrointestinal:  Negative for abdominal distention, abdominal pain, blood in stool, constipation, diarrhea and vomiting.   Endocrine: Negative for polydipsia and polyuria.   Genitourinary:  Negative for decreased urine volume,  "difficulty urinating, dysuria, hematuria, menstrual problem and vaginal discharge.   Musculoskeletal:  Positive for neck pain. Negative for arthralgias and joint swelling.   Neurological:  Negative for weakness, numbness and headaches.   Psychiatric/Behavioral:  Negative for behavioral problems, confusion and dysphoric mood.        Health Maintenance:     Immunizations:   Influenza 10/2022  TDap rec via [facility], declined  Prevnar rec at 64  Shingrix rec today, declined, Risks and benefits were discussed with patient.  COVID 12/2020, 1/2021, 9/2021, 11/2022     Cancer Screening:  PAP: n/a, hx hyst  Mammogram:  n/a  Colonoscopy:  rec now, pt declined but agreed to FIT kit, given today.  DEXA:  Can likely be checked around 54 based on BMI and hx total hyst.    Objective:   /70 (BP Location: Right arm, Patient Position: Sitting, BP Method: Medium (Manual))   Ht 5' 2" (1.575 m)   Wt 46.7 kg (103 lb)   LMP 07/10/2012   BMI 18.84 kg/m²        General: AAO x3, no apparent distress  HEENT: PERRL, OP clear  CV: RRR, no m/r/g  Pulm: Lungs CTAB, no crackles, no wheezes  Abd: s/NT/ND +BS  Extremities: no c/c/e  Neck: slightly tight mm on L trapezius mm.    Labs:     Lab Results   Component Value Date    WBC 5.28 06/16/2020    HGB 12.8 06/16/2020    HCT 43.3 06/16/2020    MCV 89 06/16/2020     06/16/2020     Sodium   Date Value Ref Range Status   06/16/2020 141 136 - 145 mmol/L Final     Potassium   Date Value Ref Range Status   06/16/2020 4.2 3.5 - 5.1 mmol/L Final     Chloride   Date Value Ref Range Status   06/16/2020 106 95 - 110 mmol/L Final     CO2   Date Value Ref Range Status   06/16/2020 27 23 - 29 mmol/L Final     Glucose   Date Value Ref Range Status   06/16/2020 96 70 - 110 mg/dL Final     BUN   Date Value Ref Range Status   06/16/2020 15 6 - 20 mg/dL Final     Creatinine   Date Value Ref Range Status   06/16/2020 0.8 0.5 - 1.4 mg/dL Final     Calcium   Date Value Ref Range Status   06/16/2020 10.4 8.7 - " 10.5 mg/dL Final     Total Protein   Date Value Ref Range Status   06/16/2020 7.4 6.0 - 8.4 g/dL Final     Albumin   Date Value Ref Range Status   06/16/2020 3.9 3.5 - 5.2 g/dL Final     Total Bilirubin   Date Value Ref Range Status   06/16/2020 0.3 0.1 - 1.0 mg/dL Final     Comment:     For infants and newborns, interpretation of results should be based  on gestational age, weight and in agreement with clinical  observations.  Premature Infant recommended reference ranges:  Up to 24 hours.............<8.0 mg/dL  Up to 48 hours............<12.0 mg/dL  3-5 days..................<15.0 mg/dL  6-29 days.................<15.0 mg/dL       Alkaline Phosphatase   Date Value Ref Range Status   06/16/2020 77 55 - 135 U/L Final     AST   Date Value Ref Range Status   06/16/2020 12 10 - 40 U/L Final     ALT   Date Value Ref Range Status   06/16/2020 8 (L) 10 - 44 U/L Final     Anion Gap   Date Value Ref Range Status   06/16/2020 8 8 - 16 mmol/L Final     eGFR if    Date Value Ref Range Status   06/16/2020 >60.0 >60 mL/min/1.73 m^2 Final     eGFR if non    Date Value Ref Range Status   06/16/2020 >60.0 >60 mL/min/1.73 m^2 Final     Comment:     Calculation used to obtain the estimated glomerular filtration  rate (eGFR) is the CKD-EPI equation.        Lab Results   Component Value Date    HGBA1C 5.3 06/16/2020     Lab Results   Component Value Date    LDLCALC 165.4 (H) 06/16/2020     Lab Results   Component Value Date    TSH 1.671 06/16/2020         Assessment/Plan     Shae was seen today for annual exam.    Diagnoses and all orders for this visit:    Visit for annual health examination  History and physical exam completed.  Health maintenance reviewed as above.  -     CBC Auto Differential; Future  -     Comprehensive Metabolic Panel; Future  -     Hemoglobin A1C; Future  -     Lipid Panel; Future  -     TSH; Future  -     HIV 1/2 Ag/Ab (4th Gen); Future  -     HEPATITIS C ANTIBODY;  Future    Mixed hyperlipidemia  Lab Results   Component Value Date    LDLCALC 165.4 (H) 06/16/2020     Low ASCVD score.  Will repeat labs and recalculate.  Rec low fat diet.  Avoid red meats and fried foods as well as cream-based foods and processed foods.      BRCA2 positive  Hx of breast cancer  H/O bilateral mastectomy  S/P laparoscopic hysterectomy  No acute issues    Non-seasonal allergic rhinitis due to pollen  Cont f/u c allergy, sx well controlled    Trapezius muscle spasm  As per HPI  Rec otc nsaids prn as well as heating pad, massage and stretching exercises  Can rx Flexeril in future if sx not improved    Other eczema  -     triamcinolone acetonide 0.1% (KENALOG) 0.1 % cream; Apply topically 2 (two) times daily.    Screen for colon cancer  -     Fecal Immunochemical Test (iFOBT); Future    Screening for HIV (human immunodeficiency virus)  -     HIV 1/2 Ag/Ab (4th Gen); Future    Need for hepatitis C screening test  -     HEPATITIS C ANTIBODY; Future      HM as above  RTC in 12 mos, fasting labs tomorrow at [facility], sooner if needed.    Dalila Phoenix MD  Department of Internal Medicine - [facility]  03/23/2023

## 2023-03-23 ENCOUNTER — OFFICE VISIT (OUTPATIENT)
Dept: INTERNAL MEDICINE | Facility: CLINIC | Age: 53
End: 2023-03-23
Payer: COMMERCIAL

## 2023-03-23 VITALS
HEIGHT: 62 IN | WEIGHT: 103 LBS | BODY MASS INDEX: 18.95 KG/M2 | SYSTOLIC BLOOD PRESSURE: 118 MMHG | DIASTOLIC BLOOD PRESSURE: 70 MMHG

## 2023-03-23 DIAGNOSIS — J30.1 NON-SEASONAL ALLERGIC RHINITIS DUE TO POLLEN: ICD-10-CM

## 2023-03-23 DIAGNOSIS — L30.8 OTHER ECZEMA: ICD-10-CM

## 2023-03-23 DIAGNOSIS — Z11.4 SCREENING FOR HIV (HUMAN IMMUNODEFICIENCY VIRUS): ICD-10-CM

## 2023-03-23 DIAGNOSIS — Z00.00 VISIT FOR ANNUAL HEALTH EXAMINATION: Primary | ICD-10-CM

## 2023-03-23 DIAGNOSIS — Z85.3 HX OF BREAST CANCER: ICD-10-CM

## 2023-03-23 DIAGNOSIS — Z90.710 S/P LAPAROSCOPIC HYSTERECTOMY: ICD-10-CM

## 2023-03-23 DIAGNOSIS — Z90.13 H/O BILATERAL MASTECTOMY: ICD-10-CM

## 2023-03-23 DIAGNOSIS — Z12.11 SCREEN FOR COLON CANCER: ICD-10-CM

## 2023-03-23 DIAGNOSIS — Z15.01 BRCA2 POSITIVE: ICD-10-CM

## 2023-03-23 DIAGNOSIS — E78.2 MIXED HYPERLIPIDEMIA: ICD-10-CM

## 2023-03-23 DIAGNOSIS — Z11.59 NEED FOR HEPATITIS C SCREENING TEST: ICD-10-CM

## 2023-03-23 DIAGNOSIS — M62.838 TRAPEZIUS MUSCLE SPASM: ICD-10-CM

## 2023-03-23 DIAGNOSIS — Z15.09 BRCA2 POSITIVE: ICD-10-CM

## 2023-03-23 PROCEDURE — 99999 PR PBB SHADOW E&M-EST. PATIENT-LVL III: CPT | Mod: PBBFAC,,, | Performed by: INTERNAL MEDICINE

## 2023-03-23 PROCEDURE — 3008F BODY MASS INDEX DOCD: CPT | Mod: CPTII,S$GLB,, | Performed by: INTERNAL MEDICINE

## 2023-03-23 PROCEDURE — 1159F PR MEDICATION LIST DOCUMENTED IN MEDICAL RECORD: ICD-10-PCS | Mod: CPTII,S$GLB,, | Performed by: INTERNAL MEDICINE

## 2023-03-23 PROCEDURE — 1159F MED LIST DOCD IN RCRD: CPT | Mod: CPTII,S$GLB,, | Performed by: INTERNAL MEDICINE

## 2023-03-23 PROCEDURE — 99396 PREV VISIT EST AGE 40-64: CPT | Mod: S$GLB,,, | Performed by: INTERNAL MEDICINE

## 2023-03-23 PROCEDURE — 1160F PR REVIEW ALL MEDS BY PRESCRIBER/CLIN PHARMACIST DOCUMENTED: ICD-10-PCS | Mod: CPTII,S$GLB,, | Performed by: INTERNAL MEDICINE

## 2023-03-23 PROCEDURE — 3078F DIAST BP <80 MM HG: CPT | Mod: CPTII,S$GLB,, | Performed by: INTERNAL MEDICINE

## 2023-03-23 PROCEDURE — 99396 PR PREVENTIVE VISIT,EST,40-64: ICD-10-PCS | Mod: S$GLB,,, | Performed by: INTERNAL MEDICINE

## 2023-03-23 PROCEDURE — 3078F PR MOST RECENT DIASTOLIC BLOOD PRESSURE < 80 MM HG: ICD-10-PCS | Mod: CPTII,S$GLB,, | Performed by: INTERNAL MEDICINE

## 2023-03-23 PROCEDURE — 3074F SYST BP LT 130 MM HG: CPT | Mod: CPTII,S$GLB,, | Performed by: INTERNAL MEDICINE

## 2023-03-23 PROCEDURE — 99999 PR PBB SHADOW E&M-EST. PATIENT-LVL III: ICD-10-PCS | Mod: PBBFAC,,, | Performed by: INTERNAL MEDICINE

## 2023-03-23 PROCEDURE — 3074F PR MOST RECENT SYSTOLIC BLOOD PRESSURE < 130 MM HG: ICD-10-PCS | Mod: CPTII,S$GLB,, | Performed by: INTERNAL MEDICINE

## 2023-03-23 PROCEDURE — 3008F PR BODY MASS INDEX (BMI) DOCUMENTED: ICD-10-PCS | Mod: CPTII,S$GLB,, | Performed by: INTERNAL MEDICINE

## 2023-03-23 PROCEDURE — 1160F RVW MEDS BY RX/DR IN RCRD: CPT | Mod: CPTII,S$GLB,, | Performed by: INTERNAL MEDICINE

## 2023-03-23 RX ORDER — TRIAMCINOLONE ACETONIDE 1 MG/G
CREAM TOPICAL 2 TIMES DAILY
Qty: 45 G | Refills: 0 | Status: SHIPPED | OUTPATIENT
Start: 2023-03-23

## 2023-03-24 ENCOUNTER — LAB VISIT (OUTPATIENT)
Dept: LAB | Facility: OTHER | Age: 53
End: 2023-03-24
Attending: INTERNAL MEDICINE
Payer: COMMERCIAL

## 2023-03-24 DIAGNOSIS — Z11.4 SCREENING FOR HIV (HUMAN IMMUNODEFICIENCY VIRUS): ICD-10-CM

## 2023-03-24 DIAGNOSIS — Z11.59 NEED FOR HEPATITIS C SCREENING TEST: ICD-10-CM

## 2023-03-24 DIAGNOSIS — Z00.00 VISIT FOR ANNUAL HEALTH EXAMINATION: ICD-10-CM

## 2023-03-24 LAB
ALBUMIN SERPL BCP-MCNC: 4.1 G/DL (ref 3.5–5.2)
ALP SERPL-CCNC: 83 U/L (ref 55–135)
ALT SERPL W/O P-5'-P-CCNC: 14 U/L (ref 10–44)
ANION GAP SERPL CALC-SCNC: 10 MMOL/L (ref 8–16)
AST SERPL-CCNC: 16 U/L (ref 10–40)
BASOPHILS # BLD AUTO: 0.01 K/UL (ref 0–0.2)
BASOPHILS NFR BLD: 0.2 % (ref 0–1.9)
BILIRUB SERPL-MCNC: 0.5 MG/DL (ref 0.1–1)
BUN SERPL-MCNC: 15 MG/DL (ref 6–20)
CALCIUM SERPL-MCNC: 9.8 MG/DL (ref 8.7–10.5)
CHLORIDE SERPL-SCNC: 103 MMOL/L (ref 95–110)
CHOLEST SERPL-MCNC: 274 MG/DL (ref 120–199)
CHOLEST/HDLC SERPL: 3.3 {RATIO} (ref 2–5)
CO2 SERPL-SCNC: 27 MMOL/L (ref 23–29)
CREAT SERPL-MCNC: 0.8 MG/DL (ref 0.5–1.4)
DIFFERENTIAL METHOD: ABNORMAL
EOSINOPHIL # BLD AUTO: 0 K/UL (ref 0–0.5)
EOSINOPHIL NFR BLD: 0 % (ref 0–8)
ERYTHROCYTE [DISTWIDTH] IN BLOOD BY AUTOMATED COUNT: 13.9 % (ref 11.5–14.5)
EST. GFR  (NO RACE VARIABLE): >60 ML/MIN/1.73 M^2
ESTIMATED AVG GLUCOSE: 105 MG/DL (ref 68–131)
GLUCOSE SERPL-MCNC: 88 MG/DL (ref 70–110)
HBA1C MFR BLD: 5.3 % (ref 4–5.6)
HCT VFR BLD AUTO: 41.5 % (ref 37–48.5)
HCV AB SERPL QL IA: NORMAL
HDLC SERPL-MCNC: 82 MG/DL (ref 40–75)
HDLC SERPL: 29.9 % (ref 20–50)
HGB BLD-MCNC: 12.8 G/DL (ref 12–16)
HIV 1+2 AB+HIV1 P24 AG SERPL QL IA: NORMAL
IMM GRANULOCYTES # BLD AUTO: 0.02 K/UL (ref 0–0.04)
IMM GRANULOCYTES NFR BLD AUTO: 0.4 % (ref 0–0.5)
LDLC SERPL CALC-MCNC: 180.6 MG/DL (ref 63–159)
LYMPHOCYTES # BLD AUTO: 2.6 K/UL (ref 1–4.8)
LYMPHOCYTES NFR BLD: 46 % (ref 18–48)
MCH RBC QN AUTO: 26.4 PG (ref 27–31)
MCHC RBC AUTO-ENTMCNC: 30.8 G/DL (ref 32–36)
MCV RBC AUTO: 86 FL (ref 82–98)
MONOCYTES # BLD AUTO: 0.4 K/UL (ref 0.3–1)
MONOCYTES NFR BLD: 6.5 % (ref 4–15)
NEUTROPHILS # BLD AUTO: 2.6 K/UL (ref 1.8–7.7)
NEUTROPHILS NFR BLD: 46.9 % (ref 38–73)
NONHDLC SERPL-MCNC: 192 MG/DL
NRBC BLD-RTO: 0 /100 WBC
PLATELET # BLD AUTO: 341 K/UL (ref 150–450)
PMV BLD AUTO: 9.7 FL (ref 9.2–12.9)
POTASSIUM SERPL-SCNC: 3.6 MMOL/L (ref 3.5–5.1)
PROT SERPL-MCNC: 7.7 G/DL (ref 6–8.4)
RBC # BLD AUTO: 4.84 M/UL (ref 4–5.4)
SODIUM SERPL-SCNC: 140 MMOL/L (ref 136–145)
TRIGL SERPL-MCNC: 57 MG/DL (ref 30–150)
TSH SERPL DL<=0.005 MIU/L-ACNC: 2.45 UIU/ML (ref 0.4–4)
WBC # BLD AUTO: 5.54 K/UL (ref 3.9–12.7)

## 2023-03-24 PROCEDURE — 83036 HEMOGLOBIN GLYCOSYLATED A1C: CPT | Performed by: INTERNAL MEDICINE

## 2023-03-24 PROCEDURE — 87389 HIV-1 AG W/HIV-1&-2 AB AG IA: CPT | Performed by: INTERNAL MEDICINE

## 2023-03-24 PROCEDURE — 85025 COMPLETE CBC W/AUTO DIFF WBC: CPT | Performed by: INTERNAL MEDICINE

## 2023-03-24 PROCEDURE — 36415 COLL VENOUS BLD VENIPUNCTURE: CPT | Performed by: INTERNAL MEDICINE

## 2023-03-24 PROCEDURE — 84443 ASSAY THYROID STIM HORMONE: CPT | Performed by: INTERNAL MEDICINE

## 2023-03-24 PROCEDURE — 86803 HEPATITIS C AB TEST: CPT | Performed by: INTERNAL MEDICINE

## 2023-03-24 PROCEDURE — 80053 COMPREHEN METABOLIC PANEL: CPT | Performed by: INTERNAL MEDICINE

## 2023-03-24 PROCEDURE — 80061 LIPID PANEL: CPT | Performed by: INTERNAL MEDICINE

## 2023-03-25 DIAGNOSIS — E78.2 MIXED HYPERLIPIDEMIA: Primary | ICD-10-CM

## 2023-03-28 ENCOUNTER — PATIENT MESSAGE (OUTPATIENT)
Dept: ADMINISTRATIVE | Facility: HOSPITAL | Age: 53
End: 2023-03-28
Payer: COMMERCIAL

## 2023-03-28 ENCOUNTER — CLINICAL SUPPORT (OUTPATIENT)
Dept: ALLERGY | Facility: CLINIC | Age: 53
End: 2023-03-28
Payer: COMMERCIAL

## 2023-03-28 DIAGNOSIS — J30.9 CHRONIC ALLERGIC RHINITIS: Primary | ICD-10-CM

## 2023-03-28 PROCEDURE — 95117 IMMUNOTHERAPY INJECTIONS: CPT | Mod: S$GLB,,, | Performed by: STUDENT IN AN ORGANIZED HEALTH CARE EDUCATION/TRAINING PROGRAM

## 2023-03-28 PROCEDURE — 99999 PR PBB SHADOW E&M-EST. PATIENT-LVL I: ICD-10-PCS | Mod: PBBFAC,,,

## 2023-03-28 PROCEDURE — 99999 PR PBB SHADOW E&M-EST. PATIENT-LVL I: CPT | Mod: PBBFAC,,,

## 2023-03-28 PROCEDURE — 95117 PR IMMU2THERAPY, 2+ INJECTIONS: ICD-10-PCS | Mod: S$GLB,,, | Performed by: STUDENT IN AN ORGANIZED HEALTH CARE EDUCATION/TRAINING PROGRAM

## 2023-04-19 ENCOUNTER — RESEARCH ENCOUNTER (OUTPATIENT)
Dept: RESEARCH | Facility: OTHER | Age: 53
End: 2023-04-19
Payer: COMMERCIAL

## 2023-04-19 NOTE — RESEARCH
Sponsor: [employer] ([employer])     Study Title/IRB Number: [id number]    Principle Investigator: Dr. Se Garzon    Patient eligibility was checked prior to enrollment in the study. Patient met the following inclusion and exclusion criteria:     INCLUSION CRITERIA  Participant age is 50 years or older at the time of signing the Informed Consent form  Participant is capable of giving signed Informed Consent, which includes compliance with the requirements and restrictions in the informed consent form and the protocol    EXCLUSION CRITERIA  Participant is undergoing or referred for diagnostic evaluation due to clinical suspicion for cancer  Participant has a personal history of invasive or hematologic malignancy, diagnosed within the last 3 years prior to expected enrollment date or diagnosed greater than 3 years prior to expected enrollment date and never treated  Participant has had definitive treatment for invasive or hematologic malignancy within the 3 years prior to expected enrollment date  Participant is not able to comply with protocol procedures  Participant is not a current patient at a participating center  Participant is currently enrolled or was previously enrolled in another [employer] ([employer])-sponsored study  Participant is current or previous employee/contractor of [employer] ([employer]  Participant is currently pregnant (by participant's self-report of pregnancy status)    Prior to the Informed Consent (IC) being signed, or any study protocol required data collection, testing, procedure, or intervention being performed, the following was done and/or discussed:  Patient was given a copy of the IC for review   Purpose of the study and qualifications to participate   Study design, Follow up schedule, and tests or procedures done at each visit  Confidentiality and HIPAA Authorization for Release of Medical Records for the research trial/ subject's rights/research related injury  Risk, Benefits, Alternative Treatments, Compensation and  "Costs  Participation in the research trial is voluntary and patient may withdraw at anytime  Contact information for study related questions    Patient verbalizes understanding of the above: Yes  Contact information for CRC and PI given to patient: Yes  Patient able to adequately summarize: the purpose of the study, the risks associated with the study, and all procedures, testing, and follow-ups associated with the study: Yes    Patient signed the informed consent form for the research study with an IRB approval date of 4/25/2022. Each page of the consent form was reviewed with patient and all questions answered satisfactorily.   Patient received a copy of the consent form. The original consent was scanned into electronic medical records.    Anthropometric Data    Participant is a 52 y.o., Black or , Not  or /a, female  Participant height: 5' 1"     Participant weight: 103 lbs      Smoking History and Alcohol use     Please indicate whether the participant smoked at least 100 cigarettes in their lifetime:   No  Please indicate whether the participant is a current smoker:  No  Age participant started smoking cigarettes: Not Applicable  Age participant stopped smoking cigarettes: Not Applicable  During their time as a smoker, please indicate if the participant stopped smoking for a month or more: Not Applicable  Please indicate how many cigarettes per day did the participant smoke on average for the majority of their time as a smoker: Blank single: Not Applicable    During the last 12 months, how often did the participant have any kind of drink containing alcohol? Count as one drink a 12 ounce can or glass of beer, a 5 ounce glass of wine, or a drink containing 1 shot of liquor. Less than once a week}  During the last 12 months, how many drinks did the participant have on days when they drank alcohol?Less than one drink per day    Blood Draw    Following IC being signed and prior to blood " draw, patient completed all baseline/pretest questionnaires. The following specimens were collected from the pt at the time of this encounter via peripheral blood draw.    Blood draw location: Right Arm  Needle used: 21 gauge butterfly needle  Blood draw amount: 40ml  Blood draw time: 9:52am 4/19/2023

## 2023-04-21 ENCOUNTER — TELEPHONE (OUTPATIENT)
Dept: REHABILITATION | Facility: OTHER | Age: 53
End: 2023-04-21
Payer: COMMERCIAL

## 2023-04-21 ENCOUNTER — PATIENT MESSAGE (OUTPATIENT)
Dept: ADMINISTRATIVE | Facility: HOSPITAL | Age: 53
End: 2023-04-21
Payer: COMMERCIAL

## 2023-04-24 ENCOUNTER — CLINICAL SUPPORT (OUTPATIENT)
Dept: REHABILITATION | Facility: OTHER | Age: 53
End: 2023-04-24
Payer: COMMERCIAL

## 2023-04-24 ENCOUNTER — CLINICAL SUPPORT (OUTPATIENT)
Dept: REHABILITATION | Facility: OTHER | Age: 53
End: 2023-04-24
Attending: PHYSICAL MEDICINE & REHABILITATION
Payer: COMMERCIAL

## 2023-04-24 VITALS
HEART RATE: 77 BPM | BODY MASS INDEX: 19.27 KG/M2 | DIASTOLIC BLOOD PRESSURE: 70 MMHG | SYSTOLIC BLOOD PRESSURE: 110 MMHG | WEIGHT: 104.75 LBS | HEIGHT: 62 IN

## 2023-04-24 DIAGNOSIS — M54.50 RECURRENT LOW BACK PAIN: Primary | ICD-10-CM

## 2023-04-24 DIAGNOSIS — M70.62 TROCHANTERIC BURSITIS OF LEFT HIP: ICD-10-CM

## 2023-04-24 PROCEDURE — 97750 PHYSICAL PERFORMANCE TEST: CPT | Mod: 32 | Performed by: PHYSICAL MEDICINE & REHABILITATION

## 2023-04-24 NOTE — PROGRESS NOTES
Health  met with patient to complete initial outcomes for the Healthy Back lumbar program.  Questions were reviewed with patient and discussed with Dr. Breen. The patient will meet with Dr. Breen to determine program enrollment.     Any changes to patient answers are below in red font.    HealthyBack Questionnaire  4/24/2023   Please select the location of your worst pain:  Lower Back   Please select the location of any additional pain: (hold down the control key, and click to select multiple responses) L. Glute; Neck, Upper back, Mid back- Above shoulder blades, Mid back- Below shoulder blades, Leg- Left    Did the pain begin after an event, injury, or accident? No   How long has this pain been going on?  5 years   Please indicate how the pain is changing.  Worsening   What is the WORST level of pain that you have experienced in the last week?  6   What is the LEAST level of pain that you have experienced in the past week?  0   What can you NOT do not that you used to be able to do?  Na   Are your limitations mainly due to your pain?  Yes   What are your additional complaints, if any? Weakness   Is there ever a time during the day when your pain stops, even for a brief moment, before returning? Yes   If yes, when your pain stops, does it disappear completely? Is it totally gone? Yes   Does bending forward make your typical pain worse? Yes   Morning: better   Afternoon: worse   Evening:  worse   Nighttime: worse   Standing:  worse   Lying on stomach: worse   Lying on back: worse   Sitting:  worse   Walking: better   Climbing stairs: better   Emergency department  No   Health care providers (hold down the control key, and click to select multiple responses) Family doctor, Pain management   Investigations done (hold down the control key, and click to select multiple responses) X-ray   Procedures (hold down the control key, and click to select multiple responses) None   Have you had any surgery on your back?   No   Please vladimir what you are experiencing. (hold down the control key, and click to select multiple responses) None   First activity you would like to perform better:  Prolonged sitting at desk.   Second activity you would like to perform better: Bending over to pick things up (ex: dog)   Third activity you would like to perform better: standing   What is your occupation?     What is your highest level of education? College   What is your work status? Employed   How did you hear about the Healthyback program?  Employer referral

## 2023-04-24 NOTE — PROGRESS NOTES
Subjective:     Patient ID: Shae Trotter is a 52 y.o. female.    Chief Complaint: No chief complaint on file.    Ms Trotter is a 53 yo female here for evaluation for the healthy back lumbar program.  she has had low back pain for on and off for 5 years it has gradually gotten worse.  Her last flare was one week ago bending to get dog after standing.  It is improving. She will have flares a couple times a year.  The pain is left low back dominant, and across to the right and left glute and left lateral thigh to midthigh. The outer hip pain is more chronic and does not relate to her back flares. The pain is an achy pain. There is no tingling, numbness, burning, or weakness.  The pain is intermittent 0-6/10.  It is worse with bending, sitting, standing, lying on back and stomach, afternoon, evening, and nighttime.  It is better with walking, climbing stairs, lying on side, with HEP, and morning.  She had local steroid injection 2 years ago with no relief.  She has not done pt, chiropractor or had surgery.  Her goals are prolonged sitting at desk, bending to pick things up, and standing.  Pattern 1    X-ray cervical 2020  Cervical vertebral body heights and alignment appear maintained.  There is mild multilevel discogenic change, most pronounced at C5-C6 with some degree of intervertebral disc space narrowing at this level.  Lateral masses appear well seated.  Slight asymmetry of the lateral atlantodental interval (right greater than left), likely related to partial head rotation.  Precervical soft tissues demonstrate normal thickness.     Impression:     As above.    Past Medical History:  No date: Allergy      Comment:  taking allx shots since 2006  2006: Breast cancer      Comment:  breast cancer - tx with mastectomy, chemo - followed by                Sunny Basurto  11/2014: Genetic testing      Comment:  BRCA2 deleterious mutation  6/24/2015: Other screening mammogram  No date: Seasonal allergies    Past  "Surgical History:  No date: COSMETIC SURGERY      Comment:  right mastectomy after genetic testing returned  No date: D&C hysterscope.  4/13/15: HYSTERECTOMY      Comment:  Robotic LH/BSO   2006: Left mastectomy and chemo; Left      Comment:  TRAM flap reconstruction   2006: MASTECTOMY; Left  No date: AK REMOVAL OF OVARY/TUBE(S)  No date: Right breast cysts removed  2006: tram flap ; Left      Comment:  breast reconstruction.     Review of patient's family history indicates:      Social History    Socioeconomic History      Marital status:     Occupational History      Occupation:     Tobacco Use      Smoking status: Never      Smokeless tobacco: Never    Substance and Sexual Activity      Alcohol use: No        Alcohol/week: 0.0 standard drinks      Drug use: No      Sexual activity: Yes        Partners: Male        Birth control/protection: See Surgical Hx, Partner-Vasectomy        Comment:  "Romeo"    Other Topics      Concerns:        Are you pregnant or think you may be?: No        Breast-feeding: No    Social History Narrative      [place]      [place] with  and daughter 17 and son 29      Not exercising reg    Social Determinants of Health  Financial Resource Strain: Low Risk       Difficulty of Paying Living Expenses: Not hard at all  Food Insecurity: No Food Insecurity      Worried About Running Out of Food in the Last Year: Never true      Ran Out of Food in the Last Year: Never true  Transportation Needs: No Transportation Needs      Lack of Transportation (Medical): No      Lack of Transportation (Non-Medical): No  Physical Activity: Inactive      Days of Exercise per Week: 0 days      Minutes of Exercise per Session: 0 min  Stress: No Stress Concern Present      Feeling of Stress : Only a little  Social Connections: Unknown      Frequency of Communication with Friends and Family: More than three times a week      Frequency of Social Gatherings with Friends and " Family: Three times a week      Active Member of Clubs or Organizations: No      Attends Club or Organization Meetings: Never      Marital Status:   Housing Stability: Unknown      Unable to Pay for Housing in the Last Year: No      Unstable Housing in the Last Year: No    Current Outpatient Medications:  cetirizine (ZYRTEC) 10 MG tablet, Take 2 tablets (20 mg total) by mouth 2 (two) times daily. Start taking 5/15/2022 and continue through 5/17/2022. Take prior to Allergy appointment for 3 days, Disp: 12 tablet, Rfl: 0  EPINEPHrine (EPIPEN) 0.3 mg/0.3 mL AtIn, Inject 0.3 mL (0.3 mg total) into the muscle once for 1 dose, Disp: 2 each, Rfl: 2  levocetirizine (XYZAL) 5 MG tablet, Take 1 tablet (5 mg total) by mouth every evening., Disp: 90 tablet, Rfl: 3  triamcinolone acetonide 0.1% (KENALOG) 0.1 % cream, Apply topically 2 (two) times daily., Disp: 45 g, Rfl: 0    Current Facility-Administered Medications:  Allergy Mix, , Subcutaneous, 1 time in Clinic/HOD, Sunny Mujica MD  Allergy Mix, , Subcutaneous, 1 time in Clinic/HOD, Sunny Mujica MD        Review of patient's allergies indicates:   -- Fish containing products -- Anaphylaxis   -- Aspirin     --  Other reaction(s): Hives   -- Avocado -- Hives    --  Other reaction(s): Unknown   -- Fruit extracts -- Hives    --  Other reaction(s): Unknown             Allergic to most fruits - hives/swelling   -- Minocycline -- Other (See Comments)    --  dizziness   -- Penicillins -- Hives        Review of Systems   Constitutional: Negative for weight gain and weight loss.   Cardiovascular:  Negative for chest pain.   Respiratory:  Negative for shortness of breath.    Musculoskeletal:  Positive for back pain and neck pain. Negative for joint pain and joint swelling.   Gastrointestinal:  Negative for abdominal pain, bowel incontinence, nausea and vomiting.   Genitourinary:  Negative for bladder incontinence.   Neurological:  Negative for numbness and paresthesias.       Objective:     General: Shae is well-developed, well-nourished, appears stated age, in no acute distress, alert and oriented to time, place and person.     General    Vitals reviewed.  Constitutional: She is oriented to person, place, and time. She appears well-developed and well-nourished.   HENT:   Head: Normocephalic and atraumatic.   Pulmonary/Chest: Effort normal.   Neurological: She is alert and oriented to person, place, and time.   Psychiatric: She has a normal mood and affect. Her behavior is normal. Judgment and thought content normal.     General Musculoskeletal Exam   Gait: normal     Right Ankle/Foot Exam     Tests   Heel Walk: able to perform  Tiptoe Walk: able to perform    Left Ankle/Foot Exam     Tests   Heel Walk: able to perform  Tiptoe Walk: able to perform  Left Hip Exam     Tenderness  Also left side trochanteric tenderness.      Back (L-Spine & T-Spine) / Neck (C-Spine) Exam     Tenderness   The patient is tender to palpation of the left side trochanteric. Left paramedian tenderness of the Sacrum.     Back (L-Spine & T-Spine) Range of Motion   Extension:  30   Flexion:  90   Lateral bend right:  20   Lateral bend left:  20   Rotation right:  40   Rotation left:  40     Spinal Sensation   Right Side Sensation  C-Spine Level: normal   L-Spine Level: normal  S-Spine Level: normal  Left Side Sensation  C-Spine Level: normal  L-Spine Level: normal  S-Spine Level: normal    Back (L-Spine & T-Spine) Tests   Right Side Tests  Straight leg raise:        Sitting SLR: > 70 degrees    Left Side Tests  Straight leg raise:       Sitting SLR: > 70 degrees      Other   She has no scoliosis .  Spinal Kyphosis:  Absent    Comments:  Neg LOUIE with midline low back pain on right and left hip pain on left      Muscle Strength   Right Upper Extremity   Biceps: 5/5   Deltoid:  5/5  Triceps:  5/5  Wrist extension: 5/5   Finger Flexors:  5/5  Left Upper Extremity  Biceps: 5/5   Deltoid:  5/5  Triceps:   5/5  Wrist extension: 5/5   Finger Flexors:  5/5  Right Lower Extremity   Hip Flexion: 5/5   Quadriceps:  5/5   Anterior tibial:  5/5   EHL:  5/5  Left Lower Extremity   Hip Flexion: 5/5   Quadriceps:  5/5   Anterior tibial:  5/5   EHL:  5/5    Reflexes     Left Side  Biceps:  2+  Triceps:  2+  Brachioradialis:  2+  Achilles:  2+  Left Cobb's Sign:  Absent  Babinski Sign:  absent  Quadriceps:  2+    Right Side   Biceps:  2+  Triceps:  2+  Brachioradialis:  2+  Achilles:  2+  Right Cobb's Sign:  absent  Babinski Sign:  absent  Quadriceps:  2+    Vascular Exam     Right Pulses        Carotid:                  2+    Left Pulses        Carotid:                  2+        Assessment:     1. Recurrent low back pain    2. Trochanteric bursitis of left hip         Plan:     Orders Placed This Encounter    Ambulatory referral/consult to [facility]     The patient has had a history of low back pain with limitations in there activities of Daily living    Previous treatment has not provided relief.    The situation was discussed at length with the patient.  We discussed different causes of back pain and different treatment options.  We discussed the importance of stretching and strengthening.  We discussed posture.  We discussed the pros and cons of further diagnostic testing, alternative treatment and Medications    Based on the history, physical exam, and functional index, an active physical therapy program is recommended.  The goal is to restore the patients function and reduce pain.  A program of progressive resistance exercises, biomechanical, and mobility maneuvers, instructions in proper body mechanics, aerobic conditioning and HEP will be utilized. The program will continue as long as making improvements.    An assessment of patients progress will be made at each visit to document change in status.    The patient will be actively involved in their own treatment, and responsible for appointments and home  program    The patient's lumbar isometric strength will be tested and they will be placed in a program of isolated strength training based on 50% of their total functional torque and advanced as clinically appropriate.      Directional preference of pain will further influence the patients active rehabilitation program    The patient was instructed there might be an initial increase in discomfort    They are enrolled with good prognosis  Pattern 1      Follow-up: No follow-ups on file. If there are any questions prior to this, the patient was instructed to contact the office.

## 2023-05-03 ENCOUNTER — CLINICAL SUPPORT (OUTPATIENT)
Dept: REHABILITATION | Facility: OTHER | Age: 53
End: 2023-05-03
Payer: COMMERCIAL

## 2023-05-03 DIAGNOSIS — M70.62 TROCHANTERIC BURSITIS OF LEFT HIP: ICD-10-CM

## 2023-05-03 DIAGNOSIS — R29.898 DECREASED STRENGTH OF TRUNK AND BACK: ICD-10-CM

## 2023-05-03 DIAGNOSIS — M54.50 RECURRENT LOW BACK PAIN: ICD-10-CM

## 2023-05-03 PROCEDURE — 97750 PHYSICAL PERFORMANCE TEST: CPT | Mod: 32

## 2023-05-03 NOTE — PLAN OF CARE
[facility] - PHYSICAL THERAPY LUMBAR EVALUATION     Name: Shae Trotter  Clinic Number: [id number]    Therapy Diagnosis:   Encounter Diagnoses   Name Primary?    Recurrent low back pain     Trochanteric bursitis of left hip     Decreased strength of trunk and back      Physician: Tea Breen, *    Physician Orders: PT Eval and Treat   Medical Diagnosis from Referral: M70.62 (ICD-10-CM) - Trochanteric bursitis of left hip , M54.50 (ICD-10-CM) - Recurrent low back pain   Evaluation Date: 5/3/2023  Authorization Period Expiration: 04/23/2024   Plan of Care Expiration: 8/3/2023  Reassessment Due: 6/3/2023  Visit # / Visits authorized: 1/ 1    Time In: 8:05  Time Out: 9:10  Total Billable Time: 65 minutes  INSURANCE and OUTCOMES: Program Benefit Group with Lumbar Outcomes (MARGRET and AQoL)  1/3    Precautions: Standard    Pattern of pain determined: Pattern 1    Subjective   Date of onset/History of current condition: SHAE reports: the initial onset of back pain it was there no accidents or known mechanism of injury. Currently it is a little sore, maybe about 2-3/10 not constant every now and then she will have pain. Does not seem to be associated with a certain activity.  Per MD:  Ms Trotter is a 53 yo female here for evaluation for the healthy back lumbar program.  she has had low back pain for on and off for 5 years it has gradually gotten worse.  Her last flare was one week ago bending to get dog after standing.  It is improving. She will have flares a couple times a year.  The pain is left low back dominant, and across to the right and left glute and left lateral thigh to midthigh. The outer hip pain is more chronic and does not relate to her back flares. The pain is an achy pain. There is no tingling, numbness, burning, or weakness.  The pain is intermittent 0-6/10.  It is worse with bending, sitting, standing, lying on back and stomach, afternoon, evening, and nighttime.  It is better with  walking, climbing stairs, lying on side, with HEP, and morning.  She had local steroid injection 2 years ago with no relief.  She has not done pt, chiropractor or had surgery.  Her goals are prolonged sitting at desk, bending to pick things up, and standing.  Pattern 1     Medical History:   Past Medical History:   Diagnosis Date    Allergy     taking allx shots since 2006    Breast cancer 2006    breast cancer - tx with mastectomy, chemo - followed by Sunny Basurto    Genetic testing 11/2014    BRCA2 deleterious mutation    Other screening mammogram 6/24/2015    Seasonal allergies        Surgical History:   Shae Trotter  has a past surgical history that includes Right breast cysts removed; Left mastectomy and chemo (Left, 2006); D&C hysterscope.; Mastectomy (Left, 2006); tram flap  (Left, 2006); pr removal of ovary/tube(s); Hysterectomy (4/13/15); and Cosmetic surgery.    Medications:   Shea has a current medication list which includes the following prescription(s): cetirizine, epinephrine, levocetirizine, and triamcinolone acetonide 0.1%, and the following Facility-Administered Medications: allergy mix and allergy mix.    Allergies:   Review of patient's allergies indicates:   Allergen Reactions    Fish containing products Anaphylaxis    Aspirin      Other reaction(s): Hives    Avocado Hives     Other reaction(s): Unknown    Fruit extracts Hives     Other reaction(s): Unknown  Allergic to most fruits - hives/swelling    Minocycline Other (See Comments)     dizziness    Penicillins Hives        Imaging: No relevant imaging on file     Prior Therapy: no  Prior Treatment: massage, helps sometimes  Social History:  lives with their spouse Step to enter home  Occupation: , sedentary, works from   Leisure: Gardening (will be stiff after that)      Prior Level of Function: I with ADL  Current Level of Function: I with ADL  DME owned/used: had a seat cushion but no lumbar support for work chair  Gym  Membership: No    Pain:  Current 2-3/10, worst 7/10, best 0/10   Location: low back L>R (but can vary  Description: Sharp, dull, ache  Aggravating Factors: bending, sitting, standing, lying on back and stomach, afternoon, evening, and nighttime  Easing Factors:  ice, heat, tolonol, aleave, stretches  Disturbed Sleep: none    Pattern of pain questions:  1.  Where is your pain the worst? Back  2.  Is your pain constant or intermittent? Intermittent   3.  Does bending forward make your typical pain worse? yes  4.  Since the start of your back pain, has there been a change in your bowel or bladder? no  5.  What can't you do now that you use to be able to do? Reports no difficulty with activities    Pts goals: *not captured*    Red Flag Screening:   Cough/Sneeze  Strain: (--)  Bladder/Bowel: (--)  Falls: (--)  Night pain: (--)  Unexplained weight loss: (--)  General health: good    OBJECTIVE     Postural examination/scapula alignment: Rounded shoulder and L pelvic elevated  Joint integrity: Firm end feeling  Skin integrity: intact  Edema: none noted  Sitting: fair  Standing: fair  Correction of posture: NT    MOVEMENT LOSS - Lumbar   Norms ROM Loss Initial   Flexion Fingers touch toes, sacral angle >/= 70 deg, uniform spinal curvature, posterior weight shift  within functional limits   Extension ASIS surpasses toes, spine of scapulae surpasses heels, uniform spinal curve within functional limits   Side glide Right  minimal loss P! L   Side glide Left  minimal loss P! L   Rotation Right PT observes contralateral shoulder within functional limits some thoracic discomfort   Rotation Left PT observes contralateral shoulder within functional limits some thoracic discomfort     Lower Extremity Strength  Right LE  Left LE    Hip flexion: 4/5 Hip flexion: 4/5   Hip extension:  3+/5 Hip extension: 3+/5   Hip abduction: 4/5 Hip abduction: 4/5   Hip adduction:  4/5 Hip adduction:  4/5   Hip External Rotation 3+/5 Hip External  Rotation 3+/5   Hip Internal rotation   3+/5 Hip Internal rotation 3+/5   Knee Flexion 5/5 Knee Flexion 5/5   Knee Extension 5/5 Knee Extension 5/5   Ankle dorsiflexion: 5/5 Ankle dorsiflexion: 5/5   Ankle plantarflexion: 5/5 Ankle plantarflexion: 5/5     GAIT:  Assistive Device used: none  Level of Assistance: independent  Patient displays the following gait deviations: no gait deviations observed.     Special Tests:   Test Name  Test Result   Prone Instability Test (--)   SI Joint Provocation Test (+) sacral thrust   Straight Leg Raise (--)   Neural Tension Test (+) L   Crossed Straight Leg Raise (--)     L leg appears shorter  L posterior rotated innominate    NEUROLOGICAL SCREENING:     Sensory deficits: Intact to light touch      REPEATED TEST MOVEMENTS:    Baseline symptoms:  Repeated Flexion in Standing no worse, maybe better   Repeated Extension in Standing no effect   Repeated Flexion in lying no effect, feels tight   Repeated Extension in lying  pain during motion  no worse     Posterior pelvic tilt: denies pain  Supine Lower trunk rotation: denies pain    STATIC TESTS and other movements:   Baseline symptoms:  Prone lie no effect   Prone lie on elbows no effect   Sitting slouched  worse, feels pressure   Sitting erect worse, feels tight       Baseline Isometric Testing on Med X equipment: Testing administered by PT    Date of testin/3/2023  ROM 0-60 deg   Max Peak Torque 103    Min Peak Torque 71    Flex/Ext Ratio 1.4   % below normative data 32       Oswestry Questionnaire Review 5/3/2023   Pain Intensity 1   Personal Care (Washing, Dressing) 0   Lifting 1   Walking 0   Sitting 1   Standing 1   Sleeping 1   Social Life 0   Traveling 0   Employment/Homemaking 0   Score 5              Treatment   Treatment Time In: 8:40  Treatment Time Out: 9:10  Total Treatment time separate from Evaluation: 30 minutes      Shae received neuromuscular education to engage spinal musculature correctly for motor  control and engagement of musculature for 15 minutes including the MedX exercise component and practice and standard testing. MedX dynamic exercise and baseline isometric test performed with instructions to guide the patient safely through the testing procedure. Patient instructed to perform isometric test correctly and safely while building to an optimal force with a pain-free effort. Patient also instructed that she should feel support/pressure from MedX restraints but no pain/discomfort. Patient demonstrated appropriate understanding of information.     [facility] Therapy 5/3/2023   Visit Number 1   VAS Pain Rating 3   Lumbar Stretches - Slouch Overcorrection 10   Extension in Lying 10   Extension in Standing 10   Flexion in Lying 10   Lumbar Extension Seat Pad 2   Femur Restraint 6   Top Dead Center 24   Counterweight 129   Lumbar Flexion 60   Lumbar Extension 0   Lumbar Peak Torque 103   Min Torque 71   Test Percent Below Normative Data 32   Ice - Z Lie (in min.) 10         Written Home Exercises Provided: yes.    HEP AS FOLLOWS:    Extension in Lying   Open book  Brace with march    Exercises were reviewed and SÁNCHEZ was able to demonstrate them prior to the end of the session.  SÁNCHEZ demonstrated good  understanding of the education provided.     See EMR under Patient Instructions for exercises provided 5/3/2023.    Therapeutic Education/Activity provided for 15 minutes:   - Patient was given an [facility] Visit 1 handout which discusses the following:  - what to expect in therapy  - an overview of the program, including health coaching and wellness  - importance of spinal hygiene, proper posture, lifting mechanics, sleep quality, and nutrition/hydration   - Clementina roll trialed, recommended, and purchase information was provided.  - Patient received a handout regarding anticipated muscular soreness following the isometric test and strategies for management were reviewed with patient including  stretching, using ice and scheduled rest.   - Patient received verbal education on the following:   - Healthy Back program,   - purpose of the isometric test,   - safe progression of neck strengthening, wellness approach, and systemic strengthening.   - safe usage of MedX machine and testing protocols.    SHAE received cold pack for 10 minutes to low back in Z-lie.    Assessment   Shae is a 52 y.o. female referred to [facility] with a medical diagnosis of Trochanteric bursitis of left hip and Recurrent low back pain. Pt presents with complaints of recurrent low back pain with no apparent cause but this last time she was lifting. She reports that she does not have difficulty with movements and does not limit her self. When she does have a flair she had difficulty with bending, sitting, standing, lying on back and stomach, afternoon, evening, and nighttime. In the evaluation she has good mobility in all cardinal planes with some discomfort with side glides and rotation in the upper back. With related movement in standing and supine she did not have an increase in pain. Pt would benefit from skilled PT to improve strength and stability in the low back.    Pain Pattern: Pattern 1       Pt prognosis is Excellent.     Pt will benefit from skilled outpatient Physical Therapy to address the deficits stated above and in the chart below, to provide pt/family education, and to maximize pt's level of independence. Based on the above history and physical examination an active physical therapy program is recommended.      Plan of care discussed with patient: Yes  Pt's spiritual, cultural and educational needs considered and patient is agreeable to the plan of care and goals as stated below:     Anticipated Barriers for therapy: none    PT Evaluation Completed? Yes    Medical necessity is demonstrated by the following problem list:    History  Co-morbidities and personal factors that may impact the plan of care  Co-morbidities:   history of cancer and prior lumbar surgery    Personal Factors:   lifestyle     low   Examination  Body Structures and Functions, activity limitations and participation restrictions that may impact the plan of care Body Regions:   back  trunk    Body Systems:    gross symmetry  ROM  strength  gross coordinated movement  transfers  transitions  motor control    Participation Restrictions:   None    Activity limitations:   Learning and applying knowledge  no deficits    General Tasks and Commands  no deficits    Communication  no deficits    Mobility  no deficits    Self care  no deficits    Domestic Life  no deficits    Interactions/Relationships  no deficits    Life Areas  no deficits    Community and Social Life  no deficits         low   Clinical Presentation stable and uncomplicated low   Decision Making/ Complexity Score: low       GOALS: Pt is in agreement with the following goals.    Short term goals:  6 weeks or 10 visits     - Pt will demonstrate increased MedX average isometric strength value by 15% from initial test resulting in improved ability to perform bending, lifting, and carrying activities safely, confidently. Appropriate and Ongoing  - Pt will report a reduction in worst pain score by 1-2 points for improved tolerance for lifting and carrying. Appropriate and Ongoing  - Pt able to perform HEP correctly with minimal cueing or supervision from therapist to encourage independent management of symptoms. Appropriate and Ongoing      Long term goals: 10 weeks or 20 visits     - Pt will demonstrate increased MedX average isometric strength value by 30% from initial test resulting in improved ability to perform bending, lifting, and carrying activities safely and confidently. Appropriate and Ongoing  - Pt to demonstrate ability to independently control and reduce their pain through posture positioning and mechanical movements throughout a typical day. Appropriate and Ongoing  - Pt will  "demonstrate reduced pain and improved functional outcomes as reported on the Oswestry Disability Index by reaching a score of 5 or less in order to demonstrate subjective improvement in pt's condition.   Appropriate and Ongoing  - Pt will demonstrate independence with the HEP at discharge. Appropriate and Ongoing  - Pt will be able to lift and carry 15# with proper mechanics and no increase in pain (patient goal) Appropriate and Ongoing      Plan   Outpatient physical therapy 2x week for 10 weeks or 20 visits to include the following:   - Patient education  - Therapeutic exercise  - Manual therapy  - Performance testing   - Neuromuscular Re-education  - Therapeutic activity   - Modalities    Pt may be seen by PTA as part of the rehabilitation team.     Therapist: Eliza Martinez, PT  5/3/2023    "I certify the need for these services furnished under this plan of treatment and while under my care."    ____________________________________  Physician/Referring Practitioner    _______________  Date of Signature         "

## 2023-05-04 ENCOUNTER — TELEPHONE (OUTPATIENT)
Dept: REHABILITATION | Facility: OTHER | Age: 53
End: 2023-05-04
Payer: COMMERCIAL

## 2023-05-04 ENCOUNTER — TELEPHONE (OUTPATIENT)
Dept: ALLERGY | Facility: CLINIC | Age: 53
End: 2023-05-04
Payer: COMMERCIAL

## 2023-05-04 NOTE — TELEPHONE ENCOUNTER
Please see telephone encounter.    ----- Message from Dorothy Campbell sent at 5/4/2023 12:40 PM CDT -----  Regarding: apt  Contact: [phone]  Pt calling in to schedule apt for injection please call discuss Further

## 2023-05-04 NOTE — TELEPHONE ENCOUNTER
Left voicemail in an attempt to follow-up with pt after [facility] physical therapy evaluation, and to also provide information about health coaching offered in the program.

## 2023-05-05 ENCOUNTER — CLINICAL SUPPORT (OUTPATIENT)
Dept: ALLERGY | Facility: CLINIC | Age: 53
End: 2023-05-05
Payer: COMMERCIAL

## 2023-05-05 DIAGNOSIS — J30.9 CHRONIC ALLERGIC RHINITIS: Primary | ICD-10-CM

## 2023-05-05 PROCEDURE — 95117 IMMUNOTHERAPY INJECTIONS: CPT | Mod: S$GLB,,, | Performed by: ALLERGY & IMMUNOLOGY

## 2023-05-05 PROCEDURE — 99999 PR PBB SHADOW E&M-EST. PATIENT-LVL I: CPT | Mod: PBBFAC,,,

## 2023-05-05 PROCEDURE — 99999 PR PBB SHADOW E&M-EST. PATIENT-LVL I: ICD-10-PCS | Mod: PBBFAC,,,

## 2023-05-05 PROCEDURE — 95117 PR IMMU2THERAPY, 2+ INJECTIONS: ICD-10-PCS | Mod: S$GLB,,, | Performed by: ALLERGY & IMMUNOLOGY

## 2023-05-08 ENCOUNTER — RESEARCH ENCOUNTER (OUTPATIENT)
Dept: RESEARCH | Facility: OTHER | Age: 53
End: 2023-05-08
Payer: COMMERCIAL

## 2023-05-24 ENCOUNTER — CLINICAL SUPPORT (OUTPATIENT)
Dept: REHABILITATION | Facility: OTHER | Age: 53
End: 2023-05-24
Payer: COMMERCIAL

## 2023-05-24 DIAGNOSIS — R29.898 DECREASED STRENGTH OF TRUNK AND BACK: Primary | ICD-10-CM

## 2023-05-24 PROCEDURE — 97750 PHYSICAL PERFORMANCE TEST: CPT | Mod: 32

## 2023-05-24 NOTE — PROGRESS NOTES
[facility]  Physical Therapy Treatment Note     Name: Shae Trotter  Clinic Number: [id number]    Therapy Diagnosis:   Encounter Diagnosis   Name Primary?    Decreased strength of trunk and back Yes     Physician: Tea Breen, *    Visit Date: 2023  Physician Orders: PT Eval and Treat   Medical Diagnosis from Referral: M70.62 (ICD-10-CM) - Trochanteric bursitis of left hip , M54.50 (ICD-10-CM) - Recurrent low back pain   Evaluation Date: 5/3/2023  Authorization Period Expiration: 2024   Plan of Care Expiration: 8/3/2023  Reassessment Due: 6/3/2023  Visit # / Visits authorized:      Time In: 3:00pm  Time Out: 3:55pm  Total Billable Time: 55 minutes  INSURANCE and OUTCOMES: Program Benefit Group with Lumbar Outcomes (MARGRET and AQoL)  1/3     Precautions: Standard     Pattern of pain determined: Pattern 1       Subjective     Shae reports she hasn't been consistent with HEP, pain comes at random times. Has also had neck pain in the mornings.     Patient reports tolerating previous visit no adverse effects  Patient reports their pain to be 0/10 on a 0-10 scale with 0 being no pain and 10 being the worst pain imaginable.  Pain Location:     Occupation: , sedentary, works from   Leisure: Gardening (will be stiff after that)    Pt's goals: Less back pain and be able to stand longer    Objective    Baseline Isometric Testing on Med X equipment: Testing administered by PT     Date of testin/3/2023  ROM 0-60 deg   Max Peak Torque 103    Min Peak Torque 71    Flex/Ext Ratio 1.4   % below normative data 32         Oswestry Questionnaire Review 5/3/2023   Pain Intensity 1   Personal Care (Washing, Dressing) 0   Lifting 1   Walking 0   Sitting 1   Standing 1   Sleeping 1   Social Life 0   Traveling 0   Employment/Homemaking 0   Score 5              Treatment     SHAE received the treatments listed below:        SHAE participated in  "neuromuscular re-education activities to improve balance, coordination, proprioception, motor control and/or posture for 10 minutes. The following activities were included:  TrA brace  Brace w/march x10 (not challenging)  +TrA brace + heel slide x10 (not challenging)  +TrA w/ball + SLR x10 (increased lateral R hip pain)  +90/90 heel taps x10       SÁNCHEZ participated in therapeutic exercises to develop  for 45 minutes including:    Extension in Lying 2x10  Open book x10,3"    [facility] - Short 5/24/2023   Visit Number 2   VAS Pain Rating 0   Treadmill Time (in min.) 5   Lumbar Stretches - Slouch -   Extension in Lying 20   Extension in Standing -   Flexion in Lying -   Lumbar Extension - Seat Pad -   Femur Restraint -   Top Dead Center -   Counterweight -   Lumbar Flexion -   Lumbar Extension -   Lumbar Peak Torque -   Lumbar Weight 50   Repetitions 13   Rating of Perceived Exertion 4        Peripheral muscle strengthening which included one set of 15-20 repetitions at a slow and controlled 10-13 second per rep pace focused on strengthening supporting musculature in order to improve body mechanics and functional mobility. Patient and therapist focused on proper form during treatment to ensure optimal strengthening of each targeted muscle group.  Machines utilized include torso rotation, leg extension, leg curl, chest press, upright row. Tricep extension, bicep curl, leg press, and hip abduction added visit 3    SÁNCHEZ participated in dynamic functional therapeutic activities to improve functional performance and simulate household and community activities for minutes. The following activities were included:        SÁNCHEZ received manual therapy techniques for   minutes. The following activities were included:        Pt given cold pack for 5 minutes to  in z-lie    Patient Education and Home Exercises     Home exercises include:    Cardio program (V5): -  Lifting education (V11): -  Posture/Lumbar roll: " Obtained  [facility] Discharge handout (date given): -  Equipment at home/gym membership:     Education provided:   - PT role and POC  - HEP  -Sleeping posture to decrease c/s load    Written Home Exercises Provided: Patient instructed to cont prior HEP.  Exercises were reviewed and SÁNCHEZ was able to demonstrate them prior to the end of the session.  SÁNCHEZ demonstrated good  understanding of the education provided.     See EMR under Patient Instructions for exercises provided prior visit.    Assessment   Pt presents to second healthy back visit reporting no back pain, was able to demo HEP with Min VC for form. Pt was able to start strengthening and endurance training on the lumbar MedX at 50% of max peak torque according to the initial visit isometric test. Pt was able to complete 13 reps, with 4/10. RPE. Pt was also able to complete half of the peripheral strengthening exercises without increased discomfort and will complete the complete circuit next visit as tolerated.       Patient is making good progress towards established goals.  Pt will continue to benefit from skilled outpatient physical therapy to address the deficits stated in the impairment chart, provide pt/family education and to maximize pt's level of independence in the home and community environment.     Anticipated Barriers for therapy: None  Pt's spiritual, cultural and educational needs considered and pt agreeable to plan of care and goals as stated below:     Goals: Pt is in agreement with the following goals.     Short term goals:  6 weeks or 10 visits      - Pt will demonstrate increased MedX average isometric strength value by 15% from initial test resulting in improved ability to perform bending, lifting, and carrying activities safely, confidently. Appropriate and Ongoing  - Pt will report a reduction in worst pain score by 1-2 points for improved tolerance for lifting and carrying. Appropriate and Ongoing  - Pt able to perform HEP  correctly with minimal cueing or supervision from therapist to encourage independent management of symptoms. Appropriate and Ongoing        Long term goals: 10 weeks or 20 visits      - Pt will demonstrate increased MedX average isometric strength value by 30% from initial test resulting in improved ability to perform bending, lifting, and carrying activities safely and confidently. Appropriate and Ongoing  - Pt to demonstrate ability to independently control and reduce their pain through posture positioning and mechanical movements throughout a typical day. Appropriate and Ongoing  - Pt will demonstrate reduced pain and improved functional outcomes as reported on the Oswestry Disability Index by reaching a score of 5 or less in order to demonstrate subjective improvement in pt's condition.   Appropriate and Ongoing  - Pt will demonstrate independence with the HEP at discharge. Appropriate and Ongoing  - Pt will be able to lift and carry 15# with proper mechanics and no increase in pain (patient goal) Appropriate and Ongoing    Plan   Outpatient physical therapy 2x week for 10 weeks or 20 visits to include the following:   - Patient education  - Therapeutic exercise  - Manual therapy  - Performance testing   - Neuromuscular Re-education  - Therapeutic activity   - Modalities     Pt may be seen by PTA as part of the rehabilitation team.     Therapist: Zeenat Jean-Baptiste PTA  5/24/2023

## 2023-05-26 ENCOUNTER — CLINICAL SUPPORT (OUTPATIENT)
Dept: REHABILITATION | Facility: OTHER | Age: 53
End: 2023-05-26
Payer: COMMERCIAL

## 2023-05-26 DIAGNOSIS — R29.898 DECREASED STRENGTH OF TRUNK AND BACK: Primary | ICD-10-CM

## 2023-05-26 PROCEDURE — 97750 PHYSICAL PERFORMANCE TEST: CPT | Mod: 32

## 2023-05-26 NOTE — PROGRESS NOTES
[facility]  Physical Therapy Treatment Note     Name: Shae Trotter  Clinic Number: [id number]    Therapy Diagnosis:   Encounter Diagnosis   Name Primary?    Decreased strength of trunk and back Yes       Physician: Tea Breen, *    Visit Date: 2023  Physician Orders: PT Eval and Treat   Medical Diagnosis from Referral: M70.62 (ICD-10-CM) - Trochanteric bursitis of left hip , M54.50 (ICD-10-CM) - Recurrent low back pain   Evaluation Date: 5/3/2023  Authorization Period Expiration: 2024   Plan of Care Expiration: 8/3/2023  Reassessment Due: 6/3/2023  Visit # / Visits authorized: 3/20     Time In: 8:30am  Time Out: 9:30am  Total Billable Time: 55 minutes  INSURANCE and OUTCOMES: Program Benefit Group with Lumbar Outcomes (MARGRET and AQoL)  1/3     Precautions: Standard     Pattern of pain determined: Pattern 1     Subjective     Shae reports she woke up with her usual back pain this morning, but it comes and goes randomly. Did HEP this morning with no lasting change in pain.    Patient reports tolerating previous visit no adverse effects  Patient reports their pain to be 3/10 on a 0-10 scale with 0 being no pain and 10 being the worst pain imaginable.  Pain Location:     Occupation: , sedentary, works from   Leisure: Gardening (will be stiff after that)    Pt's goals: Less back pain and be able to stand longer    Objective    Baseline Isometric Testing on Med X equipment: Testing administered by PT     Date of testin/3/2023  ROM 0-60 deg   Max Peak Torque 103    Min Peak Torque 71    Flex/Ext Ratio 1.4   % below normative data 32         Oswestry Questionnaire Review 5/3/2023   Pain Intensity 1   Personal Care (Washing, Dressing) 0   Lifting 1   Walking 0   Sitting 1   Standing 1   Sleeping 1   Social Life 0   Traveling 0   Employment/Homemaking 0   Score 5              Treatment     SHAE received the treatments listed below:   "      SÁNCHEZ participated in neuromuscular re-education activities to improve balance, coordination, proprioception, motor control and/or posture for 5 minutes. The following activities were included:  +90/90 heel taps 2x10       SÁNCHEZ participated in therapeutic exercises to develop  for 45 minutes including:  +LTR x10,3"  +B Supine hip flexor stretch 1 min  +HS stretch 2x30 (very limited LLE)  +B figure-4 x30"  +kneeling hip flexor stretch x30"  +Bridge w/pilates ring 2x10,3"  Extension in Lying 2x10  Open book x10,3"    [facility] Short 5/26/2023   Visit Number 3   VAS Pain Rating 2   Treadmill Time (in min.) 5   Lumbar Stretches - Slouch -   Extension in Lying -   Extension in Standing -   Flexion in Lying -   Lumbar Extension - Seat Pad -   Femur Restraint -   Top Dead Center -   Counterweight -   Lumbar Flexion -   Lumbar Extension -   Lumbar Peak Torque -   Lumbar Weight 50   Repetitions 18   Rating of Perceived Exertion 4        NP:         Peripheral muscle strengthening which included one set of 15-20 repetitions at a slow and controlled 10-13 second per rep pace focused on strengthening supporting musculature in order to improve body mechanics and functional mobility. Patient and therapist focused on proper form during treatment to ensure optimal strengthening of each targeted muscle group.  Machines utilized include torso rotation, leg extension, leg curl, chest press, upright row. Tricep extension, bicep curl, leg press, and hip abduction added visit 3    SÁNCHEZ participated in dynamic functional therapeutic activities to improve functional performance and simulate household and community activities for 5 minutes. The following activities were included:  Education and handout on sleep positions provided  Education on utilizing deep core when bending/lifting      Pt given cold pack for 5 minutes to  in z-lie    Patient Education and Home Exercises     Home exercises include:  Open books, PPU, " ab brace, hip flexor stretch  Cardio program (V5): -  Lifting education (V11): -  Posture/Lumbar roll: Obtained  [employer] Discharge handout (date given): -  Equipment at home/gym membership:     Education provided:   - PT role and POC  - HEP  -Sleeping posture to decrease c/s load    Written Home Exercises Provided: Patient instructed to cont prior HEP.  Exercises were reviewed and SÁNCHEZ was able to demonstrate them prior to the end of the session.  SÁNCHEZ demonstrated good  understanding of the education provided.     See EMR under Patient Instructions for exercises provided prior visit.    Assessment   Pt presents to PT reporting she woke up with increased back pain. Discussed sleeping positions to ensure neutral spinal position and added hip flexor stretch to address prolonged sitting hours at work. Provided ergonomic stretch handout to perform at work. Patient able to perform 18 reps on l/s medx machine with an RPE of 4/10. Patient able to perform 18 reps on l/s medx machine with an RPE of 4/10.  Will continue to progress per HB program.       Patient is making good progress towards established goals.  Pt will continue to benefit from skilled outpatient physical therapy to address the deficits stated in the impairment chart, provide pt/family education and to maximize pt's level of independence in the home and community environment.     Anticipated Barriers for therapy: None  Pt's spiritual, cultural and educational needs considered and pt agreeable to plan of care and goals as stated below:     Goals: Pt is in agreement with the following goals.     Short term goals:  6 weeks or 10 visits      - Pt will demonstrate increased MedX average isometric strength value by 15% from initial test resulting in improved ability to perform bending, lifting, and carrying activities safely, confidently. Appropriate and Ongoing  - Pt will report a reduction in worst pain score by 1-2 points for improved tolerance for  lifting and carrying. Appropriate and Ongoing  - Pt able to perform HEP correctly with minimal cueing or supervision from therapist to encourage independent management of symptoms. Appropriate and Ongoing        Long term goals: 10 weeks or 20 visits      - Pt will demonstrate increased MedX average isometric strength value by 30% from initial test resulting in improved ability to perform bending, lifting, and carrying activities safely and confidently. Appropriate and Ongoing  - Pt to demonstrate ability to independently control and reduce their pain through posture positioning and mechanical movements throughout a typical day. Appropriate and Ongoing  - Pt will demonstrate reduced pain and improved functional outcomes as reported on the Oswestry Disability Index by reaching a score of 5 or less in order to demonstrate subjective improvement in pt's condition.   Appropriate and Ongoing  - Pt will demonstrate independence with the HEP at discharge. Appropriate and Ongoing  - Pt will be able to lift and carry 15# with proper mechanics and no increase in pain (patient goal) Appropriate and Ongoing    Plan   Outpatient physical therapy 2x week for 10 weeks or 20 visits to include the following:   - Patient education  - Therapeutic exercise  - Manual therapy  - Performance testing   - Neuromuscular Re-education  - Therapeutic activity   - Modalities     Pt may be seen by PTA as part of the rehabilitation team.     Therapist: Zeenat Jean-Baptiste PTA  5/26/2023

## 2023-05-30 ENCOUNTER — CLINICAL SUPPORT (OUTPATIENT)
Dept: ALLERGY | Facility: CLINIC | Age: 53
End: 2023-05-30
Payer: COMMERCIAL

## 2023-05-30 DIAGNOSIS — J30.9 CHRONIC ALLERGIC RHINITIS: Primary | ICD-10-CM

## 2023-05-30 PROCEDURE — 95117 IMMUNOTHERAPY INJECTIONS: CPT | Mod: S$GLB,,, | Performed by: ALLERGY & IMMUNOLOGY

## 2023-05-30 PROCEDURE — 99999 PR PBB SHADOW E&M-EST. PATIENT-LVL I: ICD-10-PCS | Mod: PBBFAC,,,

## 2023-05-30 PROCEDURE — 95117 PR IMMU2THERAPY, 2+ INJECTIONS: ICD-10-PCS | Mod: S$GLB,,, | Performed by: ALLERGY & IMMUNOLOGY

## 2023-05-30 PROCEDURE — 99999 PR PBB SHADOW E&M-EST. PATIENT-LVL I: CPT | Mod: PBBFAC,,,

## 2023-06-01 ENCOUNTER — CLINICAL SUPPORT (OUTPATIENT)
Dept: REHABILITATION | Facility: OTHER | Age: 53
End: 2023-06-01
Payer: COMMERCIAL

## 2023-06-01 DIAGNOSIS — R29.898 DECREASED STRENGTH OF TRUNK AND BACK: Primary | ICD-10-CM

## 2023-06-01 PROCEDURE — 97750 PHYSICAL PERFORMANCE TEST: CPT | Mod: 32

## 2023-06-01 NOTE — PROGRESS NOTES
[facility]  Physical Therapy Treatment Note     Name: Shae Trotter  Clinic Number: [id number]    Therapy Diagnosis:   Encounter Diagnosis   Name Primary?    Decreased strength of trunk and back Yes       Physician: Tea Breen, *    Visit Date: 2023  Physician Orders: PT Eval and Treat   Medical Diagnosis from Referral: M70.62 (ICD-10-CM) - Trochanteric bursitis of left hip , M54.50 (ICD-10-CM) - Recurrent low back pain   Evaluation Date: 5/3/2023  Authorization Period Expiration: 2024   Plan of Care Expiration: 8/3/2023  Reassessment Due: 6/3/2023  Visit # / Visits authorized:      Time In: 3:32  Time Out: 4:41  Total Billable Time: 69 minutes  INSURANCE and OUTCOMES: Program Benefit Group with Lumbar Outcomes (MARGRET and AQoL)  1/3     Precautions: Standard     Pattern of pain determined: Pattern 1 PEN     Subjective     Shae reports she had an increased in discomfort in the upper and lower back that started around . She has used some topicals and did the stretches but no change. She took aleve this morning and the pain right now is about 4/10    Patient reports tolerating previous visit no adverse effects  Patient reports their pain to be 3/10 on a 0-10 scale with 0 being no pain and 10 being the worst pain imaginable.  Pain Location:     Occupation: , sedentary, works from   Leisure: Gardening (will be stiff after that)    Pt's goals: Less back pain and be able to stand longer    Objective    Baseline Isometric Testing on Med X equipment: Testing administered by PT     Date of testin/3/2023  ROM 0-60 deg   Max Peak Torque 103    Min Peak Torque 71    Flex/Ext Ratio 1.4   % below normative data 32         Oswestry Questionnaire Review 5/3/2023   Pain Intensity 1   Personal Care (Washing, Dressing) 0   Lifting 1   Walking 0   Sitting 1   Standing 1   Sleeping 1   Social Life 0   Traveling 0   Employment/Homemaking 0  "  Score 5              Treatment     SÁNCHEZ received the treatments listed below:        SÁNCHEZ participated in neuromuscular re-education activities to improve balance, coordination, proprioception, motor control and/or posture for 5 minutes. The following activities were included:  +90/90 heel taps 2x10       SÁNCHEZ participated in therapeutic exercises to develop  for 45 minutes including:    Double knee to chest 10x2  LTR/QL stretch x10,3" (one side at a time)  HS stretch 2x30   B figure-4 2x30"  Open book x10,3"  METs for anterior rotated L innominate    [facility] 6/1/2023   Visit Number 4   VAS Pain Rating 4   Treadmill Time (in min.) 5   Lumbar Stretches - Slouch Overcorrection -   Extension in Lying -   Extension in Standing -   Flexion in Lying 20   Manual Therapy 5   Lumbar Extension Seat Pad -   Femur Restraint -   Top Dead Center -   Counterweight -   Lumbar Flexion -   Lumbar Extension -   Lumbar Peak Torque -   Min Torque -   Test Percent Below Normative Data -   Lumbar Weight 50   Repetitions 20   Rating of Perceived Exertion 5   Ice - Z Lie (in min.) -          NP:  B Supine hip flexor stretch  kneeling hip flexor stretch x30"  Bridge w/pilates ring 2x10,3"  Extension in Lying 2x10           Peripheral muscle strengthening which included one set of 15-20 repetitions at a slow and controlled 10-13 second per rep pace focused on strengthening supporting musculature in order to improve body mechanics and functional mobility. Patient and therapist focused on proper form during treatment to ensure optimal strengthening of each targeted muscle group.  Machines utilized include torso rotation, leg extension, leg curl, chest press, upright row. Tricep extension, bicep curl, leg press, and hip abduction added visit 3    SÁNCHEZ participated in dynamic functional therapeutic activities to improve functional performance and simulate household and community activities for 5 minutes. The following " activities were included:  Education and handout on sleep positions provided  Education on utilizing deep core when bending/lifting      Pt given cold pack for 5 minutes to  in z-lie    Patient Education and Home Exercises     Home exercises include:  Open books, PPU, ab brace, hip flexor stretch  Cardio program (V5): -  Lifting education (V11): -  Posture/Lumbar roll: Obtained  [employer] Discharge handout (date given): -  Equipment at home/gym membership:     Education provided:   - PT role and POC  - HEP  -Sleeping posture to decrease c/s load    Written Home Exercises Provided: Patient instructed to cont prior HEP.  Exercises were reviewed and SÁNCHEZ was able to demonstrate them prior to the end of the session.  SÁNCHEZ demonstrated good  understanding of the education provided.     See EMR under Patient Instructions for exercises provided prior visit.    Assessment   Pt presents to PT reporting she woke up with increased back pain. Complete cardinal plane flexion and extension and she had more pain with extension followed by STM to the lumbar paraspinals in prone reporting feels a disomfort at the QL region on the L side, she had decreased ROM in cardinal plane flexion. Completed DKTC followed by cardinal plane flexion and she had improved motion. Pt is a flexion movement responder. Completed QL stretch with no change in pull but reports it feels like a good stretch, did not complete strengthening exercises prior to getting on the MEDx but she felt a little more loose and better post stretches. Patient able to perform 20 reps on l/s medx machine with an RPE of 5/10. Will continue to progress per HB program.       Patient is making good progress towards established goals.  Pt will continue to benefit from skilled outpatient physical therapy to address the deficits stated in the impairment chart, provide pt/family education and to maximize pt's level of independence in the home and community environment.      Anticipated Barriers for therapy: None  Pt's spiritual, cultural and educational needs considered and pt agreeable to plan of care and goals as stated below:     Goals: Pt is in agreement with the following goals.     Short term goals:  6 weeks or 10 visits      - Pt will demonstrate increased MedX average isometric strength value by 15% from initial test resulting in improved ability to perform bending, lifting, and carrying activities safely, confidently. Appropriate and Ongoing  - Pt will report a reduction in worst pain score by 1-2 points for improved tolerance for lifting and carrying. Appropriate and Ongoing  - Pt able to perform HEP correctly with minimal cueing or supervision from therapist to encourage independent management of symptoms. Appropriate and Ongoing        Long term goals: 10 weeks or 20 visits      - Pt will demonstrate increased MedX average isometric strength value by 30% from initial test resulting in improved ability to perform bending, lifting, and carrying activities safely and confidently. Appropriate and Ongoing  - Pt to demonstrate ability to independently control and reduce their pain through posture positioning and mechanical movements throughout a typical day. Appropriate and Ongoing  - Pt will demonstrate reduced pain and improved functional outcomes as reported on the Oswestry Disability Index by reaching a score of 5 or less in order to demonstrate subjective improvement in pt's condition.   Appropriate and Ongoing  - Pt will demonstrate independence with the HEP at discharge. Appropriate and Ongoing  - Pt will be able to lift and carry 15# with proper mechanics and no increase in pain (patient goal) Appropriate and Ongoing    Plan   Outpatient physical therapy 2x week for 10 weeks or 20 visits to include the following:   - Patient education  - Therapeutic exercise  - Manual therapy  - Performance testing   - Neuromuscular Re-education  - Therapeutic activity   -  Modalities     Pt may be seen by PTA as part of the rehabilitation team.     Therapist: Eliza Martinez, PT  6/1/2023

## 2023-06-06 ENCOUNTER — CLINICAL SUPPORT (OUTPATIENT)
Dept: REHABILITATION | Facility: OTHER | Age: 53
End: 2023-06-06
Payer: COMMERCIAL

## 2023-06-06 DIAGNOSIS — R29.898 DECREASED STRENGTH OF TRUNK AND BACK: Primary | ICD-10-CM

## 2023-06-06 PROCEDURE — 97750 PHYSICAL PERFORMANCE TEST: CPT | Mod: 32

## 2023-06-06 NOTE — PROGRESS NOTES
[facility]  Physical Therapy Treatment Note     Name: Shae Trotter  Clinic Number: [id number]    Therapy Diagnosis:   Encounter Diagnosis   Name Primary?    Decreased strength of trunk and back Yes       Physician: Tea Breen, *    Visit Date: 2023  Physician Orders: PT Eval and Treat   Medical Diagnosis from Referral: M70.62 (ICD-10-CM) - Trochanteric bursitis of left hip , M54.50 (ICD-10-CM) - Recurrent low back pain   Evaluation Date: 5/3/2023  Authorization Period Expiration: 2024   Plan of Care Expiration: 8/3/2023  Reassessment Due: 6/3/2023  Visit # / Visits authorized:      Time In: 3:30 pm  Time Out: 4:30 pm  Total Billable Time: 55 minutes  INSURANCE and OUTCOMES: Program Benefit Group with Lumbar Outcomes (MARGRET and AQoL)  1/3     Precautions: Standard     Pattern of pain determined: Pattern 1 PEN     Subjective     Shae reports no lower back discomfort currently. She did experience increased muscular soreness following last visit but was able to relieve soreness with HEP stretches.    Patient reports tolerating previous visit no adverse effects  Patient reports their pain to be 0/10 on a 0-10 scale with 0 being no pain and 10 being the worst pain imaginable.  Pain Location:     Occupation: , sedentary, works from   Leisure: Gardening (will be stiff after that)    Pt's goals: Less back pain and be able to stand longer    Objective    Baseline Isometric Testing on Med X equipment: Testing administered by PT     Date of testin/3/2023  ROM 0-60 deg   Max Peak Torque 103    Min Peak Torque 71    Flex/Ext Ratio 1.4   % below normative data 32         Oswestry Questionnaire Review 5/3/2023   Pain Intensity 1   Personal Care (Washing, Dressing) 0   Lifting 1   Walking 0   Sitting 1   Standing 1   Sleeping 1   Social Life 0   Traveling 0   Employment/Homemaking 0   Score 5              Treatment     SHAE received the  "treatments listed below:        SÁNCHEZ participated in neuromuscular re-education activities to improve balance, coordination, proprioception, motor control and/or posture for 5 minutes. The following activities were included:  90/90 heel taps 2x10   +PPT x15 5" hold      SÁNCHEZ participated in therapeutic exercises to develop  for 50 minutes including:    Double knee to chest 10x2  LTR/QL stretch x10,3" (one side at a time)  HS stretch 2x30   B figure-4 2x30"  Open book x10,3"  Bridge w/pilates ring 2x10,3"  METs for anterior rotated L innominate  [facility] 6/6/2023   Visit Number 5   VAS Pain Rating 0   Treadmill Time (in min.) 5   Lumbar Stretches - Slouch -   Extension in Lying -   Extension in Standing -   Flexion in Lying 20   Manual Therapy -   Lumbar Extension - Seat Pad -   Femur Restraint -   Top Dead Center -   Counterweight -   Lumbar Flexion -   Lumbar Extension -   Lumbar Peak Torque -   Lumbar Weight 54   Repetitions 15   Rating of Perceived Exertion 3         NP:  B Supine hip flexor stretch  kneeling hip flexor stretch x30"  Extension in Lying 2x10           Peripheral muscle strengthening which included one set of 15-20 repetitions at a slow and controlled 10-13 second per rep pace focused on strengthening supporting musculature in order to improve body mechanics and functional mobility. Patient and therapist focused on proper form during treatment to ensure optimal strengthening of each targeted muscle group.  Machines utilized include torso rotation, leg extension, leg curl, chest press, upright row. Tricep extension, bicep curl, leg press, and hip abduction added visit 3    SÁNCHEZ participated in dynamic functional therapeutic activities to improve functional performance and simulate household and community activities for 0 minutes. The following activities were included:  Education and handout on sleep positions provided  Education on utilizing deep core when " bending/lifting      Pt given cold pack for 5 minutes to  in z-lie    Patient Education and Home Exercises     Home exercises include:  Open books, PPU, ab brace, hip flexor stretch  Cardio program (V5): -  Lifting education (V11): -  Posture/Lumbar roll: Obtained  [employer] Discharge handout (date given): -  Equipment at home/gym membership:     Education provided:   - PT role and POC  - HEP  -Sleeping posture to decrease c/s load    Written Home Exercises Provided: Patient instructed to cont prior HEP.  Exercises were reviewed and SHAE was able to demonstrate them prior to the end of the session.  SHAE demonstrated good  understanding of the education provided.     See EMR under Patient Instructions for exercises provided prior visit.    Assessment   Shae returned without complaints of lower back discomfort. Treatment continued with lumbopelvic mobility and core stability ex's, progressing by adding PPT to continue to challenge deep core musculature activation. Mod Verbal and tactile cues required to increase TA activation with PPT. Cardio handout issued and explained to pt with shae stating she has a treadmill at home and will attempt to walk at least 3 days a week. Medx resistance increased to 54#. She completed 15 reps at a RPE of 3/10. Will continue to progress per pt's tolerance and HB protocol.      Patient is making good progress towards established goals.  Pt will continue to benefit from skilled outpatient physical therapy to address the deficits stated in the impairment chart, provide pt/family education and to maximize pt's level of independence in the home and community environment.     Anticipated Barriers for therapy: None  Pt's spiritual, cultural and educational needs considered and pt agreeable to plan of care and goals as stated below:     Goals: Pt is in agreement with the following goals.     Short term goals:  6 weeks or 10 visits      - Pt will demonstrate increased MedX average  isometric strength value by 15% from initial test resulting in improved ability to perform bending, lifting, and carrying activities safely, confidently. Appropriate and Ongoing  - Pt will report a reduction in worst pain score by 1-2 points for improved tolerance for lifting and carrying. Appropriate and Ongoing  - Pt able to perform HEP correctly with minimal cueing or supervision from therapist to encourage independent management of symptoms. Appropriate and Ongoing        Long term goals: 10 weeks or 20 visits      - Pt will demonstrate increased MedX average isometric strength value by 30% from initial test resulting in improved ability to perform bending, lifting, and carrying activities safely and confidently. Appropriate and Ongoing  - Pt to demonstrate ability to independently control and reduce their pain through posture positioning and mechanical movements throughout a typical day. Appropriate and Ongoing  - Pt will demonstrate reduced pain and improved functional outcomes as reported on the Oswestry Disability Index by reaching a score of 5 or less in order to demonstrate subjective improvement in pt's condition.   Appropriate and Ongoing  - Pt will demonstrate independence with the HEP at discharge. Appropriate and Ongoing  - Pt will be able to lift and carry 15# with proper mechanics and no increase in pain (patient goal) Appropriate and Ongoing    Plan   Outpatient physical therapy 2x week for 10 weeks or 20 visits to include the following:   - Patient education  - Therapeutic exercise  - Manual therapy  - Performance testing   - Neuromuscular Re-education  - Therapeutic activity   - Modalities     Pt may be seen by PTA as part of the rehabilitation team.     Therapist: Liam Rubin PTA  6/6/2023

## 2023-06-08 ENCOUNTER — CLINICAL SUPPORT (OUTPATIENT)
Dept: REHABILITATION | Facility: OTHER | Age: 53
End: 2023-06-08
Payer: COMMERCIAL

## 2023-06-08 DIAGNOSIS — R29.898 DECREASED STRENGTH OF TRUNK AND BACK: Primary | ICD-10-CM

## 2023-06-08 PROCEDURE — 97750 PHYSICAL PERFORMANCE TEST: CPT | Mod: 32

## 2023-06-08 NOTE — PROGRESS NOTES
[facility]  Physical Therapy Treatment Note     Name: Shae Trotter  Clinic Number: [id number]    Therapy Diagnosis:   Encounter Diagnosis   Name Primary?    Decreased strength of trunk and back Yes         Physician: Tea Breen, *    Visit Date: 2023  Physician Orders: PT Eval and Treat   Medical Diagnosis from Referral: M70.62 (ICD-10-CM) - Trochanteric bursitis of left hip , M54.50 (ICD-10-CM) - Recurrent low back pain   Evaluation Date: 5/3/2023  Authorization Period Expiration: 2024   Plan of Care Expiration: 8/3/2023  Reassessment Due: 6/3/2023  Visit # / Visits authorized:      Time In: 3:35 pm  Time Out: 4:30 pm  Total Billable Time: 55 minutes  INSURANCE and OUTCOMES: Program Benefit Group with Lumbar Outcomes (MARGRET and AQoL)  1/3     Precautions: Standard     Pattern of pain determined: Pattern 1 PEN     Subjective     Shae reports no lower back discomfort currently. She did experience increased muscular soreness following last visit but was able to relieve soreness with HEP stretches.    Patient reports tolerating previous visit no adverse effects  Patient reports their pain to be 0/10 on a 0-10 scale with 0 being no pain and 10 being the worst pain imaginable.  Pain Location:     Occupation: , sedentary  Leisure: Gardening (will be stiff after that)    Pt's goals: Less back pain and be able to stand longer    Objective    Baseline Isometric Testing on Med X equipment: Testing administered by PT     Date of testin/3/2023  ROM 0-60 deg   Max Peak Torque 103    Min Peak Torque 71    Flex/Ext Ratio 1.4   % below normative data 32         Oswestry Questionnaire Review 5/3/2023   Pain Intensity 1   Personal Care (Washing, Dressing) 0   Lifting 1   Walking 0   Sitting 1   Standing 1   Sleeping 1   Social Life 0   Traveling 0   Employment/Homemaking 0   Score 5              Treatment     SHAE received the treatments  "listed below:        SÁNCHEZ participated in neuromuscular re-education activities to improve balance, coordination, proprioception, motor control and/or posture for 0 minutes. The following activities were included:    SÁNCHEZ participated in therapeutic exercises to develop  for 50 minutes including:    Double knee to chest 10x2  LTR/QL stretch x10,3" (one side at a time)  HS stretch 2x30   B figure-4 2x30"  Open book x10,3"  Bridge w/pilates ring 2x10,3"  METs for anterior rotated L innominate  90/90 heel taps 2x10   PPT x15 5" hold        NP:  B Supine hip flexor stretch  kneeling hip flexor stretch x30"  Extension in Lying 2x10    [facility] 6/8/2023   Visit Number 6   VAS Pain Rating 0   Treadmill Time (in min.) 5   Lumbar Stretches - Slouch Overcorrection -   Extension in Lying -   Extension in Standing -   Flexion in Lying 20   Manual Therapy -   Lumbar Extension Seat Pad -   Femur Restraint -   Top Dead Center -   Counterweight -   Lumbar Flexion -   Lumbar Extension -   Lumbar Peak Torque -   Min Torque -   Test Percent Below Normative Data -   Lumbar Weight 54   Repetitions 18   Rating of Perceived Exertion 4   Ice - Z Lie (in min.) 5            Peripheral muscle strengthening which included one set of 15-20 repetitions at a slow and controlled 10-13 second per rep pace focused on strengthening supporting musculature in order to improve body mechanics and functional mobility. Patient and therapist focused on proper form during treatment to ensure optimal strengthening of each targeted muscle group.  Machines utilized include torso rotation, leg extension, leg curl, chest press, upright row. Tricep extension, bicep curl, leg press, and hip abduction added visit 3    SÁNCHEZ participated in dynamic functional therapeutic activities to improve functional performance and simulate household and community activities for 0 minutes. The following activities were included:  Education and handout on sleep " positions provided  Education on utilizing deep core when bending/lifting      Pt given cold pack for 5 minutes to  in z-lie    Patient Education and Home Exercises     Home exercises include:  Open books, PPU, ab brace, hip flexor stretch  Cardio program (V5): -  Lifting education (V11): -  Posture/Lumbar roll: Obtained  Fridge Magnet Discharge handout (date given): -  Equipment at home/gym membership:     Education provided:   - PT role and POC  - HEP  -Sleeping posture to decrease c/s load    Written Home Exercises Provided: Patient instructed to cont prior HEP.  Exercises were reviewed and SHAE was able to demonstrate them prior to the end of the session.  SHAE demonstrated good  understanding of the education provided.     See EMR under Patient Instructions for exercises provided prior visit.    Assessment   Shae returned without complaints of lower back discomfort. Treatment continued with lumbopelvic mobility and core stability ex's, progressing by adding PPT to continue to challenge deep core musculature activation. Lumbar Medx resistance remained at 54ft/lbs. She completed 16 reps at a RPE of 5/10. On the medx machine, pt reported feeling pain in LB assessed pelvic alignment prior to initiating peripheral machines. noted to have R anterior/L posterior innominate rotation which was corrected with MET, and pt reported decreased pain levels. Progress per HB protocol and pt's tolerance.     Patient is making good progress towards established goals.  Pt will continue to benefit from skilled outpatient physical therapy to address the deficits stated in the impairment chart, provide pt/family education and to maximize pt's level of independence in the home and community environment.     Anticipated Barriers for therapy: None  Pt's spiritual, cultural and educational needs considered and pt agreeable to plan of care and goals as stated below:     Goals: Pt is in agreement with the following goals.     Short  term goals:  6 weeks or 10 visits      - Pt will demonstrate increased MedX average isometric strength value by 15% from initial test resulting in improved ability to perform bending, lifting, and carrying activities safely, confidently. Appropriate and Ongoing  - Pt will report a reduction in worst pain score by 1-2 points for improved tolerance for lifting and carrying. Appropriate and Ongoing  - Pt able to perform HEP correctly with minimal cueing or supervision from therapist to encourage independent management of symptoms. Appropriate and Ongoing        Long term goals: 10 weeks or 20 visits      - Pt will demonstrate increased MedX average isometric strength value by 30% from initial test resulting in improved ability to perform bending, lifting, and carrying activities safely and confidently. Appropriate and Ongoing  - Pt to demonstrate ability to independently control and reduce their pain through posture positioning and mechanical movements throughout a typical day. Appropriate and Ongoing  - Pt will demonstrate reduced pain and improved functional outcomes as reported on the Oswestry Disability Index by reaching a score of 5 or less in order to demonstrate subjective improvement in pt's condition.   Appropriate and Ongoing  - Pt will demonstrate independence with the HEP at discharge. Appropriate and Ongoing  - Pt will be able to lift and carry 15# with proper mechanics and no increase in pain (patient goal) Appropriate and Ongoing    Plan   Outpatient physical therapy 2x week for 10 weeks or 20 visits to include the following:   - Patient education  - Therapeutic exercise  - Manual therapy  - Performance testing   - Neuromuscular Re-education  - Therapeutic activity   - Modalities     Pt may be seen by PTA as part of the rehabilitation team.     Therapist: Yumiko Mar PTA  6/8/2023

## 2023-06-12 ENCOUNTER — CLINICAL SUPPORT (OUTPATIENT)
Dept: REHABILITATION | Facility: OTHER | Age: 53
End: 2023-06-12
Payer: COMMERCIAL

## 2023-06-12 DIAGNOSIS — R29.898 DECREASED STRENGTH OF TRUNK AND BACK: Primary | ICD-10-CM

## 2023-06-12 PROCEDURE — 97750 PHYSICAL PERFORMANCE TEST: CPT | Mod: 32

## 2023-06-12 NOTE — PROGRESS NOTES
[facility] OUTPATIENT THERAPY AND WELLNESS - [facility]  Physical Therapy Treatment Note     Name: Shae Trotter  Clinic Number: [id number]    Therapy Diagnosis:   Encounter Diagnosis   Name Primary?    Decreased strength of trunk and back Yes       Physician: Tea Breen, *    Visit Date: 2023  Physician Orders: PT Eval and Treat   Medical Diagnosis from Referral: M70.62 (ICD-10-CM) - Trochanteric bursitis of left hip , M54.50 (ICD-10-CM) - Recurrent low back pain     Evaluation Date: 5/3/2023  Authorization Period Expiration: 2024   Plan of Care Expiration: 8/3/2023  Reassessment Due: 2023  Visit # / Visits authorized:      Time In: 3:30 pm  Time Out: 4:45 pm  Total Billable Time: 75 minutes  INSURANCE and OUTCOMES: Program Benefit Group with Lumbar Outcomes (MARGRET and AQoL)  1/3     Precautions: Standard     Pattern of pain determined: Pattern 1 PEN     Subjective     Shae reports extended time since LB discomfort including weekend activities.  Patient note she has not had to apply SIJ corrective techniques at home.  Patient report obtaining new pillows (less dense) resulting in improved sleep quality especially /c cervical position.  Patient note she uses lumbar roll in office chair and noticing improved seated tolerance.     Patient reports tolerating previous visit well /c no excess discomfort  Patient reports their pain to be 0/10 on a 0-10 scale with 0 being no pain and 10 being the worst pain imaginable.  Pain Location:     Occupation: , sedentary  Leisure: Gardening (will be stiff after that)    Pt's goals: Less back pain and be able to stand longer    Objective    Baseline Isometric Testing on Med X equipment: Testing administered by PT     Date of testin/3/2023  ROM 0-60 deg   Max Peak Torque 103    Min Peak Torque 71    Flex/Ext Ratio 1.4   % below normative data 32          Oswestry Questionnaire Review 5/3/2023   Pain Intensity 1   Personal Care  "(Washing, Dressing) 0   Lifting 1   Walking 0   Sitting 1   Standing 1   Sleeping 1   Social Life 0   Traveling 0   Employment/Homemaking 0   Score 5         Treatment     SÁNCHEZ received the treatments listed below:        SÁNCHEZ participated in neuromuscular re-education activities to improve balance, coordination, proprioception, motor control and/or posture for 10 minutes. The following activities were included:    [facility] 6/12/2023   Visit Number 7   VAS Pain Rating 0   Treadmill Time (in min.) 5   Lumbar Stretches - Slouch Overcorrection 20   Extension in Lying -   Extension in Standing -   Flexion in Lying -   Manual Therapy -   Lumbar Extension Seat Pad -   Femur Restraint -   Top Dead Center -   Counterweight -   Lumbar Flexion -   Lumbar Extension -   Lumbar Peak Torque -   Min Torque -   Test Percent Below Normative Data -   Lumbar Weight 54   Repetitions 16   Rating of Perceived Exertion 5   Ice - Z Lie (in min.) 5       SÁNCHEZ participated in therapeutic exercises to develop  for 65 minutes including:    LTR/QL stretch x10,3" (one side at a time)  Open book x10,3" B   Double knee to chest 10x2  HS stretch 3x 20     PPT x15 5" hold  Bridge x 10, x 10 /c arms crossed   90/90 heel taps 2x10      Quad   Cat/Cow x 20    LE ext x 10 B cue for dec WS   SOC x 20     NP  B figure-4 2x30"  METs for anterior rotated L innominate  B Supine hip flexor stretch  kneeling hip flexor stretch x30"  Extension in Lying 2x10       Peripheral muscle strengthening which included one set of 15-20 repetitions at a slow and controlled 10-13 second per rep pace focused on strengthening supporting musculature in order to improve body mechanics and functional mobility. Patient and therapist focused on proper form during treatment to ensure optimal strengthening of each targeted muscle group.  Machines utilized include torso rotation, leg extension, leg curl, chest press, upright row. Tricep extension, bicep curl, leg " press, and hip abduction added visit 3    SÁNCHEZ participated in dynamic functional therapeutic activities to improve functional performance and simulate household and community activities for 0 minutes. The following activities were included:      Pt given cold pack for 5 minutes to lumbar region in z-lie    Patient Education and Home Exercises     Home exercises include:  Open books,   PPU,   ab brace/PPT   hip flexor stretch    Cardio program (V5): -  Lifting education (V11): -  Posture/Lumbar roll: Obtained, uses in office chair   Fridge Magnet Discharge handout (date given): -  Equipment at home/gym membership:     Education provided:       Written Home Exercises Provided: Patient instructed to cont prior HEP.  Exercises were reviewed and SÁNCHEZ was able to demonstrate them prior to the end of the session.  SÁNCHEZ demonstrated good  understanding of the education provided.     See EMR under Patient Instructions for exercises provided prior visit.    Assessment     Patient subjective report indicate continue overall sx reduction, however patient has not inc her cardio activity to challenge her exercise tolerance.  Treatment continued with lumbopelvic control and core stability exercises including quadruped position.  Patient complete /s inc discomfort indicating appropriateness of continue performance.  SOC reintroduced for postural recognition considering patient note sedentary work conditions /c extended sitting.  Patient demonstrate good pelvic tilt control and encouraged to add SOC to everyday activity programming.  Lumbar MedX weight maintained per pt tolerance last visit.   Today pt perform 16 reps at 5 RPE indicating no decline in strength.  However, patient continue to note inc LBP upon completion.  Although patient /c min pelvic rotational evidence at beginning of tx or after MedX, patient performed METs for R ant innominate rotation /c some relief indicating likely continue regional stability deficit.   POC to continue inc B hip strengthening for pelvic stability.        Patient is making good progress towards established goals.  Pt will continue to benefit from skilled outpatient physical therapy to address the deficits stated in the impairment chart, provide pt/family education and to maximize pt's level of independence in the home and community environment.     Anticipated Barriers for therapy: None  Pt's spiritual, cultural and educational needs considered and pt agreeable to plan of care and goals as stated below:     Goals: Pt is in agreement with the following goals.     Short term goals:  6 weeks or 10 visits      - Pt will demonstrate increased MedX average isometric strength value by 15% from initial test resulting in improved ability to perform bending, lifting, and carrying activities safely, confidently. Appropriate and Ongoing  - Pt will report a reduction in worst pain score by 1-2 points for improved tolerance for lifting and carrying. Appropriate and Ongoing  - Pt able to perform HEP correctly with minimal cueing or supervision from therapist to encourage independent management of symptoms. Appropriate and Ongoing        Long term goals: 10 weeks or 20 visits   - Pt will demonstrate increased MedX average isometric strength value by 30% from initial test resulting in improved ability to perform bending, lifting, and carrying activities safely and confidently. Appropriate and Ongoing  - Pt to demonstrate ability to independently control and reduce their pain through posture positioning and mechanical movements throughout a typical day. Appropriate and Ongoing  - Pt will demonstrate reduced pain and improved functional outcomes as reported on the Oswestry Disability Index by reaching a score of 5 or less in order to demonstrate subjective improvement in pt's condition.   Appropriate and Ongoing  - Pt will demonstrate independence with the HEP at discharge. Appropriate and Ongoing  - Pt will be able  to lift and carry 15# with proper mechanics and no increase in pain (patient goal) Appropriate and Ongoing    Plan   Outpatient physical therapy 2x week for 10 weeks or 20 visits to include the following:   - Patient education  - Therapeutic exercise  - Manual therapy  - Performance testing   - Neuromuscular Re-education  - Therapeutic activity   - Modalities     Pt may be seen by PTA as part of the rehabilitation team.     Therapist: Jeffery Sanchez, PT  6/12/2023

## 2023-06-13 ENCOUNTER — LAB VISIT (OUTPATIENT)
Dept: LAB | Facility: HOSPITAL | Age: 53
End: 2023-06-13
Attending: INTERNAL MEDICINE
Payer: COMMERCIAL

## 2023-06-13 DIAGNOSIS — Z12.11 SCREEN FOR COLON CANCER: ICD-10-CM

## 2023-06-13 PROCEDURE — 82274 ASSAY TEST FOR BLOOD FECAL: CPT | Performed by: INTERNAL MEDICINE

## 2023-06-14 ENCOUNTER — CLINICAL SUPPORT (OUTPATIENT)
Dept: REHABILITATION | Facility: OTHER | Age: 53
End: 2023-06-14
Payer: COMMERCIAL

## 2023-06-14 ENCOUNTER — PATIENT MESSAGE (OUTPATIENT)
Dept: INTERNAL MEDICINE | Facility: CLINIC | Age: 53
End: 2023-06-14
Payer: COMMERCIAL

## 2023-06-14 DIAGNOSIS — R29.898 DECREASED STRENGTH OF TRUNK AND BACK: Primary | ICD-10-CM

## 2023-06-14 PROCEDURE — 97750 PHYSICAL PERFORMANCE TEST: CPT | Mod: 32

## 2023-06-14 NOTE — PROGRESS NOTES
[facility]PATIENT THERAP[facility] AND [facility] - [facility]  Physical Therapy Treatment Note     Name: Shae Trotter  Clinic Number: [id number]    Therapy Diagnosis:   Encounter Diagnosis   Name Primary?    Decreased strength of trunk and back Yes         Physician: Tea Breen, *    Visit Date: 2023  Physician Orders: PT Eval and Treat   Medical Diagnosis from Referral: M70.62 (ICD-10-CM) - Trochanteric bursitis of left hip , M54.50 (ICD-10-CM) - Recurrent low back pain     Evaluation Date: 5/3/2023  Authorization Period Expiration: 2024   Plan of Care Expiration: 8/3/2023  Reassessment Due: 2023  Visit # / Visits authorized:      Time In: 4:00 pm  Time Out: 5:00 pm  Total Billable Time: 60 minutes  INSURANCE and OUTCOMES: Program Benefit Group with Lumbar Outcomes (MARGRET and AQoL)  1/3     Precautions: Standard     Pattern of pain determined: Pattern 1 PEN  Subjective     Pt reports: she is feeling very good today. She has been doing her HEP once time a day and tries to do twice a day. She has not been able to do her cardio workouts due to being busy. Pt reports she has pain when sitting while working, which she takes 5 minutes standing breaks every hour.       Patient reports tolerating previous visit well /c no excess discomfort  Patient reports their pain to be 0/10 on a 0-10 scale with 0 being no pain and 10 being the worst pain imaginable.  Pain Location:     Occupation: , sedentary  Leisure: Gardening (will be stiff after that)    Pt's goals: Less back pain and be able to stand longer  Objective    Baseline Isometric Testing on Med X equipment: Testing administered by PT     Date of testin/3/2023  ROM 0-60 deg   Max Peak Torque 103    Min Peak Torque 71    Flex/Ext Ratio 1.4   % below normative data 32          Oswestry Questionnaire Review 5/3/2023   Pain Intensity 1   Personal Care (Washing, Dressing) 0   Lifting 1   Walking 0   Sitting 1   Standing 1   Sleeping  "1   Social Life 0   Traveling 0   Employment/Homemaking 0   Score 5         Treatment     SÁNCHEZ received the bolded treatments listed below:      SÁNCHEZ participated in neuromuscular re-education activities to improve balance, coordination, proprioception, motor control and/or posture for 10 minutes. The following activities were included:    SÁNCHEZ participated in therapeutic exercises to develop  for  minutes including:    LTR/QL stretch x10,3" (one side at a time)  Open book x10,3" B   Double knee to chest 10x2  HS stretch 3x 20     PPT x15 5" hold  Bridge x 10, x 10 /c arms crossed   90/90 heel taps 2x10   SLR + TrA contraction x10 B      Quadruped    Cat/Cow x 20    LE ext x 20 B cue for dec WS   SOC 2x10   +TrA contraction x10   +TrA + marching x10   TrA + 90/90 heel taps x10   TrA + SLR x 10 each   +Lifting training floor to table x10   + hip hinge w dowel x5, without dowel x5   + DL x10   + Golfer's lift x15 B   + Education provided on importance of SOC when working   +Palloff press -- NV       [facility] 6/14/2023   Visit Number 8   VAS Pain Rating 0   Treadmill Time (in min.) 5   Lumbar Stretches - Slouch 20   Extension in Lying -   Extension in Standing -   Flexion in Lying -   Manual Therapy -   Lumbar Extension - Seat Pad 2   Femur Restraint -   Top Dead Center -   Counterweight -   Lumbar Flexion -   Lumbar Extension -   Lumbar Peak Torque -   Lumbar Weight 54   Repetitions 16   Rating of Perceived Exertion 5          NP  B figure-4 2x30"  METs for anterior rotated L innominate  B Supine hip flexor stretch  kneeling hip flexor stretch x30"  Extension in Lying 2x10    Peripheral muscle strengthening which included one set of 15-20 repetitions at a slow and controlled 10-13 second per rep pace focused on strengthening supporting musculature in order to improve body mechanics and functional mobility. Patient and therapist focused on proper form during treatment to ensure optimal " strengthening of each targeted muscle group.  Machines utilized include torso rotation, leg extension, leg curl, chest press, upright row. Tricep extension, bicep curl, leg press, and hip abduction added visit 3    SHAE participated in dynamic functional therapeutic activities to improve functional performance and simulate household and community activities for 0 minutes. The following activities were included:      Pt given cold pack for 5 minutes to lumbar region in z-lie    Patient Education and Home Exercises     Home exercises include:  Open books,   PPU,   ab brace/PPT   hip flexor stretch    Cardio program (V5): -  Lifting education (V11): -  Posture/Lumbar roll: Obtained, uses in office chair   Fridge Magnet Discharge handout (date given): -  Equipment at home/gym membership:     Education provided:       Written Home Exercises Provided: Patient instructed to cont prior HEP.  Exercises were reviewed and SHAE was able to demonstrate them prior to the end of the session.  SHAE demonstrated good  understanding of the education provided.     See EMR under Patient Instructions for exercises provided prior visit.    Assessment   Shae tolerated core and lifting progressions well today, mild vc/tc required to correct form throughout, but able to self correct. Consider adding SOC and hip hinge exercises to HEP next visit to improve pt's coordination and endurance when performing. PTA encouraged Pt to take longer standing breaks during work hours (up to 10 minutes), in order to progress to goals of standing longer. Patient able to perform 16 reps on l/s medx machine with an RPE of 5/10. Educated patient on muscle pain vs exertion and would consider challenging patient to perform more reps NV as she has been at same weight for a few visits. Continue to progress per HB protocol.     Patient is making good progress towards established goals.  Pt will continue to benefit from skilled outpatient physical therapy to  address the deficits stated in the impairment chart, provide pt/family education and to maximize pt's level of independence in the home and community environment.     Anticipated Barriers for therapy: None  Pt's spiritual, cultural and educational needs considered and pt agreeable to plan of care and goals as stated below:     Goals: Pt is in agreement with the following goals.     Short term goals:  6 weeks or 10 visits      - Pt will demonstrate increased MedX average isometric strength value by 15% from initial test resulting in improved ability to perform bending, lifting, and carrying activities safely, confidently. Appropriate and Ongoing  - Pt will report a reduction in worst pain score by 1-2 points for improved tolerance for lifting and carrying. Appropriate and Ongoing  - Pt able to perform HEP correctly with minimal cueing or supervision from therapist to encourage independent management of symptoms. Appropriate and Ongoing        Long term goals: 10 weeks or 20 visits   - Pt will demonstrate increased MedX average isometric strength value by 30% from initial test resulting in improved ability to perform bending, lifting, and carrying activities safely and confidently. Appropriate and Ongoing  - Pt to demonstrate ability to independently control and reduce their pain through posture positioning and mechanical movements throughout a typical day. Appropriate and Ongoing  - Pt will demonstrate reduced pain and improved functional outcomes as reported on the Oswestry Disability Index by reaching a score of 5 or less in order to demonstrate subjective improvement in pt's condition.   Appropriate and Ongoing  - Pt will demonstrate independence with the HEP at discharge. Appropriate and Ongoing  - Pt will be able to lift and carry 15# with proper mechanics and no increase in pain (patient goal) Appropriate and Ongoing    Plan   Outpatient physical therapy 2x week for 10 weeks or 20 visits to include the  following:   - Patient education  - Therapeutic exercise  - Manual therapy  - Performance testing   - Neuromuscular Re-education  - Therapeutic activity   - Modalities     Pt may be seen by PTA as part of the rehabilitation team.     Therapist: Zeenat Jean-Baptiste PTA  6/14/2023

## 2023-06-16 ENCOUNTER — PATIENT MESSAGE (OUTPATIENT)
Dept: ADMINISTRATIVE | Facility: OTHER | Age: 53
End: 2023-06-16
Payer: COMMERCIAL

## 2023-06-16 ENCOUNTER — TELEPHONE (OUTPATIENT)
Dept: OBSTETRICS AND GYNECOLOGY | Facility: CLINIC | Age: 53
End: 2023-06-16
Payer: COMMERCIAL

## 2023-06-19 NOTE — PROGRESS NOTES
[facility]  Physical Therapy Treatment Note     Name: Shae Trotter  Clinic Number: [id number]    Therapy Diagnosis:   Encounter Diagnosis   Name Primary?    Decreased strength of trunk and back Yes       Physician: Tea Breen, *    Visit Date: 2023  Physician Orders: PT Eval and Treat   Medical Diagnosis from Referral: M70.62 (ICD-10-CM) - Trochanteric bursitis of left hip , M54.50 (ICD-10-CM) - Recurrent low back pain     Evaluation Date: 5/3/2023  Authorization Period Expiration: 2024   Plan of Care Expiration: 8/3/2023  Reassessment Due: 2023  Visit # / Visits authorized:      Time In: *** pm  Time Out: *** pm  Total Billable Time: ***minutes  INSURANCE and OUTCOMES: Program Benefit Group with Lumbar Outcomes (MARGRET and AQoL)  1/3     Precautions: Standard     Pattern of pain determined: Pattern 1 PEN  Subjective     Pt reports: she is feeling very good today. She has been doing her HEP once time a day and tries to do twice a day. She has not been able to do her cardio workouts due to being busy. Pt reports she has pain when sitting while working, which she takes 5 minutes standing breaks every hour.       Patient reports tolerating previous visit well /c no excess discomfort  Patient reports their pain to be 0/10 on a 0-10 scale with 0 being no pain and 10 being the worst pain imaginable.  Pain Location:     Occupation: , sedentary  Leisure: Gardening (will be stiff after that)    Pt's goals: Less back pain and be able to stand longer  Objective    Baseline Isometric Testing on Med X equipment: Testing administered by PT     Date of testin/3/2023  ROM 0-60 deg   Max Peak Torque 103    Min Peak Torque 71    Flex/Ext Ratio 1.4   % below normative data 32          Oswestry Questionnaire Review 5/3/2023   Pain Intensity 1   Personal Care (Washing, Dressing) 0   Lifting 1   Walking 0   Sitting 1   Standing 1   Sleeping 1  "  Social Life 0   Traveling 0   Employment/Homemaking 0   Score 5         Treatment     SÁNCHEZ participated in neuromuscular re-education activities to improve balance, coordination, proprioception, motor control and/or posture for - minutes. The following activities were included:    SÁNCHEZ participated in therapeutic exercises to develop  for *** minutes including:    LTR/QL stretch x10,3" (one side at a time)  Open book x10,3" B   Double knee to chest 10x2  HS stretch 3x 20     PPT x15 5" hold  Bridge x 10, x 10 /c arms crossed   90/90 heel taps 2x10   SLR + TrA contraction x10 B      Quadruped    Cat/Cow x 20    LE ext x 20 B cue for dec WS   SOC 2x10   +TrA contraction x10   +TrA + marching x10   TrA + 90/90 heel taps x10   TrA + SLR x 10 each   +Lifting training floor to table x10   + hip hinge w dowel x5, without dowel x5   + DL x10   + Golfer's lift x15 B   + Education provided on importance of SOC when working   +Palloff press -- NV       [facility] - Short 6/14/2023   Visit Number 8   VAS Pain Rating 0   Treadmill Time (in min.) 5   Lumbar Stretches - Slouch 20   Extension in Lying -   Extension in Standing -   Flexion in Lying -   Manual Therapy -   Lumbar Extension - Seat Pad 2   Femur Restraint -   Top Dead Center -   Counterweight -   Lumbar Flexion -   Lumbar Extension -   Lumbar Peak Torque -   Lumbar Weight 54   Repetitions 16   Rating of Perceived Exertion 5          NP  B figure-4 2x30"  METs for anterior rotated L innominate  B Supine hip flexor stretch  kneeling hip flexor stretch x30"  Extension in Lying 2x10    Peripheral muscle strengthening which included one set of 15-20 repetitions at a slow and controlled 10-13 second per rep pace focused on strengthening supporting musculature in order to improve body mechanics and functional mobility. Patient and therapist focused on proper form during treatment to ensure optimal strengthening of each targeted muscle group.  Machines utilized " include torso rotation, leg extension, leg curl, chest press, upright row. Tricep extension, bicep curl, leg press, and hip abduction added visit 3    SHAE participated in dynamic functional therapeutic activities to improve functional performance and simulate household and community activities for 0 minutes. The following activities were included:      Pt given cold pack for 5 minutes to lumbar region in z-lie    Patient Education and Home Exercises     Home exercises include:  Open books,   PPU,   ab brace/PPT   hip flexor stretch    Cardio program (V5): -  Lifting education (V11): -  Posture/Lumbar roll: Obtained, uses in office chair   Fridge Magnet Discharge handout (date given): -  Equipment at home/gym membership:     Education provided:       Written Home Exercises Provided: Patient instructed to cont prior HEP.  Exercises were reviewed and SHAE was able to demonstrate them prior to the end of the session.  SHAE demonstrated good  understanding of the education provided.     See EMR under Patient Instructions for exercises provided prior visit.    Assessment   Shae tolerated core and lifting progressions well today, mild vc/tc required to correct form throughout, but able to self correct. Consider adding SOC and hip hinge exercises to HEP next visit to improve pt's coordination and endurance when performing. PTA encouraged Pt to take longer standing breaks during work hours (up to 10 minutes), in order to progress to goals of standing longer. Patient able to perform 16 reps on l/s medx machine with an RPE of 5/10. Educated patient on muscle pain vs exertion and would consider challenging patient to perform more reps NV as she has been at same weight for a few visits. Continue to progress per HB protocol.     Patient is making good progress towards established goals.  Pt will continue to benefit from skilled outpatient physical therapy to address the deficits stated in the impairment chart, provide  pt/family education and to maximize pt's level of independence in the home and community environment.     Anticipated Barriers for therapy: None  Pt's spiritual, cultural and educational needs considered and pt agreeable to plan of care and goals as stated below:     Goals: Pt is in agreement with the following goals.     Short term goals:  6 weeks or 10 visits      - Pt will demonstrate increased MedX average isometric strength value by 15% from initial test resulting in improved ability to perform bending, lifting, and carrying activities safely, confidently. Appropriate and Ongoing  - Pt will report a reduction in worst pain score by 1-2 points for improved tolerance for lifting and carrying. Appropriate and Ongoing  - Pt able to perform HEP correctly with minimal cueing or supervision from therapist to encourage independent management of symptoms. Appropriate and Ongoing        Long term goals: 10 weeks or 20 visits   - Pt will demonstrate increased MedX average isometric strength value by 30% from initial test resulting in improved ability to perform bending, lifting, and carrying activities safely and confidently. Appropriate and Ongoing  - Pt to demonstrate ability to independently control and reduce their pain through posture positioning and mechanical movements throughout a typical day. Appropriate and Ongoing  - Pt will demonstrate reduced pain and improved functional outcomes as reported on the Oswestry Disability Index by reaching a score of 5 or less in order to demonstrate subjective improvement in pt's condition.   Appropriate and Ongoing  - Pt will demonstrate independence with the HEP at discharge. Appropriate and Ongoing  - Pt will be able to lift and carry 15# with proper mechanics and no increase in pain (patient goal) Appropriate and Ongoing    Plan   Outpatient physical therapy 2x week for 10 weeks or 20 visits to include the following:   - Patient education  - Therapeutic exercise  - Manual  therapy  - Performance testing   - Neuromuscular Re-education  - Therapeutic activity   - Modalities     Pt may be seen by PTA as part of the rehabilitation team.     Therapist: Carlyn Genao, PT  6/19/2023

## 2023-06-20 ENCOUNTER — CLINICAL SUPPORT (OUTPATIENT)
Dept: REHABILITATION | Facility: OTHER | Age: 53
End: 2023-06-20
Payer: COMMERCIAL

## 2023-06-20 DIAGNOSIS — R29.898 DECREASED STRENGTH OF TRUNK AND BACK: Primary | ICD-10-CM

## 2023-06-20 LAB — HEMOCCULT STL QL IA: NEGATIVE

## 2023-06-20 PROCEDURE — 97750 PHYSICAL PERFORMANCE TEST: CPT | Mod: 32

## 2023-06-20 NOTE — PROGRESS NOTES
[facility]  Physical Therapy Treatment Note     Name: Shae Trotter  Clinic Number: [id number]    Therapy Diagnosis:   Encounter Diagnosis   Name Primary?    Decreased strength of trunk and back Yes         Physician: Tea Breen, *    Visit Date: 2023  Physician Orders: PT Eval and Treat   Medical Diagnosis from Referral: M70.62 (ICD-10-CM) - Trochanteric bursitis of left hip , M54.50 (ICD-10-CM) - Recurrent low back pain     Evaluation Date: 5/3/2023  Authorization Period Expiration: 2024   Plan of Care Expiration: 8/3/2023  Reassessment Due: 2023  Visit # / Visits authorized:      Time In: 7:00  Time Out: 8:00  Total Billable Time: 60 minutes  INSURANCE and OUTCOMES: Program Benefit Group with Lumbar Outcomes (MARGRET and AQoL)  1/3     Precautions: Standard     Pattern of pain determined: Pattern 1 PEN  Subjective     Pt reports: she is feeling good today, denies pain at time but does have some discomfort at the shoulder possibly from sleeping wrong. She reports feeling well after last visit with new exercises.       Patient reports tolerating previous visit well /c no excess discomfort  Patient reports their pain to be 0/10 on a 0-10 scale with 0 being no pain and 10 being the worst pain imaginable.  Pain Location:     Occupation: , sedentary  Leisure: Gardening (will be stiff after that)    Pt's goals: Less back pain and be able to stand longer  Objective      Baseline Isometric Testing on Med X equipment: Testing administered by PT     Date of testin/3/2023  ROM 0-60 deg   Max Peak Torque 103    Min Peak Torque 71    Flex/Ext Ratio 1.4   % below normative data 32          Oswestry Questionnaire Review 5/3/2023   Pain Intensity 1   Personal Care (Washing, Dressing) 0   Lifting 1   Walking 0   Sitting 1   Standing 1   Sleeping 1   Social Life 0   Traveling 0   Employment/Homemaking 0   Score 5         Treatment     SHAE  "received the bolded treatments listed below:      SÁNCHEZ participated in neuromuscular re-education activities to improve balance, coordination, proprioception, motor control and/or posture for 10 minutes. The following activities were included:    SÁNCHEZ participated in therapeutic exercises to develop  for  minutes including:      Open book x10,3" B   Quadruped    Cat/Cow x10 3"   LE ext x 10 B cue for dec WS    Bird dog x 10  SOC 2x10   TrA contraction x10 with ball  TrA + 90/90 heel taps x10   TrA + SLR 2 x 10 each with ball  hip hinge w 5# dowel 2x10  +Palloff press --GTB x15       [facility] 6/20/2023   Visit Number 9   VAS Pain Rating 0   Treadmill Time (in min.) 5   Lumbar Stretches - Slouch Overcorrection 20   Extension in Lying -   Extension in Standing -   Flexion in Lying -   Manual Therapy -   Lumbar Extension Seat Pad -   Femur Restraint -   Top Dead Center -   Counterweight -   Lumbar Flexion -   Lumbar Extension -   Lumbar Peak Torque -   Min Torque -   Test Percent Below Normative Data -   Lumbar Weight 52   Repetitions 17   Rating of Perceived Exertion 3   Ice - Z Lie (in min.) 5        Not performed 6/20/2023 :  TrA + marching x10  Lifting training floor to table x10   DL x10   Golfer's lift x15 B   Education provided on importance of SOC when working     Peripheral muscle strengthening which included one set of 15-20 repetitions at a slow and controlled 10-13 second per rep pace focused on strengthening supporting musculature in order to improve body mechanics and functional mobility. Patient and therapist focused on proper form during treatment to ensure optimal strengthening of each targeted muscle group.  Machines utilized include torso rotation, leg extension, leg curl, chest press, upright row. Tricep extension, bicep curl, leg press, and hip abduction added visit 3    SÁNCHEZ participated in dynamic functional therapeutic activities to improve functional performance and simulate " household and community activities for 0 minutes. The following activities were included:      Pt given cold pack for 5 minutes to lumbar region in z-lie    Patient Education and Home Exercises     Home exercises include:  Open books,   PPU,   ab brace/PPT   hip flexor stretch    Cardio program (V5): -  Lifting education (V11): -  Posture/Lumbar roll: Obtained, uses in office chair   Fridge Magnet Discharge handout (date given): -  Equipment at home/gym membership:     Education provided:       Written Home Exercises Provided: Patient instructed to cont prior HEP.  Exercises were reviewed and SHAE was able to demonstrate them prior to the end of the session.  SHAE demonstrated good  understanding of the education provided.     See EMR under Patient Instructions for exercises provided prior visit.    Assessment   Shae presents to this visit without complaints of low back pain. Resumed core exercises and lifting exercises with the use of resistance, progressed quadruped exercises to bird dog with cues to brace abdominals. Decreased resistance on the lumbar medx by 5%, she was able to complete 17 repetitions, plan to progress in future visits and test next visit.     Patient is making good progress towards established goals.  Pt will continue to benefit from skilled outpatient physical therapy to address the deficits stated in the impairment chart, provide pt/family education and to maximize pt's level of independence in the home and community environment.     Anticipated Barriers for therapy: None  Pt's spiritual, cultural and educational needs considered and pt agreeable to plan of care and goals as stated below:     Goals: Pt is in agreement with the following goals.     Short term goals:  6 weeks or 10 visits      - Pt will demonstrate increased MedX average isometric strength value by 15% from initial test resulting in improved ability to perform bending, lifting, and carrying activities safely, confidently.  Appropriate and Ongoing  - Pt will report a reduction in worst pain score by 1-2 points for improved tolerance for lifting and carrying. Appropriate and Ongoing  - Pt able to perform HEP correctly with minimal cueing or supervision from therapist to encourage independent management of symptoms. Appropriate and Ongoing        Long term goals: 10 weeks or 20 visits   - Pt will demonstrate increased MedX average isometric strength value by 30% from initial test resulting in improved ability to perform bending, lifting, and carrying activities safely and confidently. Appropriate and Ongoing  - Pt to demonstrate ability to independently control and reduce their pain through posture positioning and mechanical movements throughout a typical day. Appropriate and Ongoing  - Pt will demonstrate reduced pain and improved functional outcomes as reported on the Oswestry Disability Index by reaching a score of 5 or less in order to demonstrate subjective improvement in pt's condition.   Appropriate and Ongoing  - Pt will demonstrate independence with the HEP at discharge. Appropriate and Ongoing  - Pt will be able to lift and carry 15# with proper mechanics and no increase in pain (patient goal) Appropriate and Ongoing    Plan   Outpatient physical therapy 2x week for 10 weeks or 20 visits to include the following:   - Patient education  - Therapeutic exercise  - Manual therapy  - Performance testing   - Neuromuscular Re-education  - Therapeutic activity   - Modalities     Pt may be seen by PTA as part of the rehabilitation team.     Therapist: Eliza Martinez, PT  6/20/2023

## 2023-06-26 ENCOUNTER — CLINICAL SUPPORT (OUTPATIENT)
Dept: REHABILITATION | Facility: OTHER | Age: 53
End: 2023-06-26
Payer: COMMERCIAL

## 2023-06-26 DIAGNOSIS — R29.898 DECREASED STRENGTH OF TRUNK AND BACK: Primary | ICD-10-CM

## 2023-06-26 PROCEDURE — 97750 PHYSICAL PERFORMANCE TEST: CPT | Mod: 32

## 2023-06-26 NOTE — PROGRESS NOTES
[facility]  Physical Therapy Treatment Note     Name: Shae Trotter  Clinic Number: [id number]    Therapy Diagnosis:   Encounter Diagnosis   Name Primary?    Decreased strength of trunk and back Yes         Physician: Tea Breen, *    Visit Date: 2023  Physician Orders: PT Eval and Treat   Medical Diagnosis from Referral: M70.62 (ICD-10-CM) - Trochanteric bursitis of left hip , M54.50 (ICD-10-CM) - Recurrent low back pain     Evaluation Date: 5/3/2023  Authorization Period Expiration: 2024   Plan of Care Expiration: 8/3/2023  Reassessment Due: 2023  Visit # / Visits authorized: 10/ 20     Time In: 4:00 pm  Time Out: 5:00 pm  Total Billable Time: 55 minutes  INSURANCE and OUTCOMES: Program Benefit Group with Lumbar Outcomes (MARGRET and AQoL)  1/3     Precautions: Standard     Pattern of pain determined: Pattern 1 PEN  Subjective     Pt reports: no pain currently and nothing new to report.    Patient reports tolerating previous visit well /c no excess discomfort  Patient reports their pain to be 0/10 on a 0-10 scale with 0 being no pain and 10 being the worst pain imaginable.  Pain Location:     Occupation: , sedentary  Leisure: Gardening (will be stiff after that)    Pt's goals: Less back pain and be able to stand longer  Objective      Baseline Isometric Testing on Med X equipment: Testing administered by PT     Date of testin/3/2023  ROM 0-60 deg   Max Peak Torque 103    Min Peak Torque 71    Flex/Ext Ratio 1.4   % below normative data 32          Oswestry Questionnaire Review 5/3/2023   Pain Intensity 1   Personal Care (Washing, Dressing) 0   Lifting 1   Walking 0   Sitting 1   Standing 1   Sleeping 1   Social Life 0   Traveling 0   Employment/Homemaking 0   Score 5         Treatment     SHAE received the bolded treatments listed below:      SHAE participated in neuromuscular re-education activities to improve balance,  "coordination, proprioception, motor control and/or posture for 00 minutes. The following activities were included:    [facility] 6/26/2023   Visit Number 10   VAS Pain Rating 0   Treadmill Time (in min.) 5   Lumbar Stretches - Slouch 20   Extension in Lying -   Extension in Standing -   Flexion in Lying -   Manual Therapy -   Lumbar Extension - Seat Pad -   Femur Restraint -   Top Dead Center -   Counterweight -   Lumbar Flexion -   Lumbar Extension -   Lumbar Peak Torque -   Lumbar Weight 52   Repetitions 18   Rating of Perceived Exertion 3        SÁNCHEZ participated in therapeutic exercises to develop  for  55 minutes including:    Open book x10,3" B   Quadruped    Cat/Cow x10 3"   Bird dog x 10  SOC 2x10   TrA + 90/90 heel taps x10   TrA + SLR 2 x 10 each with ball  +Bridges w/piliates ring 2x10  hip hinge w 5# dowel 2x10  Palloff press --GTB x15        Not performed 6/26/2023 :  TrA + marching x10  TrA contraction x10 with ball  Lifting training floor to table x10   DL x10   Golfer's lift x15 B   Education provided on importance of SOC when working     Peripheral muscle strengthening which included one set of 15-20 repetitions at a slow and controlled 10-13 second per rep pace focused on strengthening supporting musculature in order to improve body mechanics and functional mobility. Patient and therapist focused on proper form during treatment to ensure optimal strengthening of each targeted muscle group.  Machines utilized include torso rotation, leg extension, leg curl, chest press, upright row. Tricep extension, bicep curl, leg press, and hip abduction added visit 3    SÁNCHEZ participated in dynamic functional therapeutic activities to improve functional performance and simulate household and community activities for 0 minutes. The following activities were included:      Pt given cold pack for 5 minutes to lumbar region in z-lie    Patient Education and Home Exercises     Home exercises " include:  Open books,   PPU,   ab brace/PPT   hip flexor stretch    Cardio program (V5): -  Lifting education (V11): -  Posture/Lumbar roll: Obtained, uses in office chair   Fridge Magnet Discharge handout (date given): -  Equipment at home/gym membership:     Education provided:       Written Home Exercises Provided: Patient instructed to cont prior HEP.  Exercises were reviewed and SHAE was able to demonstrate them prior to the end of the session.  SHAE demonstrated good  understanding of the education provided.     See EMR under Patient Instructions for exercises provided prior visit.    Assessment   Shae returned without complaints of lower back pain. Core stability and functional LE/lower back strengthening ex's continued, progressing by adding bridges with hip add w/piliates ring to increase dynamic challenge of pelvic stability with bridges. Treatment was tolerated well with no increase in pain. Medx resistance remained at 52#. She completed 18 reps at a RPE of 3/10. Pt will be retested on Medx NV for midway reassessment.      Patient is making good progress towards established goals.  Pt will continue to benefit from skilled outpatient physical therapy to address the deficits stated in the impairment chart, provide pt/family education and to maximize pt's level of independence in the home and community environment.     Anticipated Barriers for therapy: None  Pt's spiritual, cultural and educational needs considered and pt agreeable to plan of care and goals as stated below:     Goals: Pt is in agreement with the following goals.     Short term goals:  6 weeks or 10 visits      - Pt will demonstrate increased MedX average isometric strength value by 15% from initial test resulting in improved ability to perform bending, lifting, and carrying activities safely, confidently. Appropriate and Ongoing  - Pt will report a reduction in worst pain score by 1-2 points for improved tolerance for lifting and  carrying. Appropriate and Ongoing  - Pt able to perform HEP correctly with minimal cueing or supervision from therapist to encourage independent management of symptoms. Appropriate and Ongoing        Long term goals: 10 weeks or 20 visits   - Pt will demonstrate increased MedX average isometric strength value by 30% from initial test resulting in improved ability to perform bending, lifting, and carrying activities safely and confidently. Appropriate and Ongoing  - Pt to demonstrate ability to independently control and reduce their pain through posture positioning and mechanical movements throughout a typical day. Appropriate and Ongoing  - Pt will demonstrate reduced pain and improved functional outcomes as reported on the Oswestry Disability Index by reaching a score of 5 or less in order to demonstrate subjective improvement in pt's condition.   Appropriate and Ongoing  - Pt will demonstrate independence with the HEP at discharge. Appropriate and Ongoing  - Pt will be able to lift and carry 15# with proper mechanics and no increase in pain (patient goal) Appropriate and Ongoing    Plan   Outpatient physical therapy 2x week for 10 weeks or 20 visits to include the following:   - Patient education  - Therapeutic exercise  - Manual therapy  - Performance testing   - Neuromuscular Re-education  - Therapeutic activity   - Modalities     Pt may be seen by PTA as part of the rehabilitation team.     Therapist: Liam Rubin PTA  6/26/2023

## 2023-06-27 ENCOUNTER — TELEPHONE (OUTPATIENT)
Dept: ALLERGY | Facility: CLINIC | Age: 53
End: 2023-06-27
Payer: COMMERCIAL

## 2023-06-27 NOTE — TELEPHONE ENCOUNTER
----- Message from Becca Acevedo sent at 6/26/2023 11:33 AM CDT -----  Regarding: Pt Advice / Injections  Contact:  [phone]  Pt called would like an apt for  allergy injections. Please Call

## 2023-06-27 NOTE — TELEPHONE ENCOUNTER
Called pt to assist with scheduling injection appt no answer lvm to give our office a call back.    Pt called would like an apt for  allergy injections.

## 2023-06-28 ENCOUNTER — TELEPHONE (OUTPATIENT)
Dept: ALLERGY | Facility: CLINIC | Age: 53
End: 2023-06-28
Payer: COMMERCIAL

## 2023-06-28 NOTE — TELEPHONE ENCOUNTER
Called and assisted the pt with rescheduling and injection appointment for 7/6/2023 @ 8:15 am       ----- Message from Jaciel Beard sent at 6/28/2023  8:35 AM CDT -----  Contact: @[phone]  Pt is calling in to schedule her injection, please call to discuss further.

## 2023-06-29 ENCOUNTER — CLINICAL SUPPORT (OUTPATIENT)
Dept: REHABILITATION | Facility: OTHER | Age: 53
End: 2023-06-29
Payer: COMMERCIAL

## 2023-06-29 DIAGNOSIS — R29.898 DECREASED STRENGTH OF TRUNK AND BACK: Primary | ICD-10-CM

## 2023-06-29 PROCEDURE — 97750 PHYSICAL PERFORMANCE TEST: CPT | Mod: 32

## 2023-06-29 NOTE — PROGRESS NOTES
[facility]  Physical Therapy Treatment Note     Name: Shae Trotter  Clinic Number: [id number]    Therapy Diagnosis:   Encounter Diagnosis   Name Primary?    Decreased strength of trunk and back Yes       Physician: Tea Breen, *    Visit Date: 2023  Physician Orders: PT Eval and Treat   Medical Diagnosis from Referral: M70.62 (ICD-10-CM) - Trochanteric bursitis of left hip , M54.50 (ICD-10-CM) - Recurrent low back pain     Evaluation Date: 5/3/2023  Authorization Period Expiration: 2024   Plan of Care Expiration: 8/3/2023  Reassessment Due: 23  Visit # / Visits authorized:      Time In: 4:00 pm  Time Out: 5:00 pm  Total Billable Time: 55 minutes  INSURANCE and OUTCOMES: Program Benefit Group with Lumbar Outcomes (MARGRET and AQoL)  1/3     Precautions: Standard     Pattern of pain determined: Pattern 1 PEN  Subjective     Pt reports: she is feeling good today and is ready to test.     Patient reports tolerating previous visit well /c no excess discomfort  Patient reports their pain to be 0/10 on a 0-10 scale with 0 being no pain and 10 being the worst pain imaginable.  Pain Location:     Occupation: , sedentary  Leisure: Gardening (will be stiff after that)    Pt's goals: Less back pain and be able to stand longer  Objective      Baseline Isometric Testing on Med X equipment: Testing administered by PT     Date of testin/3/2023  ROM 0-60 deg   Max Peak Torque 103    Min Peak Torque 71    Flex/Ext Ratio 1.4   % below normative data 32       Date of testin2023  ROM 0-60 deg   Max Peak Torque 107   Min Peak Torque 79   Flex/Ext Ratio 1.5   % below normative data 37%   5% decrease       MOVEMENT LOSS - Lumbar    Norms ROM Loss Initial 23   Flexion Fingers touch toes, sacral angle >/= 70 deg, uniform spinal curvature, posterior weight shift  within functional limits WFL   Extension ASIS surpasses toes, spine of  "scapulae surpasses heels, uniform spinal curve within functional limits WFL   Side glide Right   minimal loss P! L WFL   Side glide Left   minimal loss P! L WFL   Rotation Right PT observes contralateral shoulder within functional limits some thoracic discomfort WFL   Rotation Left PT observes contralateral shoulder within functional limits some thoracic discomfort WFL       Treatment     SÁNCHEZ received the bolded treatments listed below:      SÁNCHEZ participated in neuromuscular re-education activities to improve balance, coordination, proprioception, motor control and/or posture for 00 minutes. The following activities were included:      SÁNCHEZ participated in therapeutic exercises to develop for 55 minutes including:      Cat/Cow x10 3"  TrA + 90/90 heel taps x10   +Thoracic wall slides x12   +Bridges w/ marching 2x10  +EIS x10x    SOC 2x10   TrA + SLR 2 x 45 each with ball  hip hinge w 5# dowel 2x10  Palloff press --GTB x15   Open book x10,3" B      Not performed 6/29/2023 :  TrA + marching x10  TrA contraction x10 with ball  Lifting training floor to table x10   DL x10   Golfer's lift x15 B   Education provided on importance of SOC when working   Bird dog x 10    [facility] 6/29/2023   Visit Number 11   VAS Pain Rating 0   Treadmill Time (in min.) 5   Lumbar Stretches - Slouch -   Extension in Lying -   Extension in Standing 10   Flexion in Lying -   Manual Therapy -   Lumbar Extension - Seat Pad -   Femur Restraint -   Top Dead Center -   Counterweight -   Lumbar Flexion -   Lumbar Extension -   Lumbar Peak Torque 107   Lumbar Weight 40   Repetitions 5   Rating of Perceived Exertion -         Peripheral muscle strengthening which included one set of 15-20 repetitions at a slow and controlled 10-13 second per rep pace focused on strengthening supporting musculature in order to improve body mechanics and functional mobility. Patient and therapist focused on proper form during treatment to " ensure optimal strengthening of each targeted muscle group.  Machines utilized include torso rotation, leg extension, leg curl, chest press, upright row. Tricep extension, bicep curl, leg press, and hip abduction added visit 3    SÁNCHEZ participated in dynamic functional therapeutic activities to improve functional performance and simulate household and community activities for 0 minutes. The following activities were included:      Pt given cold pack for 5 minutes to lumbar region in z-lie    Patient Education and Home Exercises     Home exercises include:  Open books,   PPU  ab brace/PPT   hip flexor stretch    Cardio program (V5): -  Lifting education (V11): -  Posture/Lumbar roll: Obtained, uses in office chair   Fridge Magnet Discharge handout (date given): -  Equipment at home/gym membership:     Education provided:       Written Home Exercises Provided: Patient instructed to cont prior HEP.  Exercises were reviewed and SÁNCHEZ was able to demonstrate them prior to the end of the session.  SÁNCHEZ demonstrated good  understanding of the education provided.     See EMR under Patient Instructions for exercises provided prior visit.    Assessment   Patient has attended 11 visits at [facility] which included MD evaluation, PT evaluation with isometric testing, and physical therapy treatment including HEP instruction, education, aerobic activity, dynamic strengthening on MedX equipment for the spine, and whole body strengthening on MedX equipment with increasing resistance. Patient demonstrates increased ability to reduce symptoms, improve posture, improve ROM, and improve strength, as stated below:    -Improved posture, better using lumbar roll  -Improved strength at each test point on lumbar MedX isometric test with 5% average decrease noted with reduced pain noted by patient.  -Initial outcome tool score 10% and current outcome tool score 0% indicating reduced pain and improved function.    Patient is  making good progress towards established goals.  Pt will continue to benefit from skilled outpatient physical therapy to address the deficits stated in the impairment chart, provide pt/family education and to maximize pt's level of independence in the home and community environment.     Anticipated Barriers for therapy: None  Pt's spiritual, cultural and educational needs considered and pt agreeable to plan of care and goals as stated below:     Goals: Pt is in agreement with the following goals.     Short term goals:  6 weeks or 10 visits      - Pt will demonstrate increased MedX average isometric strength value by 15% from initial test resulting in improved ability to perform bending, lifting, and carrying activities safely, confidently. Appropriate and Ongoing  - Pt will report a reduction in worst pain score by 1-2 points for improved tolerance for lifting and carrying. Appropriate and Ongoing  - Pt able to perform HEP correctly with minimal cueing or supervision from therapist to encourage independent management of symptoms. Appropriate and Ongoing        Long term goals: 10 weeks or 20 visits   - Pt will demonstrate increased MedX average isometric strength value by 30% from initial test resulting in improved ability to perform bending, lifting, and carrying activities safely and confidently. Appropriate and Ongoing  - Pt to demonstrate ability to independently control and reduce their pain through posture positioning and mechanical movements throughout a typical day. Appropriate and Ongoing  - Pt will demonstrate reduced pain and improved functional outcomes as reported on the Oswestry Disability Index by reaching a score of 5 or less in order to demonstrate subjective improvement in pt's condition.   MET  - Pt will demonstrate independence with the HEP at discharge. Appropriate and Ongoing  - Pt will be able to lift and carry 15# with proper mechanics and no increase in pain (patient goal) Appropriate and  Ongoing    Plan   Outpatient physical therapy 2x week for 10 weeks or 20 visits to include the following:   - Patient education  - Therapeutic exercise  - Manual therapy  - Performance testing   - Neuromuscular Re-education  - Therapeutic activity   - Modalities     Pt may be seen by PTA as part of the rehabilitation team.     Therapist: Carlyn Genao, PT  6/29/2023

## 2023-07-03 NOTE — PROGRESS NOTES
[facility]  Physical Therapy Treatment Note     Name: Shae Trotter  Clinic Number: [id number]    Therapy Diagnosis:   Encounter Diagnosis   Name Primary?    Decreased strength of trunk and back Yes       Physician: Tea Breen, *    Visit Date: 2023  Physician Orders: PT Eval and Treat   Medical Diagnosis from Referral: M70.62 (ICD-10-CM) - Trochanteric bursitis of left hip , M54.50 (ICD-10-CM) - Recurrent low back pain     Evaluation Date: 5/3/2023  Authorization Period Expiration: 2024   Plan of Care Expiration: 8/3/2023  Reassessment Due: 23  Visit # / Visits authorized:      Time In: 7:00 am  Time Out: 7:50 am  Total Billable Time: 45 minutes  INSURANCE and OUTCOMES: Program Benefit Group with Lumbar Outcomes (MARGRET and AQoL)  1/3     Precautions: Standard     Pattern of pain determined: Pattern 1 PEN  Subjective     Pt reports: she is feeling pretty good today and had a restful vacation.     Patient reports tolerating previous visit well /c no excess discomfort  Patient reports their pain to be 0/10 on a 0-10 scale with 0 being no pain and 10 being the worst pain imaginable.  Pain Location:     Occupation: , sedentary  Leisure: Gardening (will be stiff after that)    Pt's goals: Less back pain and be able to stand longer  Objective      Baseline Isometric Testing on Med X equipment: Testing administered by PT     Date of testin/3/2023  ROM 0-60 deg   Max Peak Torque 103    Min Peak Torque 71    Flex/Ext Ratio 1.4   % below normative data 32       Date of testin2023  ROM 0-60 deg   Max Peak Torque 107   Min Peak Torque 79   Flex/Ext Ratio 1.5   % below normative data 37%   5% decrease       MOVEMENT LOSS - Lumbar    Norms ROM Loss Initial 23   Flexion Fingers touch toes, sacral angle >/= 70 deg, uniform spinal curvature, posterior weight shift  within functional limits WFL   Extension ASIS surpasses toes,  "spine of scapulae surpasses heels, uniform spinal curve within functional limits WFL   Side glide Right   minimal loss P! L WFL   Side glide Left   minimal loss P! L WFL   Rotation Right PT observes contralateral shoulder within functional limits some thoracic discomfort WFL   Rotation Left PT observes contralateral shoulder within functional limits some thoracic discomfort WFL       Treatment     SÁNCHEZ received the bolded treatments listed below:      SÁNCHEZ participated in neuromuscular re-education activities to improve balance, coordination, proprioception, motor control and/or posture for 00 minutes. The following activities were included:      SÁNCHEZ participated in therapeutic exercises to develop for 45 minutes including:    +EIL x10  +SL hip abduction 2x12  Bridge hip extension 2x12  Thoracic wall slides x12   Bridges w/ marching 2x10  +Lateral band walks GTB above knees 4xlaps of length of treatment table    Not performed 7/5/2023 :  TrA + marching x10  TrA contraction x10 with ball  Lifting training floor to table x10   DL x10   Golfer's lift x15 B   Education provided on importance of SOC when working   Bird dog x 10  SOC 2x10   hip hinge w 5# dowel 2x10  Palloff press --GTB x15   Open book x10,3" B   Cat/Cow x10 3"  TrA + 90/90 heel taps x10   EIS x10    [facility] 7/5/2023   Visit Number 12   VAS Pain Rating 0   Treadmill Time (in min.) 5   Lumbar Stretches - Slouch Overcorrection -   Extension in Lying 10   Extension in Standing -   Flexion in Lying -   Manual Therapy -   Lumbar Extension Seat Pad -   Femur Restraint -   Top Dead Center -   Counterweight -   Lumbar Flexion -   Lumbar Extension -   Lumbar Peak Torque -   Min Torque -   Test Percent Below Normative Data -   Lumbar Weight 52   Repetitions 18   Rating of Perceived Exertion 4   Ice - Z Lie (in min.) 5       Peripheral muscle strengthening which included one set of 15-20 repetitions at a slow and controlled 10-13 second per rep " pace focused on strengthening supporting musculature in order to improve body mechanics and functional mobility. Patient and therapist focused on proper form during treatment to ensure optimal strengthening of each targeted muscle group.  Machines utilized include torso rotation, leg extension, leg curl, chest press, upright row. Tricep extension, bicep curl, leg press, and hip abduction added visit 3    SHAE participated in dynamic functional therapeutic activities to improve functional performance and simulate household and community activities for 0 minutes. The following activities were included:      Pt given cold pack for 5 minutes to lumbar region in z-lie    Patient Education and Home Exercises     Home exercises include:  Open books,   PPU  ab brace/PPT   hip flexor stretch    Cardio program (V5): -  Lifting education (V11): -  Posture/Lumbar roll: Obtained, uses in office chair   Fridge Magnet Discharge handout (date given): -  Equipment at home/gym membership:     Education provided:       Written Home Exercises Provided: Patient instructed to cont prior HEP.  Exercises were reviewed and SHAE was able to demonstrate them prior to the end of the session.  SHAE demonstrated good  understanding of the education provided.     See EMR under Patient Instructions for exercises provided prior visit.    Assessment   Shae tolerated all progression well this date with emphasis on functional postural control. Added EIL this date with no reports of increased in pain or symptoms. Plan to continue to progress. Pt resume lumbar MedX resistance training this date at 52lbs, able to complete 18 reps, at 4/10 RPE.     Patient is making good progress towards established goals.  Pt will continue to benefit from skilled outpatient physical therapy to address the deficits stated in the impairment chart, provide pt/family education and to maximize pt's level of independence in the home and community environment.      Anticipated Barriers for therapy: None  Pt's spiritual, cultural and educational needs considered and pt agreeable to plan of care and goals as stated below:     Goals: Pt is in agreement with the following goals.     Short term goals:  6 weeks or 10 visits      - Pt will demonstrate increased MedX average isometric strength value by 15% from initial test resulting in improved ability to perform bending, lifting, and carrying activities safely, confidently. Appropriate and Ongoing  - Pt will report a reduction in worst pain score by 1-2 points for improved tolerance for lifting and carrying. Appropriate and Ongoing  - Pt able to perform HEP correctly with minimal cueing or supervision from therapist to encourage independent management of symptoms. Appropriate and Ongoing        Long term goals: 10 weeks or 20 visits   - Pt will demonstrate increased MedX average isometric strength value by 30% from initial test resulting in improved ability to perform bending, lifting, and carrying activities safely and confidently. Appropriate and Ongoing  - Pt to demonstrate ability to independently control and reduce their pain through posture positioning and mechanical movements throughout a typical day. Appropriate and Ongoing  - Pt will demonstrate reduced pain and improved functional outcomes as reported on the Oswestry Disability Index by reaching a score of 5 or less in order to demonstrate subjective improvement in pt's condition.   MET  - Pt will demonstrate independence with the HEP at discharge. Appropriate and Ongoing  - Pt will be able to lift and carry 15# with proper mechanics and no increase in pain (patient goal) Appropriate and Ongoing    Plan   Outpatient physical therapy 2x week for 10 weeks or 20 visits to include the following:   - Patient education  - Therapeutic exercise  - Manual therapy  - Performance testing   - Neuromuscular Re-education  - Therapeutic activity   - Modalities     Pt may be seen by  PTA as part of the rehabilitation team.     Therapist: Carlyn Genao, PT  7/3/2023

## 2023-07-05 ENCOUNTER — CLINICAL SUPPORT (OUTPATIENT)
Dept: REHABILITATION | Facility: OTHER | Age: 53
End: 2023-07-05
Payer: COMMERCIAL

## 2023-07-05 DIAGNOSIS — R29.898 DECREASED STRENGTH OF TRUNK AND BACK: Primary | ICD-10-CM

## 2023-07-05 PROCEDURE — 97750 PHYSICAL PERFORMANCE TEST: CPT | Mod: 32

## 2023-07-06 ENCOUNTER — CLINICAL SUPPORT (OUTPATIENT)
Dept: ALLERGY | Facility: CLINIC | Age: 53
End: 2023-07-06
Payer: COMMERCIAL

## 2023-07-06 DIAGNOSIS — J30.9 CHRONIC ALLERGIC RHINITIS: Primary | ICD-10-CM

## 2023-07-06 PROCEDURE — 95117 PR IMMU2THERAPY, 2+ INJECTIONS: ICD-10-PCS | Mod: S$GLB,,, | Performed by: STUDENT IN AN ORGANIZED HEALTH CARE EDUCATION/TRAINING PROGRAM

## 2023-07-06 PROCEDURE — 99999 PR PBB SHADOW E&M-EST. PATIENT-LVL I: CPT | Mod: PBBFAC,,,

## 2023-07-06 PROCEDURE — 99999 PR PBB SHADOW E&M-EST. PATIENT-LVL I: ICD-10-PCS | Mod: PBBFAC,,,

## 2023-07-06 PROCEDURE — 95117 IMMUNOTHERAPY INJECTIONS: CPT | Mod: S$GLB,,, | Performed by: STUDENT IN AN ORGANIZED HEALTH CARE EDUCATION/TRAINING PROGRAM

## 2023-07-07 ENCOUNTER — CLINICAL SUPPORT (OUTPATIENT)
Dept: REHABILITATION | Facility: OTHER | Age: 53
End: 2023-07-07
Payer: COMMERCIAL

## 2023-07-07 DIAGNOSIS — R29.898 DECREASED STRENGTH OF TRUNK AND BACK: Primary | ICD-10-CM

## 2023-07-07 PROCEDURE — 97750 PHYSICAL PERFORMANCE TEST: CPT | Mod: 32

## 2023-07-07 NOTE — PROGRESS NOTES
[facility]  Physical Therapy Treatment Note     Name: Shae Trotter  Clinic Number: [id number]    Therapy Diagnosis:   Encounter Diagnosis   Name Primary?    Decreased strength of trunk and back Yes       Physician: Tea Breen, *    Visit Date: 2023  Physician Orders: PT Eval and Treat   Medical Diagnosis from Referral: M70.62 (ICD-10-CM) - Trochanteric bursitis of left hip , M54.50 (ICD-10-CM) - Recurrent low back pain     Evaluation Date: 5/3/2023  Authorization Period Expiration: 2024   Plan of Care Expiration: 8/3/2023  Reassessment Due: 23  Visit # / Visits authorized:      Time In: 7:00 am  Time Out: 7:50 am  Total Billable Time: 50 minutes  INSURANCE and OUTCOMES: Program Benefit Group with Lumbar Outcomes (MARGRET and AQoL)  1/3     Precautions: Standard     Pattern of pain determined: Pattern 1 PEN  Subjective     Pt reports: She has not had any flairs and has been feeling pretty good    Patient reports tolerating previous visit well /c no excess discomfort  Patient reports their pain to be 0/10 on a 0-10 scale with 0 being no pain and 10 being the worst pain imaginable.  Pain Location:     Occupation: , sedentary  Leisure: Gardening (will be stiff after that)    Pt's goals: Less back pain and be able to stand longer  Objective      Baseline Isometric Testing on Med X equipment: Testing administered by PT     Date of testin/3/2023  ROM 0-60 deg   Max Peak Torque 103    Min Peak Torque 71    Flex/Ext Ratio 1.4   % below normative data 32       Date of testin2023  ROM 0-60 deg   Max Peak Torque 107   Min Peak Torque 79   Flex/Ext Ratio 1.5   % below normative data 37%   5% decrease       MOVEMENT LOSS - Lumbar    Norms ROM Loss Initial 23   Flexion Fingers touch toes, sacral angle >/= 70 deg, uniform spinal curvature, posterior weight shift  within functional limits WFL   Extension ASIS surpasses toes,  "spine of scapulae surpasses heels, uniform spinal curve within functional limits WFL   Side glide Right   minimal loss P! L WFL   Side glide Left   minimal loss P! L WFL   Rotation Right PT observes contralateral shoulder within functional limits some thoracic discomfort WFL   Rotation Left PT observes contralateral shoulder within functional limits some thoracic discomfort WFL       Treatment     SÁNCHEZ received the bolded treatments listed below:      SÁNCHEZ participated in neuromuscular re-education activities to improve balance, coordination, proprioception, motor control and/or posture for 00 minutes. The following activities were included:      SÁNCHEZ participated in therapeutic exercises to develop for 45 minutes including:    Open books stretch x10  Extension in lying 2x10  Bird dog 5"x10  +Side plank clam 2x5  Bridge + kick 2x12  Lateral band walks GTB above knees 4x  +Deadlift to over head press 2x10    [facility] 7/7/2023   Visit Number 13   VAS Pain Rating 0   Treadmill Time (in min.) 5   Lumbar Stretches - Slouch Overcorrection -   Extension in Lying 20   Extension in Standing -   Flexion in Lying -   Manual Therapy -   Lumbar Extension Seat Pad -   Femur Restraint -   Top Dead Center -   Counterweight -   Lumbar Flexion -   Lumbar Extension -   Lumbar Peak Torque -   Min Torque -   Test Percent Below Normative Data -   Lumbar Weight 52   Repetitions 20   Rating of Perceived Exertion 5   Ice - Z Lie (in min.) 5         Not performed 7/7/2023 :  Thoracic wall slides x12   Bridges w/ marching 2x10  SL hip abduction 2x12  TrA + marching x10  TrA contraction x10 with ball  Lifting training floor to table x10   DL x10   Golfer's lift x15 B   Education provided on importance of SOC when working     SOC 2x10   hip hinge w 5# dowel 2x10  Palloff press --GTB x15     Cat/Cow x10 3"  TrA + 90/90 heel taps x10   EIS x10        Pt preformed Peripheral muscle strengthening which included one set of 15-20 " repetitions at a slow and controlled 10-13 second per rep pace focused on strengthening supporting musculature in order to improve body mechanics and functional mobility. Patient and therapist focused on proper form during treatment to ensure optimal strengthening of each targeted muscle group.  Machines utilized include torso rotation, leg extension, leg curl, chest press, upright row. Tricep extension, bicep curl, leg press, and hip abduction added visit 3    SHAE participated in dynamic functional therapeutic activities to improve functional performance and simulate household and community activities for 0 minutes. The following activities were included:      Pt given cold pack for 5 minutes to lumbar region in z-lie    Patient Education and Home Exercises     Home exercises include:  Open books,   PPU  ab brace/PPT   hip flexor stretch    Cardio program (V5): -  Lifting education (V11): -  Posture/Lumbar roll: Obtained, uses in office chair   Fridge Magnet Discharge handout (date given): -  Equipment at home/gym membership:     Education provided:       Written Home Exercises Provided: Patient instructed to cont prior HEP.  Exercises were reviewed and SHAE was able to demonstrate them prior to the end of the session.  SHAE demonstrated good  understanding of the education provided.     See EMR under Patient Instructions for exercises provided prior visit.    Assessment   Shae presents to therapy without complaints of flair in the low back. Progressed strengthening exercises and functional mobility exercises as well as giving her bands and pictures for stretches and strengthening for home. Pt resume lumbar MedX resistance training this date at 52lbs, able to complete 50 reps, at 5/10 RPE with reports of muscle fatigue int he back    Patient is making good progress towards established goals.  Pt will continue to benefit from skilled outpatient physical therapy to address the deficits stated in the  impairment chart, provide pt/family education and to maximize pt's level of independence in the home and community environment.     Anticipated Barriers for therapy: None  Pt's spiritual, cultural and educational needs considered and pt agreeable to plan of care and goals as stated below:     Goals: Pt is in agreement with the following goals.     Short term goals:  6 weeks or 10 visits      - Pt will demonstrate increased MedX average isometric strength value by 15% from initial test resulting in improved ability to perform bending, lifting, and carrying activities safely, confidently. Appropriate and Ongoing  - Pt will report a reduction in worst pain score by 1-2 points for improved tolerance for lifting and carrying. Appropriate and Ongoing  - Pt able to perform HEP correctly with minimal cueing or supervision from therapist to encourage independent management of symptoms. Appropriate and Ongoing        Long term goals: 10 weeks or 20 visits   - Pt will demonstrate increased MedX average isometric strength value by 30% from initial test resulting in improved ability to perform bending, lifting, and carrying activities safely and confidently. Appropriate and Ongoing  - Pt to demonstrate ability to independently control and reduce their pain through posture positioning and mechanical movements throughout a typical day. Appropriate and Ongoing  - Pt will demonstrate reduced pain and improved functional outcomes as reported on the Oswestry Disability Index by reaching a score of 5 or less in order to demonstrate subjective improvement in pt's condition.   MET  - Pt will demonstrate independence with the HEP at discharge. Appropriate and Ongoing  - Pt will be able to lift and carry 15# with proper mechanics and no increase in pain (patient goal) Appropriate and Ongoing    Plan   Outpatient physical therapy 2x week for 10 weeks or 20 visits to include the following:   - Patient education  - Therapeutic exercise  -  Manual therapy  - Performance testing   - Neuromuscular Re-education  - Therapeutic activity   - Modalities     Pt may be seen by PTA as part of the rehabilitation team.     Therapist: Eliza Martinez, PT  7/7/2023

## 2023-07-13 ENCOUNTER — CLINICAL SUPPORT (OUTPATIENT)
Dept: REHABILITATION | Facility: OTHER | Age: 53
End: 2023-07-13
Payer: COMMERCIAL

## 2023-07-13 DIAGNOSIS — R29.898 DECREASED STRENGTH OF TRUNK AND BACK: Primary | ICD-10-CM

## 2023-07-13 PROCEDURE — 97750 PHYSICAL PERFORMANCE TEST: CPT | Mod: 32

## 2023-07-13 NOTE — PROGRESS NOTES
[facility]  Physical Therapy Treatment Note     Name: Shae Trotter  Clinic Number: [id number]    Therapy Diagnosis:   Encounter Diagnosis   Name Primary?    Decreased strength of trunk and back Yes         Physician: Tea Breen, *    Visit Date: 2023  Physician Orders: PT Eval and Treat   Medical Diagnosis from Referral: M70.62 (ICD-10-CM) - Trochanteric bursitis of left hip , M54.50 (ICD-10-CM) - Recurrent low back pain     Evaluation Date: 5/3/2023  Authorization Period Expiration: 2024   Plan of Care Expiration: 8/3/2023  Reassessment Due: 23  Visit # / Visits authorized:      Time In: 0935 am  Time Out: 1030 am  Total Billable Time: 50 minutes  INSURANCE and OUTCOMES: Program Benefit Group with Lumbar Outcomes (MARGRET and AQoL)  1/3     Precautions: Standard     Pattern of pain determined: Pattern 1 PEN  Subjective     Shae reports mod pain in L shld/UT region which radiates into the upper arm, biceps area. Pt reports LB is feeling good, no new complaints.    Patient reports tolerating previous visit well /c no excess discomfort  Patient reports their pain to be 0/10 on a 0-10 scale with 0 being no pain and 10 being the worst pain imaginable.  Pain Location:     Occupation: , sedentary  Leisure: Gardening (will be stiff after that)    Pt's goals: Less back pain and be able to stand longer  Objective      Baseline Isometric Testing on Med X equipment: Testing administered by PT     Date of testin/3/2023  ROM 0-60 deg   Max Peak Torque 103    Min Peak Torque 71    Flex/Ext Ratio 1.4   % below normative data 32       Date of testin2023  ROM 0-60 deg   Max Peak Torque 107   Min Peak Torque 79   Flex/Ext Ratio 1.5   % below normative data 37%   5% decrease       MOVEMENT LOSS - Lumbar    Norms ROM Loss Initial 23   Flexion Fingers touch toes, sacral angle >/= 70 deg, uniform spinal curvature, posterior weight  "shift  within functional limits WFL   Extension ASIS surpasses toes, spine of scapulae surpasses heels, uniform spinal curve within functional limits WFL   Side glide Right   minimal loss P! L WFL   Side glide Left   minimal loss P! L WFL   Rotation Right PT observes contralateral shoulder within functional limits some thoracic discomfort WFL   Rotation Left PT observes contralateral shoulder within functional limits some thoracic discomfort WFL       Treatment     SÁNCHEZ received the bolded treatments listed below:      SÁNCHEZ participated in neuromuscular re-education activities to improve balance, coordination, proprioception, motor control and/or posture for 00 minutes. The following activities were included:      SÁNCHEZ participated in therapeutic exercises to develop for 45 minutes including:    Open books stretch x10  Extension in lying 2x10  Bird dog 5"x10  Side plank clam 2x5 NP  +Plank 15 sec x4  +S/L clams GTB x10  Bridge + kick 2x12  Lateral band walks GTB above knees 4x  Deadlift to over head press 2x10 NP    [facility] 7/13/2023   Visit Number 14   VAS Pain Rating 0   Treadmill Time (in min.) 5   Lumbar Stretches - Slouch Overcorrection -   Extension in Lying 20   Extension in Standing -   Flexion in Lying -   Manual Therapy -   Lumbar Extension Seat Pad -   Femur Restraint -   Top Dead Center -   Counterweight -   Lumbar Flexion -   Lumbar Extension -   Lumbar Peak Torque -   Min Torque -   Test Percent Below Normative Data -   Lumbar Weight 54   Repetitions 15   Rating of Perceived Exertion 3   Ice - Z Lie (in min.) 5       Not performed 7/13/2023 :  Thoracic wall slides x12   Bridges w/ marching 2x10  SL hip abduction 2x12  TrA + marching x10  TrA contraction x10 with ball  Lifting training floor to table x10   DL x10   Golfer's lift x15 B   Education provided on importance of SOC when working   SOC 2x10   hip hinge w 5# dowel 2x10  Palloff press --GTB x15   Cat/Cow x10 3"  TrA + 90/90 heel " taps x10   EIS x10      Pt preformed Peripheral muscle strengthening which included one set of 15-20 repetitions at a slow and controlled 10-13 second per rep pace focused on strengthening supporting musculature in order to improve body mechanics and functional mobility. Patient and therapist focused on proper form during treatment to ensure optimal strengthening of each targeted muscle group.  Machines utilized include torso rotation, leg extension, leg curl, chest press, upright row. Tricep extension, bicep curl, leg press, and hip abduction added visit 3    SHAE participated in dynamic functional therapeutic activities to improve functional performance and simulate household and community activities for 0 minutes. The following activities were included:      Pt given cold pack for 5 minutes to lumbar region in z-lie    Patient Education and Home Exercises     Home exercises include:  Open books,   PPU  ab brace/PPT   hip flexor stretch    Cardio program (V5): -  Lifting education (V11): -  Posture/Lumbar roll: Obtained, uses in office chair   Fridge Magnet Discharge handout (date given): -  Equipment at home/gym membership:     Education provided:       Written Home Exercises Provided: Patient instructed to cont prior HEP.  Exercises were reviewed and SHAE was able to demonstrate them prior to the end of the session.  SHAE demonstrated good  understanding of the education provided.     See EMR under Patient Instructions for exercises provided prior visit.    Assessment   Shae returns to  with shld pain but no LBP.  Pt performed standard planks and S/L clams instead of plank clams this visit due to shld pain. Pt tolerated all mat exercises well.  Llumbar MedX resistance increased to 56lbs, able to complete 15 reps, at 3/10 RPE. Progress per HB protocol and pt's tolerance.     Patient is making good progress towards established goals.  Pt will continue to benefit from skilled outpatient physical therapy  to address the deficits stated in the impairment chart, provide pt/family education and to maximize pt's level of independence in the home and community environment.     Anticipated Barriers for therapy: None  Pt's spiritual, cultural and educational needs considered and pt agreeable to plan of care and goals as stated below:     Goals: Pt is in agreement with the following goals.     Short term goals:  6 weeks or 10 visits      - Pt will demonstrate increased MedX average isometric strength value by 15% from initial test resulting in improved ability to perform bending, lifting, and carrying activities safely, confidently. Appropriate and Ongoing  - Pt will report a reduction in worst pain score by 1-2 points for improved tolerance for lifting and carrying. Appropriate and Ongoing  - Pt able to perform HEP correctly with minimal cueing or supervision from therapist to encourage independent management of symptoms. Appropriate and Ongoing        Long term goals: 10 weeks or 20 visits   - Pt will demonstrate increased MedX average isometric strength value by 30% from initial test resulting in improved ability to perform bending, lifting, and carrying activities safely and confidently. Appropriate and Ongoing  - Pt to demonstrate ability to independently control and reduce their pain through posture positioning and mechanical movements throughout a typical day. Appropriate and Ongoing  - Pt will demonstrate reduced pain and improved functional outcomes as reported on the Oswestry Disability Index by reaching a score of 5 or less in order to demonstrate subjective improvement in pt's condition.   MET  - Pt will demonstrate independence with the HEP at discharge. Appropriate and Ongoing  - Pt will be able to lift and carry 15# with proper mechanics and no increase in pain (patient goal) Appropriate and Ongoing    Plan   Outpatient physical therapy 2x week for 10 weeks or 20 visits to include the following:   - Patient  education  - Therapeutic exercise  - Manual therapy  - Performance testing   - Neuromuscular Re-education  - Therapeutic activity   - Modalities     Pt may be seen by PTA as part of the rehabilitation team.     Therapist: Yumiko Mar, PTA  7/13/2023

## 2023-07-18 ENCOUNTER — CLINICAL SUPPORT (OUTPATIENT)
Dept: REHABILITATION | Facility: OTHER | Age: 53
End: 2023-07-18
Payer: COMMERCIAL

## 2023-07-18 DIAGNOSIS — R29.898 DECREASED STRENGTH OF TRUNK AND BACK: Primary | ICD-10-CM

## 2023-07-18 PROCEDURE — 97750 PHYSICAL PERFORMANCE TEST: CPT | Mod: 32

## 2023-07-18 NOTE — PROGRESS NOTES
[facility]  Physical Therapy Treatment Note     Name: Shae Trotter  Clinic Number: [id number]    Therapy Diagnosis:   Encounter Diagnosis   Name Primary?    Decreased strength of trunk and back Yes         Physician: Tea Breen, *    Visit Date: 2023  Physician Orders: PT Eval and Treat   Medical Diagnosis from Referral: M70.62 (ICD-10-CM) - Trochanteric bursitis of left hip , M54.50 (ICD-10-CM) - Recurrent low back pain     Evaluation Date: 5/3/2023  Authorization Period Expiration: 2024   Plan of Care Expiration: 8/3/2023  Reassessment Due: 23  Visit # / Visits authorized: 15/ 20     Time In: 07:05  Time Out: 7:50  Total Billable Time: 45 minutes  INSURANCE and OUTCOMES: Program Benefit Group with Lumbar Outcomes (MARGRET and [employer])  1/3     Precautions: Standard     Pattern of pain determined: Pattern 1 PEN  Subjective     Shae reports the back is feeling good and she is having some discomfort at the L shoulder    Patient reports tolerating previous visit well /c no excess discomfort  Patient reports their pain to be 0/10 on a 0-10 scale with 0 being no pain and 10 being the worst pain imaginable.  Pain Location:     Occupation: , sedentary  Leisure: Gardening (will be stiff after that)    Pt's goals: Less back pain and be able to stand longer  Objective      Baseline Isometric Testing on Med X equipment: Testing administered by PT     Date of testin/3/2023  ROM 0-60 deg   Max Peak Torque 103    Min Peak Torque 71    Flex/Ext Ratio 1.4   % below normative data 32       Date of testin2023  ROM 0-60 deg   Max Peak Torque 107   Min Peak Torque 79   Flex/Ext Ratio 1.5   % below normative data 37%   5% decrease       MOVEMENT LOSS - Lumbar    Norms ROM Loss Initial 23   Flexion Fingers touch toes, sacral angle >/= 70 deg, uniform spinal curvature, posterior weight shift  within functional limits WFL   Extension ASIS  "surpasses toes, spine of scapulae surpasses heels, uniform spinal curve within functional limits WFL   Side glide Right   minimal loss P! L WFL   Side glide Left   minimal loss P! L WFL   Rotation Right PT observes contralateral shoulder within functional limits some thoracic discomfort WFL   Rotation Left PT observes contralateral shoulder within functional limits some thoracic discomfort WFL       Treatment     SÁNCHEZ received the bolded treatments listed below:      SÁNCHEZ participated in neuromuscular re-education activities to improve balance, coordination, proprioception, motor control and/or posture for 00 minutes. The following activities were included:      SÁNCHEZ participated in therapeutic exercises to develop for 45 minutes including:    Treadmill 5'  Levator scapula L side 20"x2  Upper trap stretch 20"x2  Bridge + kick 2x12  S/L clams GTB x15  Open books stretch x10  Dead bug x10  Lateral band walks GTB above knees 4x    Not performed 7/18/2023 :    Extension in lying 2x10  Bird dog 5"x10  Side plank clam 2x5   Plank 15 sec x4  Dead lift to over head press 2x10 NP    [facility] 7/18/2023   Visit Number 15   VAS Pain Rating 0   Treadmill Time (in min.) 5   Lumbar Stretches - Slouch Overcorrection -   Extension in Lying -   Lumbar Weight 54   Repetitions 18   Rating of Perceived Exertion 3   Ice - Z Lie (in min.) 5         Not performed 7/18/2023 :  Thoracic wall slides x12   Bridges w/ marching 2x10  SL hip abduction 2x12  TrA + marching x10  TrA contraction x10 with ball  Lifting training floor to table x10   DL x10   Golfer's lift x15 B   Education provided on importance of SOC when working   SOC 2x10   hip hinge w 5# dowel 2x10  Palloff press --GTB x15   Cat/Cow x10 3"  TrA + 90/90 heel taps x10   EIS x10      Pt preformed Peripheral muscle strengthening which included one set of 15-20 repetitions at a slow and controlled 10-13 second per rep pace focused on strengthening supporting " musculature in order to improve body mechanics and functional mobility. Patient and therapist focused on proper form during treatment to ensure optimal strengthening of each targeted muscle group.  Machines utilized include torso rotation, leg extension, leg curl, chest press, upright row. Tricep extension, bicep curl, leg press, and hip abduction added visit 3    SHAE participated in dynamic functional therapeutic activities to improve functional performance and simulate household and community activities for 0 minutes. The following activities were included:      Pt given cold pack for 5 minutes to lumbar region in z-lie    Patient Education and Home Exercises     Home exercises include:  Open books,   PPU  ab brace/PPT   hip flexor stretch    Cardio program (V5): -  Lifting education (V11): -  Posture/Lumbar roll: Obtained, uses in office chair   Fridge Magnet Discharge handout (date given): -  Equipment at home/gym membership:     Education provided:       Written Home Exercises Provided: Patient instructed to cont prior HEP.  Exercises were reviewed and SHAE was able to demonstrate them prior to the end of the session.  SHAE demonstrated good  understanding of the education provided.     See EMR under Patient Instructions for exercises provided prior visit.    Assessment   Shae returns to  with L shoulder discomfort that is no longer radiating into the upper arm just around he upper trap area. Pt demo and given stretches for home for the levator scapula and upper trap. Resumed lumbar/core stability exercises, did not complete stretches that would put strain on the shoulder. She completed exercises without verbal complaints of pain, MedX resistance maintained at 56 ft lbs, able to complete 18 reps, at 3/10 RPE. Progress per HB protocol and pt's tolerance.       Patient is making good progress towards established goals.  Pt will continue to benefit from skilled outpatient physical therapy to address the  deficits stated in the impairment chart, provide pt/family education and to maximize pt's level of independence in the home and community environment.     Anticipated Barriers for therapy: None  Pt's spiritual, cultural and educational needs considered and pt agreeable to plan of care and goals as stated below:     Goals: Pt is in agreement with the following goals.     Short term goals:  6 weeks or 10 visits      - Pt will demonstrate increased MedX average isometric strength value by 15% from initial test resulting in improved ability to perform bending, lifting, and carrying activities safely, confidently. Appropriate and Ongoing  - Pt will report a reduction in worst pain score by 1-2 points for improved tolerance for lifting and carrying. Appropriate and Ongoing  - Pt able to perform HEP correctly with minimal cueing or supervision from therapist to encourage independent management of symptoms. Appropriate and Ongoing        Long term goals: 10 weeks or 20 visits   - Pt will demonstrate increased MedX average isometric strength value by 30% from initial test resulting in improved ability to perform bending, lifting, and carrying activities safely and confidently. Appropriate and Ongoing  - Pt to demonstrate ability to independently control and reduce their pain through posture positioning and mechanical movements throughout a typical day. Appropriate and Ongoing  - Pt will demonstrate reduced pain and improved functional outcomes as reported on the Oswestry Disability Index by reaching a score of 5 or less in order to demonstrate subjective improvement in pt's condition.   MET  - Pt will demonstrate independence with the HEP at discharge. Appropriate and Ongoing  - Pt will be able to lift and carry 15# with proper mechanics and no increase in pain (patient goal) Appropriate and Ongoing    Plan   Outpatient physical therapy 2x week for 10 weeks or 20 visits to include the following:   - Patient education  -  Therapeutic exercise  - Manual therapy  - Performance testing   - Neuromuscular Re-education  - Therapeutic activity   - Modalities     Pt may be seen by PTA as part of the rehabilitation team.     Therapist: Eliza Martinez, PT  7/18/2023

## 2023-07-21 ENCOUNTER — CLINICAL SUPPORT (OUTPATIENT)
Dept: REHABILITATION | Facility: OTHER | Age: 53
End: 2023-07-21
Payer: COMMERCIAL

## 2023-07-21 DIAGNOSIS — R29.898 DECREASED STRENGTH OF TRUNK AND BACK: Primary | ICD-10-CM

## 2023-07-21 PROCEDURE — 97750 PHYSICAL PERFORMANCE TEST: CPT | Mod: 32

## 2023-07-21 NOTE — PROGRESS NOTES
[facility]  Physical Therapy Treatment Note     Name: Shae Trotter  Clinic Number: [id number]    Therapy Diagnosis:   Encounter Diagnosis   Name Primary?    Decreased strength of trunk and back Yes         Physician: Tea Breen, *    Visit Date: 2023  Physician Orders: PT Eval and Treat   Medical Diagnosis from Referral: M70.62 (ICD-10-CM) - Trochanteric bursitis of left hip , M54.50 (ICD-10-CM) - Recurrent low back pain     Evaluation Date: 5/3/2023  Authorization Period Expiration: 2024   Plan of Care Expiration: 8/3/2023  Reassessment Due: 23  Visit # / Visits authorized: 15/ 20     Time In: 07:00  Time Out: 7:50  Total Billable Time: 47 minutes  INSURANCE and OUTCOMES: Program Benefit Group with Lumbar Outcomes (MARGRET and AQoL)  1/3     Precautions: Standard     Pattern of pain determined: Pattern 1 PEN  Subjective     Shae reports the back is feeling good and the shoulder is feeling a little better she has been doing the stretches that were given last visit.    Patient reports tolerating previous visit well /c no excess discomfort  Patient reports their pain to be 0/10 on a 0-10 scale with 0 being no pain and 10 being the worst pain imaginable.  Pain Location:     Occupation: , sedentary  Leisure: Gardening (will be stiff after that)    Pt's goals: Less back pain and be able to stand longer  Objective      Baseline Isometric Testing on Med X equipment: Testing administered by PT     Date of testin/3/2023  ROM 0-60 deg   Max Peak Torque 103    Min Peak Torque 71    Flex/Ext Ratio 1.4   % below normative data 32       Date of testin2023  ROM 0-60 deg   Max Peak Torque 107   Min Peak Torque 79   Flex/Ext Ratio 1.5   % below normative data 37%   5% decrease       MOVEMENT LOSS - Lumbar    Norms ROM Loss Initial 23   Flexion Fingers touch toes, sacral angle >/= 70 deg, uniform spinal curvature, posterior weight  "shift  within functional limits WFL   Extension ASIS surpasses toes, spine of scapulae surpasses heels, uniform spinal curve within functional limits WFL   Side glide Right   minimal loss P! L WFL   Side glide Left   minimal loss P! L WFL   Rotation Right PT observes contralateral shoulder within functional limits some thoracic discomfort WFL   Rotation Left PT observes contralateral shoulder within functional limits some thoracic discomfort WFL       Treatment     SÁNCHEZ received the bolded treatments listed below:      SÁNCHEZ participated in neuromuscular re-education activities to improve balance, coordination, proprioception, motor control and/or posture for 00 minutes. The following activities were included:      SÁNCHEZ participated in therapeutic exercises to develop for 45 minutes including:    Treadmill 5'  Cat/Cow x15  Bird dog 5"x10 + RTB  Single leg bridge 2x10  S/L clams GTB x15  Dead bug x10   Reverse chops RTB x10  Palloff press overhead lift 2x10 GTB  Lateral band walks GTB above knees 4x + piliates ring squeeze    Not performed 7/21/2023 :  Open books stretch x10  Levator scapula L side 20"x2  Upper trap stretch 20"x2  Extension in lying 2x10  Side plank clam 2x5   Plank 15 sec x4  Dead lift to over head press 2x10 NP  Lateral band walks GTB above knees 4x    [facility] 7/21/2023   Visit Number 16   VAS Pain Rating 0   Treadmill Time (in min.) 5   Lumbar Stretches - Slouch Overcorrection -   Lumbar Extension -   Lumbar Peak Torque -   Min Torque -   Test Percent Below Normative Data -   Lumbar Weight 54   Repetitions 20   Rating of Perceived Exertion 5   Ice - Z Lie (in min.) 5       Not performed 7/21/2023 :  Thoracic wall slides x12   Bridges w/ marching 2x10  SL hip abduction 2x12  TrA + marching x10  TrA contraction x10 with ball  Lifting training floor to table x10   DL x10   Golfer's lift x15 B   Education provided on importance of SOC when working   SOC 2x10   hip hinge w 5# dowel " "2x10  Palloff press --GTB x15   Cat/Cow x10 3"  TrA + 90/90 heel taps x10   EIS x10      Pt preformed Peripheral muscle strengthening which included one set of 15-20 repetitions at a slow and controlled 10-13 second per rep pace focused on strengthening supporting musculature in order to improve body mechanics and functional mobility. Patient and therapist focused on proper form during treatment to ensure optimal strengthening of each targeted muscle group.  Machines utilized include torso rotation, leg extension, leg curl, chest press, upright row. Tricep extension, bicep curl, leg press, and hip abduction added visit 3    SHAE participated in dynamic functional therapeutic activities to improve functional performance and simulate household and community activities for 0 minutes. The following activities were included:      Pt given cold pack for 5 minutes to lumbar region in z-lie    Patient Education and Home Exercises     Home exercises include:  Open books,   PPU  ab brace/PPT   hip flexor stretch    Cardio program (V5): -  Lifting education (V11): -  Posture/Lumbar roll: Obtained, uses in office chair   Fridge Magnet Discharge handout (date given): -  Equipment at home/gym membership:     Education provided:       Written Home Exercises Provided: Patient instructed to cont prior HEP.  Exercises were reviewed and SHAE was able to demonstrate them prior to the end of the session.  SHAE demonstrated good  understanding of the education provided.     See EMR under Patient Instructions for exercises provided prior visit.    Assessment   Shae returns to  with less L shoulder discomfort than previous visit, encouraged to keep up with the neck stretches. Resumed mobility exercises and increased strengthening exercises for lumbo pelvic stability. Attempted to add ball for dead Bugs but she had difficulty and discomfort in the back. Added standing exercises for trunk stability and mobility she was able to " complete       Patient is making good progress towards established goals.  Pt will continue to benefit from skilled outpatient physical therapy to address the deficits stated in the impairment chart, provide pt/family education and to maximize pt's level of independence in the home and community environment.     Anticipated Barriers for therapy: None  Pt's spiritual, cultural and educational needs considered and pt agreeable to plan of care and goals as stated below:     Goals: Pt is in agreement with the following goals.     Short term goals:  6 weeks or 10 visits      - Pt will demonstrate increased MedX average isometric strength value by 15% from initial test resulting in improved ability to perform bending, lifting, and carrying activities safely, confidently. Appropriate and Ongoing  - Pt will report a reduction in worst pain score by 1-2 points for improved tolerance for lifting and carrying. Appropriate and Ongoing  - Pt able to perform HEP correctly with minimal cueing or supervision from therapist to encourage independent management of symptoms. Appropriate and Ongoing        Long term goals: 10 weeks or 20 visits   - Pt will demonstrate increased MedX average isometric strength value by 30% from initial test resulting in improved ability to perform bending, lifting, and carrying activities safely and confidently. Appropriate and Ongoing  - Pt to demonstrate ability to independently control and reduce their pain through posture positioning and mechanical movements throughout a typical day. Appropriate and Ongoing  - Pt will demonstrate reduced pain and improved functional outcomes as reported on the Oswestry Disability Index by reaching a score of 5 or less in order to demonstrate subjective improvement in pt's condition.   MET  - Pt will demonstrate independence with the HEP at discharge. Appropriate and Ongoing  - Pt will be able to lift and carry 15# with proper mechanics and no increase in pain  (patient goal) Appropriate and Ongoing    Plan   Outpatient physical therapy 2x week for 10 weeks or 20 visits to include the following:   - Patient education  - Therapeutic exercise  - Manual therapy  - Performance testing   - Neuromuscular Re-education  - Therapeutic activity   - Modalities     Pt may be seen by PTA as part of the rehabilitation team.     Therapist: Eliza Martinez, PT  7/21/2023

## 2023-07-25 ENCOUNTER — CLINICAL SUPPORT (OUTPATIENT)
Dept: REHABILITATION | Facility: OTHER | Age: 53
End: 2023-07-25
Payer: COMMERCIAL

## 2023-07-25 DIAGNOSIS — R29.898 DECREASED STRENGTH OF TRUNK AND BACK: Primary | ICD-10-CM

## 2023-07-25 PROCEDURE — 97750 PHYSICAL PERFORMANCE TEST: CPT | Mod: 32

## 2023-07-25 NOTE — PROGRESS NOTES
[facility] OUTPATIENT THERAPY AND WELLNESS - HEALTHY BACK  Physical Therapy Treatment Note     Name: Shae Trotter  Clinic Number: [id number]    Therapy Diagnosis:   Encounter Diagnosis   Name Primary?    Decreased strength of trunk and back Yes         Physician: Tea Breen, *    Visit Date: 2023  Physician Orders: PT Eval and Treat   Medical Diagnosis from Referral: M70.62 (ICD-10-CM) - Trochanteric bursitis of left hip , M54.50 (ICD-10-CM) - Recurrent low back pain     Evaluation Date: 5/3/2023  Authorization Period Expiration: 2024   Plan of Care Expiration: 8/3/2023  Reassessment Due: 23  Visit # / Visits authorized:      Time In: 07:00  Time Out: 7:55  Total Billable Time: 55 minutes  INSURANCE and OUTCOMES: Program Benefit Group with Lumbar Outcomes (MARGRET and AQoL)  1/3     Precautions: Standard     Pattern of pain determined: Pattern 1 PEN  Subjective     Shae reports the back is feeling good and she is still feeling a little discomfort in the L side of the neck and shoulder    Patient reports tolerating previous visit well /c no excess discomfort  Patient reports their pain to be 0/10 on a 0-10 scale with 0 being no pain and 10 being the worst pain imaginable.  Pain Location:     Occupation: , sedentary  Leisure: Gardening (will be stiff after that)    Pt's goals: Less back pain and be able to stand longer  Objective      Baseline Isometric Testing on Med X equipment: Testing administered by PT     Date of testin/3/2023  ROM 0-60 deg   Max Peak Torque 103    Min Peak Torque 71    Flex/Ext Ratio 1.4   % below normative data 32       Date of testin2023  ROM 0-60 deg   Max Peak Torque 107   Min Peak Torque 79   Flex/Ext Ratio 1.5   % below normative data 37%   5% decrease       MOVEMENT LOSS - Lumbar    Norms ROM Loss Initial 23   Flexion Fingers touch toes, sacral angle >/= 70 deg, uniform spinal curvature, posterior weight shift  within  "functional limits WFL   Extension ASIS surpasses toes, spine of scapulae surpasses heels, uniform spinal curve within functional limits WFL   Side glide Right   minimal loss P! L WFL   Side glide Left   minimal loss P! L WFL   Rotation Right PT observes contralateral shoulder within functional limits some thoracic discomfort WFL   Rotation Left PT observes contralateral shoulder within functional limits some thoracic discomfort WFL       Treatment     SÁNCHEZ received the bolded treatments listed below:      SÁNCHEZ participated in neuromuscular re-education activities to improve balance, coordination, proprioception, motor control and/or posture for 00 minutes. The following activities were included:      SÁNCHEZ participated in therapeutic exercises to develop for 45 minutes including:    Treadmill 5'  Cat/Cow x10  +Robert pose to upward dog x10  +Quadruped thread the needle x10  Bird dog 5"x10 + RTB  Single leg bridge 2x10  S/L clams GTB x15  Dead bug x10   Reverse chops RTB x15  Palloff press overhead lift 2x10 GTB  Lateral band walks GTB above knees 4x + piliates ring squeeze    Not performed 7/25/2023 :  Open books stretch x10  Levator scapula L side 20"x2  Upper trap stretch 20"x2  Extension in lying 2x10  Side plank clam 2x5   Plank 15 sec x4  Dead lift to over head press 2x10 NP  Lateral band walks GTB above knees 4x    [facility] 7/21/2023   Visit Number 16   VAS Pain Rating 0   Treadmill Time (in min.) 5   Lumbar Stretches - Slouch Overcorrection -   Lumbar Extension -   Lumbar Peak Torque -   Min Torque -   Test Percent Below Normative Data -   Lumbar Weight 54   Repetitions 20   Rating of Perceived Exertion 5   Ice - Z Lie (in min.) 5       Not performed 7/25/2023 :  Thoracic wall slides x12   Bridges w/ marching 2x10  SL hip abduction 2x12  TrA + marching x10  TrA contraction x10 with ball  Lifting training floor to table x10   DL x10   Golfer's lift x15 B   Education provided on importance of " "SOC when working   SOC 2x10   hip hinge w 5# dowel 2x10  Palloff press --GTB x15   Cat/Cow x10 3"  TrA + 90/90 heel taps x10   EIS x10      Pt preformed Peripheral muscle strengthening which included one set of 15-20 repetitions at a slow and controlled 10-13 second per rep pace focused on strengthening supporting musculature in order to improve body mechanics and functional mobility. Patient and therapist focused on proper form during treatment to ensure optimal strengthening of each targeted muscle group.  Machines utilized include torso rotation, leg extension, leg curl, chest press, upright row. Tricep extension, bicep curl, leg press, and hip abduction added visit 3    SHAE participated in dynamic functional therapeutic activities to improve functional performance and simulate household and community activities for 0 minutes. The following activities were included:      Pt given cold pack for 5 minutes to lumbar region in z-lie    Patient Education and Home Exercises     Home exercises include:  Open books,   PPU  ab brace/PPT   hip flexor stretch    Cardio program (V5): -  Lifting education (V11): -  Posture/Lumbar roll: Obtained, uses in office chair   Fridge Magnet Discharge handout (date given): -  Equipment at home/gym membership:     Education provided:       Written Home Exercises Provided: Patient instructed to cont prior HEP.  Exercises were reviewed and SHAE was able to demonstrate them prior to the end of the session.  SHAE demonstrated good  understanding of the education provided.     See EMR under Patient Instructions for exercises provided prior visit.    Assessment   Shae returns to  with no complaints of back pain. Continued with stretches and added active stretching/mobility exercises for lumbar flexion, extension, and rotation. Resumed strengthening exercises in quadruped, supine, and standing. Pt verbalizes distaste with standing banded exercises, educated pt on benefits of " strengting the back for rotation and stability. She report not feeling the benefit of the standing exercises or the muscle working. Increased resistance on the lumbar medx and     less L shoulder discomfort than previous visit, encouraged to keep up with the neck stretches. Resumed mobility exercises and increased strengthening exercises for lumbo pelvic stability. Attempted to add ball for dead Bugs but she had difficulty and discomfort in the back. Added standing exercises for trunk stability and mobility she was able to complete       Patient is making good progress towards established goals.  Pt will continue to benefit from skilled outpatient physical therapy to address the deficits stated in the impairment chart, provide pt/family education and to maximize pt's level of independence in the home and community environment.     Anticipated Barriers for therapy: None  Pt's spiritual, cultural and educational needs considered and pt agreeable to plan of care and goals as stated below:     Goals: Pt is in agreement with the following goals.     Short term goals:  6 weeks or 10 visits      - Pt will demonstrate increased MedX average isometric strength value by 15% from initial test resulting in improved ability to perform bending, lifting, and carrying activities safely, confidently. Appropriate and Ongoing  - Pt will report a reduction in worst pain score by 1-2 points for improved tolerance for lifting and carrying. Appropriate and Ongoing  - Pt able to perform HEP correctly with minimal cueing or supervision from therapist to encourage independent management of symptoms. Appropriate and Ongoing        Long term goals: 10 weeks or 20 visits   - Pt will demonstrate increased MedX average isometric strength value by 30% from initial test resulting in improved ability to perform bending, lifting, and carrying activities safely and confidently. Appropriate and Ongoing  - Pt to demonstrate ability to independently  control and reduce their pain through posture positioning and mechanical movements throughout a typical day. Appropriate and Ongoing  - Pt will demonstrate reduced pain and improved functional outcomes as reported on the Oswestry Disability Index by reaching a score of 5 or less in order to demonstrate subjective improvement in pt's condition.   MET  - Pt will demonstrate independence with the HEP at discharge. Appropriate and Ongoing  - Pt will be able to lift and carry 15# with proper mechanics and no increase in pain (patient goal) Appropriate and Ongoing    Plan   Outpatient physical therapy 2x week for 10 weeks or 20 visits to include the following:   - Patient education  - Therapeutic exercise  - Manual therapy  - Performance testing   - Neuromuscular Re-education  - Therapeutic activity   - Modalities     Pt may be seen by PTA as part of the rehabilitation team.     Therapist: Eliza Martinez, PT  7/25/2023

## 2023-07-31 ENCOUNTER — PATIENT MESSAGE (OUTPATIENT)
Dept: INTERNAL MEDICINE | Facility: CLINIC | Age: 53
End: 2023-07-31
Payer: COMMERCIAL

## 2023-08-01 ENCOUNTER — CLINICAL SUPPORT (OUTPATIENT)
Dept: ALLERGY | Facility: CLINIC | Age: 53
End: 2023-08-01
Payer: COMMERCIAL

## 2023-08-01 DIAGNOSIS — J30.9 CHRONIC ALLERGIC RHINITIS: Primary | ICD-10-CM

## 2023-08-01 PROCEDURE — 95117 PR IMMU2THERAPY, 2+ INJECTIONS: ICD-10-PCS | Mod: S$GLB,,, | Performed by: STUDENT IN AN ORGANIZED HEALTH CARE EDUCATION/TRAINING PROGRAM

## 2023-08-01 PROCEDURE — 95117 IMMUNOTHERAPY INJECTIONS: CPT | Mod: S$GLB,,, | Performed by: STUDENT IN AN ORGANIZED HEALTH CARE EDUCATION/TRAINING PROGRAM

## 2023-08-01 PROCEDURE — 99999 PR PBB SHADOW E&M-EST. PATIENT-LVL I: ICD-10-PCS | Mod: PBBFAC,,,

## 2023-08-01 PROCEDURE — 99999 PR PBB SHADOW E&M-EST. PATIENT-LVL I: CPT | Mod: PBBFAC,,,

## 2023-08-02 ENCOUNTER — LAB VISIT (OUTPATIENT)
Dept: LAB | Facility: OTHER | Age: 53
End: 2023-08-02
Attending: INTERNAL MEDICINE
Payer: COMMERCIAL

## 2023-08-02 DIAGNOSIS — E78.2 MIXED HYPERLIPIDEMIA: ICD-10-CM

## 2023-08-02 LAB
CHOLEST SERPL-MCNC: 239 MG/DL (ref 120–199)
CHOLEST/HDLC SERPL: 3.8 {RATIO} (ref 2–5)
HDLC SERPL-MCNC: 63 MG/DL (ref 40–75)
HDLC SERPL: 26.4 % (ref 20–50)
LDLC SERPL CALC-MCNC: 162.2 MG/DL (ref 63–159)
NONHDLC SERPL-MCNC: 176 MG/DL
TRIGL SERPL-MCNC: 69 MG/DL (ref 30–150)

## 2023-08-02 PROCEDURE — 80061 LIPID PANEL: CPT | Performed by: INTERNAL MEDICINE

## 2023-08-02 PROCEDURE — 36415 COLL VENOUS BLD VENIPUNCTURE: CPT | Performed by: INTERNAL MEDICINE

## 2023-08-08 ENCOUNTER — OFFICE VISIT (OUTPATIENT)
Dept: DERMATOLOGY | Facility: CLINIC | Age: 53
End: 2023-08-08
Payer: COMMERCIAL

## 2023-08-08 DIAGNOSIS — L72.0 MILIA: Primary | ICD-10-CM

## 2023-08-08 DIAGNOSIS — L81.1 MELASMA: ICD-10-CM

## 2023-08-08 DIAGNOSIS — L30.8 OTHER ECZEMA: ICD-10-CM

## 2023-08-08 PROCEDURE — 1160F RVW MEDS BY RX/DR IN RCRD: CPT | Mod: CPTII,S$GLB,, | Performed by: DERMATOLOGY

## 2023-08-08 PROCEDURE — 3044F HG A1C LEVEL LT 7.0%: CPT | Mod: CPTII,S$GLB,, | Performed by: DERMATOLOGY

## 2023-08-08 PROCEDURE — 99214 OFFICE O/P EST MOD 30 MIN: CPT | Mod: S$GLB,,, | Performed by: DERMATOLOGY

## 2023-08-08 PROCEDURE — 99214 PR OFFICE/OUTPT VISIT, EST, LEVL IV, 30-39 MIN: ICD-10-PCS | Mod: S$GLB,,, | Performed by: DERMATOLOGY

## 2023-08-08 PROCEDURE — 1160F PR REVIEW ALL MEDS BY PRESCRIBER/CLIN PHARMACIST DOCUMENTED: ICD-10-PCS | Mod: CPTII,S$GLB,, | Performed by: DERMATOLOGY

## 2023-08-08 PROCEDURE — 1159F PR MEDICATION LIST DOCUMENTED IN MEDICAL RECORD: ICD-10-PCS | Mod: CPTII,S$GLB,, | Performed by: DERMATOLOGY

## 2023-08-08 PROCEDURE — 3044F PR MOST RECENT HEMOGLOBIN A1C LEVEL <7.0%: ICD-10-PCS | Mod: CPTII,S$GLB,, | Performed by: DERMATOLOGY

## 2023-08-08 PROCEDURE — 1159F MED LIST DOCD IN RCRD: CPT | Mod: CPTII,S$GLB,, | Performed by: DERMATOLOGY

## 2023-08-08 RX ORDER — FLUOCINONIDE CREAM (EMULSIFIED BASE) 0.5 MG/G
CREAM TOPICAL 2 TIMES DAILY
Qty: 60 G | Refills: 1 | Status: SHIPPED | OUTPATIENT
Start: 2023-08-08

## 2023-08-08 RX ORDER — TRETINOIN 0.25 MG/G
CREAM TOPICAL
Qty: 30 G | Refills: 5 | Status: SHIPPED | OUTPATIENT
Start: 2023-08-08

## 2023-08-08 NOTE — PROGRESS NOTES
Patient Information  Name: Shae Trotter  : 1970  MRN: [id number]     Referring Physician:  No ref. provider found   Primary Care Physician:  Dalila Phoenix MD   Date of Visit: 2023      Subjective:     History of Present lllness:    Shae Trotter is a 52 y.o. female who presents with a chief complaint of white bumps under skin on face.  Location: L eyelid  Duration: 1 month  Signs/Symptoms: white bump under skin  Relieving factors/Prior treatments: none    She would like to know what is best to treat her facial skin.  Still gets little rashes that come and go on feet. Not present today. Responds well to topical steroid, needs refill.    Patient was last seen: 10/15/2021.  Prior notes by myself reviewed.   Clinical documentation obtained by nursing staff reviewed.    Review of Systems   Skin:  Positive for activity-related sunscreen use.       Objective:   Physical Exam   Constitutional: She appears well-developed and well-nourished. No distress.   Neurological: She is alert and oriented to person, place, and time. She is not disoriented.   Psychiatric: She has a normal mood and affect.   Skin:   Areas Examined (abnormalities noted in diagram):   Head / Face Inspection Performed            Diagram Legend     Erythematous scaling macule/papule c/w actinic keratosis       Vascular papule c/w angioma      Pigmented verrucoid papule/plaque c/w seborrheic keratosis      Yellow umbilicated papule c/w sebaceous hyperplasia      Irregularly shaped tan macule c/w lentigo     1-2 mm smooth white papules consistent with Milia      Movable subcutaneous cyst with punctum c/w epidermal inclusion cyst      Subcutaneous movable cyst c/w pilar cyst      Firm pink to brown papule c/w dermatofibroma      Pedunculated fleshy papule(s) c/w skin tag(s)      Evenly pigmented macule c/w junctional nevus     Mildly variegated pigmented, slightly irregular-bordered macule c/w mildly atypical nevus      Flesh colored  to evenly pigmented papule c/w intradermal nevus       Pink pearly papule/plaque c/w basal cell carcinoma      Erythematous hyperkeratotic cursted plaque c/w SCC      Surgical scar with no sign of skin cancer recurrence      Open and closed comedones      Inflammatory papules and pustules      Verrucoid papule consistent consistent with wart     Erythematous eczematous patches and plaques     Dystrophic onycholytic nail with subungual debris c/w onychomycosis     Umbilicated papule    Erythematous-base heme-crusted tan verrucoid plaque consistent with inflamed seborrheic keratosis     Erythematous Silvery Scaling Plaque c/w Psoriasis     See annotation    No images are attached to the encounter or orders placed in the encounter.      [] Data reviewed  [] Prior external notes reviewed  [] Independent review of test  [] Management discussed with another provider  [] Independent historian    Assessment / Plan:        Maritza  This is a small, benign cyst. Reassurance provided. No treatment is necessary unless it is symptomatic. These may resolve on their own.    Melasma  - chronic problem, not at treatment goal  Discussed importance of diligent daily sun protection with a tinted, broad-spectrum sunscreen with SPF 50+.  -     tretinoin (RETIN-A) 0.025 % cream; Compound tretinoin 0.025% / azelaic acid 8% / niacinamide 2% cream. Apply a pea-sized amount to entire face qhs.  Dispense: 30 g; Refill: 5  Can alternate with  OTC retinol  until skin adjusts.  Counseled pt that the retinoid may make the skin dry, red, and irritated. May moisturize daily with a non-comedogenic moisturizer. If still dry, would recommend using the retinoid every other night or less frequently as tolerated. Avoid using around corners of eyes, nose, and mouth.  Patient instructed in importance of daily broad-spectrum sunscreen use with SPF of at least 30. Sun avoidance and topical protection/protective clothing discussed.  Written instructions were  provided.    Other eczema   - stable and chronic  -     fluocinonide-emollient (FLUOCINONIDE-E) 0.05 % Crea; Apply topically 2 (two) times daily. AAA of body daily-BID prn eczema.  Dispense: 60 g; Refill: 1  Side effects from the overuse of topical steroids include thinning of skin, easy tearing/bruising of skin, stretch marks, spider veins, etc. Use the topical steroid no more than 2 days per week if used long-term and/or take breaks from the topical steroid, especially if any of the above side effects are noticed.      Follow up in about 3 months (around 11/8/2023).      Margarita Barone MD, FAAD  [facility]

## 2023-08-08 NOTE — PATIENT INSTRUCTIONS
Your prescription has been sent to the compounding pharmacy [facility].  They are located in [place] at [address] just before the [address].  If you have any questions, please call the pharmacy at [phone].  Website: [email or web address]       RETINOIDS   Your Doctor has prescribed a topical retinoid for your skin.  A retinoid is a vitamin A-derived product used to treat a variety of skin conditions including acne, actinic keratoses (pre-skin cancers), uneven pigmentation from sun damage, fine lines and wrinkles, and enlarged pores.      How do they work?   Retinoids increase skin cell turn over from the normal 30 days to five or six days, minimizing clogged pores--the major factor in acne.  Retinoids can also repair the DNA in cells damaged by the sun, helping to even out skin pigmentation and clear pre-skin cancers.  They stimulate collagen remodeling and repair, reversing signs of maturing skin.   They can shrink oil glands and minimize the appearance of large pores. These effects can not be appreciated unless the medication is used on a consistent basis!    How do I use a retinoid?   After washing with a mild cleanser (CeraVe, Cetaphil, Neutrogena, Purpose), the skin should be moisturized with a non-retinol containing moisturizer such as Cerave cream or PM lotion. Then, a thin layer of medication is applied to the forehead, nose, cheeks, and chin (and around eyes if treating fine wrinkles) at night.  The amount of medication needed to cover the entire face should be no more than the size of a green pea. Irritation around the eyes can be treated with Vaseline at night.     What if my skin appears dry, red, and is peeling?   Retinoids do not cause dry skin but rather they cause the top layer of the skin to shed, giving an appearance of dry skin.  In fact, new healthy skin cells are replacing the older, damaged cells on the surface. This usually occurs the first 2-4 weeks as the skin is  adjusting to the medication.  It is reasonable to use the medication every other night or even every three nights until your skin adjusts.  You can use a MILD exfoliant to remove the peeling skin (Aveeno daily clarifying pads, Aqua glycolic face cream) and can apply a moisturizer throughout the day as needed. Retinoids come in a variety of strengths and vehicles, and your doctor can find one best for you.  If you cannot tolerate prescription strength retinoids, over the counter products with retinol may be beneficial (Olay ProX wrinkle cream, NEY deep wrinkle cream, Green Cream at [employer])    Will my skin be more sensitive in the sun?   You will need to use a sunscreen with SPF 30 daily.  Retinoids will cause the outermost layer of the skin to be thinner and thus more sensitive to ultraviolet rays.  However, remember that over time, retinoids actually make the skin thicker by enhancing collagen deposition which protects the skin from sun damage.     When will I see results?   If you are using a retinoid for acne, you should see some improvement in 6-8 weeks.  Do not be alarmed if you find that your acne gets WORSE before it gets better- KEEP USING THE MEDICATION- this is a normal response and your acne will improve if you can stick with it.     If you are using the medication for anti-aging and skin dyspigmentation, you may see results in 3 months, but most effects are not visible until 6 months.  Retinoids are clinically proven to reverse signs of aging, but only if used on a CONSISTENT BASIS!   *Remember that retinoids should not be used if you are pregnant.  *Discontinue use 1 week prior to waxing, as skin is more likely to tear.

## 2023-08-17 ENCOUNTER — PATIENT MESSAGE (OUTPATIENT)
Dept: ADMINISTRATIVE | Facility: OTHER | Age: 53
End: 2023-08-17
Payer: COMMERCIAL

## 2023-08-17 ENCOUNTER — PATIENT MESSAGE (OUTPATIENT)
Dept: OPTOMETRY | Facility: CLINIC | Age: 53
End: 2023-08-17
Payer: COMMERCIAL

## 2023-09-12 ENCOUNTER — TELEPHONE (OUTPATIENT)
Dept: ALLERGY | Facility: CLINIC | Age: 53
End: 2023-09-12
Payer: COMMERCIAL

## 2023-09-12 NOTE — TELEPHONE ENCOUNTER
----- Message from Thomas Ibrahim sent at 9/12/2023 10:07 AM CDT -----  Regarding: appt access  Contact: pt [phone]  PATIENT CALL    Pt is calling to speak with someone in provider's office regarding scheduling her allergy injections and would like a call back. Soonest appt in Epic is 2 weeks away, and she needs to be seen this week if possible. Please call pt at [phone] to schedule.

## 2023-09-14 ENCOUNTER — OFFICE VISIT (OUTPATIENT)
Dept: OBSTETRICS AND GYNECOLOGY | Facility: CLINIC | Age: 53
End: 2023-09-14
Payer: COMMERCIAL

## 2023-09-14 VITALS
BODY MASS INDEX: 18.78 KG/M2 | HEIGHT: 62 IN | DIASTOLIC BLOOD PRESSURE: 66 MMHG | WEIGHT: 102.06 LBS | SYSTOLIC BLOOD PRESSURE: 100 MMHG

## 2023-09-14 DIAGNOSIS — Z01.419 ENCOUNTER FOR GYNECOLOGICAL EXAMINATION: Primary | ICD-10-CM

## 2023-09-14 DIAGNOSIS — Z85.3 PERSONAL HISTORY OF BREAST CANCER: ICD-10-CM

## 2023-09-14 DIAGNOSIS — Z15.09 BRCA2 GENE MUTATION POSITIVE: ICD-10-CM

## 2023-09-14 DIAGNOSIS — Z15.01 BRCA2 GENE MUTATION POSITIVE: ICD-10-CM

## 2023-09-14 PROCEDURE — 99999 PR PBB SHADOW E&M-EST. PATIENT-LVL III: CPT | Mod: PBBFAC,,, | Performed by: OBSTETRICS & GYNECOLOGY

## 2023-09-14 PROCEDURE — 3008F PR BODY MASS INDEX (BMI) DOCUMENTED: ICD-10-PCS | Mod: CPTII,S$GLB,, | Performed by: OBSTETRICS & GYNECOLOGY

## 2023-09-14 PROCEDURE — 3044F HG A1C LEVEL LT 7.0%: CPT | Mod: CPTII,S$GLB,, | Performed by: OBSTETRICS & GYNECOLOGY

## 2023-09-14 PROCEDURE — 3078F PR MOST RECENT DIASTOLIC BLOOD PRESSURE < 80 MM HG: ICD-10-PCS | Mod: CPTII,S$GLB,, | Performed by: OBSTETRICS & GYNECOLOGY

## 2023-09-14 PROCEDURE — 3078F DIAST BP <80 MM HG: CPT | Mod: CPTII,S$GLB,, | Performed by: OBSTETRICS & GYNECOLOGY

## 2023-09-14 PROCEDURE — 3008F BODY MASS INDEX DOCD: CPT | Mod: CPTII,S$GLB,, | Performed by: OBSTETRICS & GYNECOLOGY

## 2023-09-14 PROCEDURE — 1159F PR MEDICATION LIST DOCUMENTED IN MEDICAL RECORD: ICD-10-PCS | Mod: CPTII,S$GLB,, | Performed by: OBSTETRICS & GYNECOLOGY

## 2023-09-14 PROCEDURE — 99386 PREV VISIT NEW AGE 40-64: CPT | Mod: S$GLB,,, | Performed by: OBSTETRICS & GYNECOLOGY

## 2023-09-14 PROCEDURE — 3044F PR MOST RECENT HEMOGLOBIN A1C LEVEL <7.0%: ICD-10-PCS | Mod: CPTII,S$GLB,, | Performed by: OBSTETRICS & GYNECOLOGY

## 2023-09-14 PROCEDURE — 99386 PR PREVENTIVE VISIT,NEW,40-64: ICD-10-PCS | Mod: S$GLB,,, | Performed by: OBSTETRICS & GYNECOLOGY

## 2023-09-14 PROCEDURE — 99999 PR PBB SHADOW E&M-EST. PATIENT-LVL III: ICD-10-PCS | Mod: PBBFAC,,, | Performed by: OBSTETRICS & GYNECOLOGY

## 2023-09-14 PROCEDURE — 3074F PR MOST RECENT SYSTOLIC BLOOD PRESSURE < 130 MM HG: ICD-10-PCS | Mod: CPTII,S$GLB,, | Performed by: OBSTETRICS & GYNECOLOGY

## 2023-09-14 PROCEDURE — 3074F SYST BP LT 130 MM HG: CPT | Mod: CPTII,S$GLB,, | Performed by: OBSTETRICS & GYNECOLOGY

## 2023-09-14 PROCEDURE — 1159F MED LIST DOCD IN RCRD: CPT | Mod: CPTII,S$GLB,, | Performed by: OBSTETRICS & GYNECOLOGY

## 2023-09-14 NOTE — PROGRESS NOTES
HPI: Pt is a 52 y.o.  female who presents for routine annual exam. She has hx of TLH/BSO in 2015 with Dr. Aguilar for BRCA2 mutation.     Last Pap smear: February 2015: Normal    Her oncologic history is:  Patient diagnosed with left breast cancer in 2006. 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 She had left mastectomy in 05/2006 followed by four cycles of   postoperative Adriamycin and Cytoxan therapy.     Decided to have right breast mastectomy in 2016.     On final pathology cervix was BENIGN - no further paps necessary.     Has night sweats. Not everyday but is still having them.     Has 1 daughter and she is BRCA 2 negative.     ROS:  GENERAL: Feeling well overall. Denies fever or chills.   SKIN: Denies rash or lesions.   HEAD: Denies head injury or headache.   NODES: Denies enlarged lymph nodes.   CHEST: Denies chest pain or shortness of breath.   CARDIOVASCULAR: Denies palpitations or left sided chest pain.   ABDOMEN: No abdominal pain, constipation, diarrhea, nausea or vomiting   URINARY: No dysuria, hematuria, or burning on urination.  REPRODUCTIVE: See HPI.   BREASTS: Denies pain, lumps, or nipple discharge.   HEMATOLOGIC: No easy bruisability or excessive bleeding.   MUSCULOSKELETAL: Denies joint pain or swelling.   NEUROLOGIC: Denies syncope or weakness.   PSYCHIATRIC: Denies acute depression or anxiety     PE:   APPEARANCE: Well nourished, well developed, friendly  female in no acute distress.  NODES: no inguinal lymphadenopathy  BREASTS: Symmetrical, no skin changes or visible lesions. No palpable masses, nipple discharge or adenopathy bilaterally. Scarring from mastectomy present.   ABDOMEN: Soft. No tenderness or masses. No distention.   VULVA: No lesions. Normal external female genitalia.  PELVIC: Normal external female genitalia without lesions. Normal hair distribution. Adequate perineal body, normal urethral meatus. Vagina moist and without lesions or discharge. No  significant cystocele or rectocele. Uterus and cervix surgically absent. Vaginal cuff intact and appears normal. Bimanual exam revealed no masses, tenderness or abnormality.      Diagnosis:  1. Encounter for gynecological examination    2. Personal history of breast cancer    3. BRCA2 gene mutation positive        Plan:   - paps no longer necessary.   - Discussed Veozah as option for hot flashes/night sweats       Patient was counseled today on the new ACS guidelines for cervical cytology screening as well as the current recommendations for breast cancer screening. She was counseled to follow up with her PCP for other routine health maintenance.     Follow-up with me in 1 year for routine exam; pap in 3 years.

## 2023-09-15 ENCOUNTER — CLINICAL SUPPORT (OUTPATIENT)
Dept: ALLERGY | Facility: CLINIC | Age: 53
End: 2023-09-15
Payer: COMMERCIAL

## 2023-09-15 DIAGNOSIS — J30.9 CHRONIC ALLERGIC RHINITIS: Primary | ICD-10-CM

## 2023-09-15 PROCEDURE — 99999 PR PBB SHADOW E&M-EST. PATIENT-LVL I: CPT | Mod: PBBFAC,,,

## 2023-09-15 PROCEDURE — 95117 IMMUNOTHERAPY INJECTIONS: CPT | Mod: S$GLB,,, | Performed by: STUDENT IN AN ORGANIZED HEALTH CARE EDUCATION/TRAINING PROGRAM

## 2023-09-15 PROCEDURE — 99999 PR PBB SHADOW E&M-EST. PATIENT-LVL I: ICD-10-PCS | Mod: PBBFAC,,,

## 2023-09-15 PROCEDURE — 95117 PR IMMU2THERAPY, 2+ INJECTIONS: ICD-10-PCS | Mod: S$GLB,,, | Performed by: STUDENT IN AN ORGANIZED HEALTH CARE EDUCATION/TRAINING PROGRAM

## 2023-09-18 ENCOUNTER — CLINICAL SUPPORT (OUTPATIENT)
Dept: ALLERGY | Facility: CLINIC | Age: 53
End: 2023-09-18
Payer: COMMERCIAL

## 2023-09-18 DIAGNOSIS — J30.9 CHRONIC ALLERGIC RHINITIS: Primary | ICD-10-CM

## 2023-09-18 PROCEDURE — 95117 PR IMMU2THERAPY, 2+ INJECTIONS: ICD-10-PCS | Mod: S$GLB,,, | Performed by: ALLERGY & IMMUNOLOGY

## 2023-09-18 PROCEDURE — 95117 IMMUNOTHERAPY INJECTIONS: CPT | Mod: S$GLB,,, | Performed by: ALLERGY & IMMUNOLOGY

## 2023-09-18 PROCEDURE — 99999 PR PBB SHADOW E&M-EST. PATIENT-LVL I: ICD-10-PCS | Mod: PBBFAC,,,

## 2023-09-18 PROCEDURE — 99999 PR PBB SHADOW E&M-EST. PATIENT-LVL I: CPT | Mod: PBBFAC,,,

## 2023-10-23 ENCOUNTER — CLINICAL SUPPORT (OUTPATIENT)
Dept: ALLERGY | Facility: CLINIC | Age: 53
End: 2023-10-23
Payer: COMMERCIAL

## 2023-10-23 DIAGNOSIS — J30.9 CHRONIC ALLERGIC RHINITIS: Primary | ICD-10-CM

## 2023-10-23 PROCEDURE — 99999 PR PBB SHADOW E&M-EST. PATIENT-LVL I: ICD-10-PCS | Mod: PBBFAC,,,

## 2023-10-23 PROCEDURE — 95117 IMMUNOTHERAPY INJECTIONS: CPT | Mod: S$GLB,,, | Performed by: ALLERGY & IMMUNOLOGY

## 2023-10-23 PROCEDURE — 95117 PR IMMU2THERAPY, 2+ INJECTIONS: ICD-10-PCS | Mod: S$GLB,,, | Performed by: ALLERGY & IMMUNOLOGY

## 2023-10-23 PROCEDURE — 99999 PR PBB SHADOW E&M-EST. PATIENT-LVL I: CPT | Mod: PBBFAC,,,

## 2023-11-06 ENCOUNTER — PATIENT MESSAGE (OUTPATIENT)
Dept: OBSTETRICS AND GYNECOLOGY | Facility: CLINIC | Age: 53
End: 2023-11-06
Payer: COMMERCIAL

## 2023-11-13 ENCOUNTER — CLINICAL SUPPORT (OUTPATIENT)
Dept: ALLERGY | Facility: CLINIC | Age: 53
End: 2023-11-13
Payer: COMMERCIAL

## 2023-11-13 DIAGNOSIS — J30.9 CHRONIC ALLERGIC RHINITIS: Primary | ICD-10-CM

## 2023-11-13 PROCEDURE — 99999 PR PBB SHADOW E&M-EST. PATIENT-LVL I: CPT | Mod: PBBFAC,,,

## 2023-11-13 PROCEDURE — 99999 PR PBB SHADOW E&M-EST. PATIENT-LVL I: ICD-10-PCS | Mod: PBBFAC,,,

## 2023-11-13 PROCEDURE — 95117 PR IMMU2THERAPY, 2+ INJECTIONS: ICD-10-PCS | Mod: S$GLB,,, | Performed by: ALLERGY & IMMUNOLOGY

## 2023-11-13 PROCEDURE — 95117 IMMUNOTHERAPY INJECTIONS: CPT | Mod: S$GLB,,, | Performed by: ALLERGY & IMMUNOLOGY

## 2023-12-04 ENCOUNTER — CLINICAL SUPPORT (OUTPATIENT)
Dept: ALLERGY | Facility: CLINIC | Age: 53
End: 2023-12-04
Payer: COMMERCIAL

## 2023-12-04 DIAGNOSIS — J30.9 CHRONIC ALLERGIC RHINITIS: Primary | ICD-10-CM

## 2023-12-04 PROCEDURE — 95117 IMMUNOTHERAPY INJECTIONS: CPT | Mod: S$GLB,,, | Performed by: ALLERGY & IMMUNOLOGY

## 2023-12-04 PROCEDURE — 99999 PR PBB SHADOW E&M-EST. PATIENT-LVL I: ICD-10-PCS | Mod: PBBFAC,,,

## 2023-12-04 PROCEDURE — 99999 PR PBB SHADOW E&M-EST. PATIENT-LVL I: CPT | Mod: PBBFAC,,,

## 2023-12-04 PROCEDURE — 95117 PR IMMU2THERAPY, 2+ INJECTIONS: ICD-10-PCS | Mod: S$GLB,,, | Performed by: ALLERGY & IMMUNOLOGY

## 2023-12-28 ENCOUNTER — CLINICAL SUPPORT (OUTPATIENT)
Dept: ALLERGY | Facility: CLINIC | Age: 53
End: 2023-12-28
Payer: COMMERCIAL

## 2023-12-28 DIAGNOSIS — J30.9 CHRONIC ALLERGIC RHINITIS: Primary | ICD-10-CM

## 2023-12-28 PROCEDURE — 95117 IMMUNOTHERAPY INJECTIONS: CPT | Mod: S$GLB,,, | Performed by: STUDENT IN AN ORGANIZED HEALTH CARE EDUCATION/TRAINING PROGRAM

## 2023-12-28 PROCEDURE — 99999 PR PBB SHADOW E&M-EST. PATIENT-LVL I: CPT | Mod: PBBFAC,,,

## 2023-12-28 PROCEDURE — 95117 PR IMMU2THERAPY, 2+ INJECTIONS: ICD-10-PCS | Mod: S$GLB,,, | Performed by: STUDENT IN AN ORGANIZED HEALTH CARE EDUCATION/TRAINING PROGRAM

## 2023-12-28 PROCEDURE — 99999 PR PBB SHADOW E&M-EST. PATIENT-LVL I: ICD-10-PCS | Mod: PBBFAC,,,

## 2024-01-14 ENCOUNTER — OFFICE VISIT (OUTPATIENT)
Dept: URGENT CARE | Facility: CLINIC | Age: 54
End: 2024-01-14
Payer: COMMERCIAL

## 2024-01-14 VITALS
DIASTOLIC BLOOD PRESSURE: 75 MMHG | HEIGHT: 62 IN | SYSTOLIC BLOOD PRESSURE: 114 MMHG | OXYGEN SATURATION: 99 % | WEIGHT: 102 LBS | BODY MASS INDEX: 18.77 KG/M2 | TEMPERATURE: 98 F | RESPIRATION RATE: 19 BRPM | HEART RATE: 82 BPM

## 2024-01-14 DIAGNOSIS — R05.9 COUGH, UNSPECIFIED TYPE: ICD-10-CM

## 2024-01-14 DIAGNOSIS — J06.9 VIRAL URI WITH COUGH: Primary | ICD-10-CM

## 2024-01-14 LAB
CTP QC/QA: YES
SARS-COV-2 AG RESP QL IA.RAPID: NEGATIVE

## 2024-01-14 PROCEDURE — 87811 SARS-COV-2 COVID19 W/OPTIC: CPT | Mod: QW,S$GLB,,

## 2024-01-14 PROCEDURE — 99203 OFFICE O/P NEW LOW 30 MIN: CPT | Mod: S$GLB,,,

## 2024-01-14 RX ORDER — PROMETHAZINE HYDROCHLORIDE AND DEXTROMETHORPHAN HYDROBROMIDE 6.25; 15 MG/5ML; MG/5ML
5 SYRUP ORAL NIGHTLY PRN
Qty: 180 ML | Refills: 0 | Status: SHIPPED | OUTPATIENT
Start: 2024-01-14 | End: 2024-02-19

## 2024-01-14 RX ORDER — IPRATROPIUM BROMIDE 21 UG/1
2 SPRAY, METERED NASAL 2 TIMES DAILY
Qty: 15.8 ML | Refills: 0 | Status: SHIPPED | OUTPATIENT
Start: 2024-01-14 | End: 2024-02-13

## 2024-01-14 NOTE — PROGRESS NOTES
"Subjective:      Patient ID: Shae Trotter is a 53 y.o. female.    Vitals:  height is 5' 2" (1.575 m) and weight is 46.3 kg (102 lb). Her temperature is 98.3 °F (36.8 °C). Her blood pressure is 114/75 and her pulse is 82. Her respiration is 19 and oxygen saturation is 99%.     Chief Complaint: Cough (Cough, sore throat, congestion - Entered by patient)    Pt is a 54 yo female presenting with cough.  Onset of symptoms was 3 days. Associated symptoms include HA, congestion, PND. Denies CP, SOB, fever, myalgia, chills, N/V/D. Pt reports using OTC Tylenol, Mucinex, benadryl. Recently returned from [place] two days ago.       Cough  This is a new problem. The current episode started in the past 7 days. The problem has been unchanged. The problem occurs every few minutes. The cough is Non-productive. Associated symptoms include headaches, nasal congestion and postnasal drip. Pertinent negatives include no chest pain, chills, ear pain, eye redness, fever, myalgias, rash, sore throat, shortness of breath or wheezing. Exacerbated by: activity. Treatments tried: benadryl, mucinex, tylenol.       Constitution: Negative for activity change, appetite change, chills and fever.   HENT:  Positive for congestion and postnasal drip. Negative for ear pain, sinus pain, sinus pressure and sore throat.         Ear fullness   Neck: Negative for neck pain.   Cardiovascular:  Negative for chest pain and sob on exertion.   Eyes:  Negative for eye trauma, eye discharge, eye itching, eye redness, photophobia and blurred vision.   Respiratory:  Positive for cough. Negative for shortness of breath, wheezing and asthma.    Gastrointestinal:  Negative for abdominal pain, nausea, vomiting, constipation and diarrhea.   Genitourinary:  Negative for dysuria, frequency, urgency, urine decreased and hematuria.   Musculoskeletal:  Negative for pain and muscle ache.   Skin:  Negative for color change, rash and hives.   Allergic/Immunologic: " Negative for seasonal allergies, asthma, hives and sneezing.   Neurological:  Positive for headaches. Negative for dizziness, light-headedness and altered mental status.   Psychiatric/Behavioral:  Negative for altered mental status and confusion.       Past Medical History:   Diagnosis Date    Allergy     taking allx shots since     Breast cancer 2006    breast cancer - tx with mastectomy, chemo - followed by Sunny Basurto    Genetic testing 2014    BRCA2 deleterious mutation    Other screening mammogram 2015    Seasonal allergies        Past Surgical History:   Procedure Laterality Date    COSMETIC SURGERY      right mastectomy after genetic testing returned    D&C hysterscope.      HYSTERECTOMY  4/13/15    Robotic LH/BSO     Left mastectomy and chemo Left 2006    TRAM flap reconstruction     MASTECTOMY Left 2006    TX REMOVAL OF OVARY/TUBE(S)      Right breast cysts removed      tram flap  Left 2006    breast reconstruction.        Family History   Problem Relation Age of Onset    Hyperlipidemia Mother     Arrhythmia Mother         on Digoxin    Cataracts Mother     Diabetes Father     Hyperlipidemia Father     Heart disease Father 64         22 to MI    No Known Problems Brother     No Known Problems Daughter     Allergic rhinitis Son     No Known Problems Brother     Ovarian cancer Neg Hx     Uterine cancer Neg Hx     Breast cancer Neg Hx     Colon cancer Neg Hx     Amblyopia Neg Hx     Blindness Neg Hx     Cancer Neg Hx     Glaucoma Neg Hx     Hypertension Neg Hx     Retinal detachment Neg Hx     Strabismus Neg Hx     Macular degeneration Neg Hx     Stroke Neg Hx     Thyroid disease Neg Hx     Melanoma Neg Hx        Social History     Socioeconomic History    Marital status:    Occupational History    Occupation:    Tobacco Use    Smoking status: Never    Smokeless tobacco: Never   Substance and Sexual Activity    Alcohol use: No      "Alcohol/week: 0.0 standard drinks of alcohol    Drug use: No    Sexual activity: Yes     Partners: Male     Birth control/protection: See Surgical Hx, Partner-Vasectomy     Comment:  "Romeo"   Other Topics Concern    Are you pregnant or think you may be? No    Breast-feeding No   Social History Narrative    [place] with  and daughter 17 and son 29    Not exercising reg     Social Determinants of Health     Financial Resource Strain: Low Risk  (3/22/2023)    Overall Financial Resource Strain (CARDIA)     Difficulty of Paying Living Expenses: Not hard at all   Food Insecurity: No Food Insecurity (3/22/2023)    Hunger Vital Sign     Worried About Running Out of Food in the Last Year: Never true     Ran Out of Food in the Last Year: Never true   Transportation Needs: No Transportation Needs (3/22/2023)    PRAPARE - Transportation     Lack of Transportation (Medical): No     Lack of Transportation (Non-Medical): No   Physical Activity: Inactive (3/22/2023)    Exercise Vital Sign     Days of Exercise per Week: 0 days     Minutes of Exercise per Session: 0 min   Stress: No Stress Concern Present (3/22/2023)    [place] [place] [employer] - Occupational Stress Questionnaire     Feeling of Stress : Only a little   Social Connections: Unknown (3/22/2023)    Social Connection and Isolation Panel [NHANES]     Frequency of Communication with Friends and Family: More than three times a week     Frequency of Social Gatherings with Friends and Family: Three times a week     Active Member of Clubs or Organizations: No     Attends Club or Organization Meetings: Never     Marital Status:    Housing Stability: Unknown (3/22/2023)    Housing Stability Vital Sign     Unable to Pay for Housing in the Last Year: No     Unstable Housing in the Last Year: No       Current Outpatient Medications   Medication Sig Dispense Refill    levocetirizine (XYZAL) 5 MG tablet Take " 1 tablet (5 mg total) by mouth every evening. 90 tablet 3    cetirizine (ZYRTEC) 10 MG tablet Take 2 tablets (20 mg total) by mouth 2 (two) times daily. Start taking 5/15/2022 and continue through 5/17/2022. Take prior to Allergy appointment for 3 days 12 tablet 0    EPINEPHrine (EPIPEN) 0.3 mg/0.3 mL AtIn Inject 0.3 mL (0.3 mg total) into the muscle once for 1 dose 2 each 2    fluocinonide-emollient (FLUOCINONIDE-E) 0.05 % Crea Apply topically 2 (two) times daily on body as needed for eczema. (Patient not taking: Reported on 1/14/2024) 60 g 1    ipratropium (ATROVENT) 21 mcg (0.03 %) nasal spray 2 sprays by Each Nostril route 2 (two) times daily. 15.8 mL 0    promethazine-dextromethorphan (PROMETHAZINE-DM) 6.25-15 mg/5 mL Syrp Take 5 mLs by mouth nightly as needed. 180 mL 0    tretinoin (RETIN-A) 0.025 % cream Compound tretinoin 0.025% / azelaic acid 8% / niacinamide 2% cream. Apply a pea-sized amount to entire face qhs. (Patient not taking: Reported on 1/14/2024) 30 g 5    triamcinolone acetonide 0.1% (KENALOG) 0.1 % cream Apply topically 2 (two) times daily. (Patient not taking: Reported on 1/14/2024) 45 g 0     Current Facility-Administered Medications   Medication Dose Route Frequency Provider Last Rate Last Admin    Allergy Mix   Subcutaneous 1 time in Clinic/Sunny Lagos MD        Allergy Mix   Subcutaneous 1 time in Clinic/Sunny Lagos MD           Review of patient's allergies indicates:   Allergen Reactions    Fish containing products Anaphylaxis    Aspirin      Other reaction(s): Hives    Fruit extracts Hives     Other reaction(s): Unknown  Allergic to most fruits - hives/swelling    Minocycline Other (See Comments)     dizziness    Penicillins Hives    [place] Hives       Objective:     Physical Exam   Constitutional: She is oriented to person, place, and time. She appears well-developed. She is cooperative.  Non-toxic appearance. She does not appear ill. No distress.       Comments:Pt sitting erect on examination table. No acute respiratory distress, no use of accessory muscles, no notice of nasal flaring.        HENT:   Head: Normocephalic and atraumatic.   Ears:   Right Ear: Hearing, external ear and ear canal normal. No tenderness or cerumen not present. Tympanic membrane is not injected and not erythematous. A middle ear effusion is present.   Left Ear: Hearing, external ear and ear canal normal. No tenderness or cerumen not present. Tympanic membrane is erythematous. Tympanic membrane is not injected. A middle ear effusion is present.   Nose: Congestion present. No mucosal edema, rhinorrhea or nasal deformity. No epistaxis. Right sinus exhibits no maxillary sinus tenderness and no frontal sinus tenderness. Left sinus exhibits no maxillary sinus tenderness and no frontal sinus tenderness.   Mouth/Throat: Uvula is midline, oropharynx is clear and moist and mucous membranes are normal. Mucous membranes are moist. No trismus in the jaw. Normal dentition. No uvula swelling. No oropharyngeal exudate, posterior oropharyngeal edema or posterior oropharyngeal erythema. Oropharynx is clear.   Canker sore noted on left side of pharyngeal fossae      Comments: Canker sore noted on left side of pharyngeal fossae  Eyes: Conjunctivae and lids are normal. Pupils are equal, round, and reactive to light. No scleral icterus. Extraocular movement intact   Neck: Trachea normal and phonation normal. Neck supple. No edema present. No erythema present. No neck rigidity present.   Cardiovascular: Normal rate, regular rhythm, normal heart sounds and normal pulses.   Pulmonary/Chest: Effort normal and breath sounds normal. No accessory muscle usage. No apnea, no tachypnea and no bradypnea. No respiratory distress. She has no decreased breath sounds. She has no wheezes. She has no rhonchi.   Lungs clear to auscultation B/L           Comments: Lungs clear to auscultation B/L      Abdominal: Normal  appearance.   Musculoskeletal: Normal range of motion.         General: No deformity. Normal range of motion.   Lymphadenopathy:     She has no cervical adenopathy.        Right cervical: No superficial cervical and no posterior cervical adenopathy present.       Left cervical: No superficial cervical and no posterior cervical adenopathy present.   Neurological: She is alert and oriented to person, place, and time. She exhibits normal muscle tone. Coordination normal.   Skin: Skin is warm, dry, intact, not diaphoretic and not pale.   Psychiatric: Her speech is normal and behavior is normal. Judgment and thought content normal.   Nursing note and vitals reviewed.  Results for orders placed or performed in visit on 01/14/24   SARS Coronavirus 2 Antigen, POCT Manual Read   Result Value Ref Range    SARS Coronavirus 2 Antigen Negative Negative     Acceptable Yes        Assessment:     1. Viral URI with cough    2. Cough, unspecified type        Plan:   I have reviewed the patient chart and pertinent past imaging/labs.     Viral URI with cough  -     ipratropium (ATROVENT) 21 mcg (0.03 %) nasal spray; 2 sprays by Each Nostril route 2 (two) times daily.  Dispense: 15.8 mL; Refill: 0  -     promethazine-dextromethorphan (PROMETHAZINE-DM) 6.25-15 mg/5 mL Syrp; Take 5 mLs by mouth nightly as needed.  Dispense: 180 mL; Refill: 0    Cough, unspecified type  -     SARS Coronavirus 2 Antigen, POCT Manual Read

## 2024-01-14 NOTE — PATIENT INSTRUCTIONS
Fever/Pain: Alternate Tylenol and Ibuprofen as needed every 4-6 hours  Cough: Cough syrup nightly as needed  Nasal congestion/Runny nose: Nasal spray twice daily as needed  Over the counter: Mucinex 1200mg twice daily or Tylenol Cold and Sinus   Monitor for chest pain, shortness of breath, worsening of symptoms, or fever unresponsive to medication.   If no improvement in 5-7 days, please return  Please drink plenty of fluids.  Please get plenty of rest.  Please return here or go to the Emergency Department for any concerns or worsening of condition.  If you were prescribed antibiotics, please take them to completion.  If you were given wait & see antibiotics, please wait 5-7 days before taking them, and only take them if your symptoms have worsened or not improved.  If you do begin taking the antibiotics, please take them to completion.  If you were prescribed a narcotic medication, do not drive or operate heavy equipment or machinery while taking these medications.  If you were given a steroid shot in the clinic and have also been given a prescription for a steroid such as Prednisone or a Medrol Dose Pack, please begin taking them tomorrow.  If you do not have Hypertension or any history of palpitations, it is ok to take over the counter Sudafed or Mucinex D or Allegra-D or Claritin-D or Zyrtec-D.  If you do take one of the above, it is ok to combine that with plain over the counter Mucinex or Allegra or Claritin or Zyrtec.  If for example you are taking Zyrtec -D, you can combine that with Mucinex, but not Mucinex-D.  If you are taking Mucinex-D, you can combine that with plain Allegra or Claritin or Zyrtec.   If you do have Hypertension or palpitations, it is safe to take Coricidin HBP for relief of sinus symptoms.  If not allergic, please take over the counter Tylenol (Acetaminophen) and/or Motrin (Ibuprofen) as directed for control of pain and/or fever.  Please follow up with your primary care doctor or  specialist as needed.    If you  smoke, please stop smoking.

## 2024-01-17 ENCOUNTER — DOCUMENTATION ONLY (OUTPATIENT)
Dept: ALLERGY | Facility: CLINIC | Age: 54
End: 2024-01-17
Payer: COMMERCIAL

## 2024-01-19 ENCOUNTER — TELEPHONE (OUTPATIENT)
Dept: ALLERGY | Facility: CLINIC | Age: 54
End: 2024-01-19
Payer: COMMERCIAL

## 2024-01-19 NOTE — TELEPHONE ENCOUNTER
Left pt. A voice mail message, allergy injection appt. Was canceled.Allergy extract vials were  and discarded. Pt. Also canceled 24 appt. With , appt. Was needed for annual visit for allergy injection. Pt. Had 24 injection appt. Were was also canceled, vials became  last week.

## 2024-01-23 ENCOUNTER — OFFICE VISIT (OUTPATIENT)
Dept: ALLERGY | Facility: CLINIC | Age: 54
End: 2024-01-23
Payer: COMMERCIAL

## 2024-01-23 VITALS — BODY MASS INDEX: 19.63 KG/M2 | WEIGHT: 106.69 LBS | HEIGHT: 62 IN

## 2024-01-23 DIAGNOSIS — J30.9 CHRONIC ALLERGIC RHINITIS: Primary | ICD-10-CM

## 2024-01-23 DIAGNOSIS — J06.9 UPPER RESPIRATORY INFECTION, ACUTE: ICD-10-CM

## 2024-01-23 PROCEDURE — 99999 PR PBB SHADOW E&M-EST. PATIENT-LVL III: CPT | Mod: PBBFAC,,, | Performed by: STUDENT IN AN ORGANIZED HEALTH CARE EDUCATION/TRAINING PROGRAM

## 2024-01-23 PROCEDURE — 3008F BODY MASS INDEX DOCD: CPT | Mod: CPTII,S$GLB,, | Performed by: STUDENT IN AN ORGANIZED HEALTH CARE EDUCATION/TRAINING PROGRAM

## 2024-01-23 PROCEDURE — 1159F MED LIST DOCD IN RCRD: CPT | Mod: CPTII,S$GLB,, | Performed by: STUDENT IN AN ORGANIZED HEALTH CARE EDUCATION/TRAINING PROGRAM

## 2024-01-23 PROCEDURE — 99214 OFFICE O/P EST MOD 30 MIN: CPT | Mod: S$GLB,,, | Performed by: STUDENT IN AN ORGANIZED HEALTH CARE EDUCATION/TRAINING PROGRAM

## 2024-01-23 NOTE — PROGRESS NOTES
[facility]ERGY [facility]MM[facility]OLOGY CLINIC     HISTORY OF PRESENT ILLNESS     Referral from: No ref. provider found    HPI: Shae Trotter is a 53 y.o. female   Before allergy shots, she had itchy eyes and ears, and nose was always congested, and had hives that she would have when outside and allergens high. Most days was having problems. Medications were insufficient to control symptoms, and therefore she started on allergy shots. She has been on for years. As long as she stays on the injections her symptoms 95% controlled. When she misses an injection symptoms return. Overall better quality of life on allergy shots.     She is currently having fluid in her ears (seen at urgent care), increased nasal congestion, and a slightly sore throat.     She had one reaction to allergy shots before transferring care to us, but has since been stable without any significant problems with the injections.     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 60 g 1    ipratropium (ATROVENT) 21 mcg (0.03 %) nasal spray 2 sprays by Each Nostril route 2 (two) times daily. 15.8 mL 0    levocetirizine (XYZAL) 5 MG tablet Take 1 tablet (5 mg total) by mouth every evening. 90 tablet 3    promethazine-dextromethorphan (PROMETHAZINE-DM) 6.25-15 mg/5 mL Syrp Take 5 mLs by mouth nightly as needed. 180 mL 0    tretinoin (RETIN-A) 0.025 % cream Compound tretinoin 0.025% / azelaic acid 8% / niacinamide 2% cream. Apply a pea-sized amount to entire face qhs. 30 g 5    triamcinolone acetonide 0.1% (KENALOG) 0.1 % cream Apply topically 2 (two) times daily. 45 g 0    cetirizine (ZYRTEC) 10 MG tablet Take 2 tablets (20 mg total) by mouth 2 (two) times daily. Start taking 5/15/2022 and continue through 5/17/2022. Take prior to Allergy appointment for 3 days (Patient not taking: Reported on 1/23/2024) 12 tablet 0     Current Facility-Administered Medications on File Prior to Visit   Medication Dose Route Frequency Provider Last Rate Last Admin    Allergy Mix   Subcutaneous 1 time in Clinic/Sunny Lagos MD        Allergy Mix   Subcutaneous 1 time in Clinic/Sunny Lagos MD           SurgHx:  Past Surgical History:   Procedure Laterality Date    COSMETIC SURGERY      right mastectomy after genetic testing returned    D&C hysterscope.      HYSTERECTOMY  4/13/15    Robotic LH/BSO     Left mastectomy and chemo Left 2006    TRAM flap reconstruction     MASTECTOMY Left 2006    [place] REMOVAL OF OVARY/TUBE(S)      Right breast cysts removed      tram flap  Left 2006    breast reconstruction.       PHYSICAL EXAM   VS: Ht 5' 2" (1.575 m)   Wt 48.4 kg (106 lb 11.2 oz)   LMP 07/10/2012   BMI 19.52 kg/m²   GENERAL: Alert, NAD, well-appearing, cooperative  EYES: no conjunctival injection, no infraorbital shiners  EARS: external auditory canals normal B/L, TM normal B/L but with small fluid level on the left (right is clear)  NOSE: NT 3+ pink B/L, no " polyps  ORAL: MMM, no ulcers, no thrush, no cobblestoning. Small tonsillith on the left  NECK: no LAD  DERM: no rashes, no skin breaks     ASSESSMENT & PLAN     Shae Trotter is a 53 y.o. female with     Chronic allergic rhinitis    Upper respiratory infection, acute      Given excellent control of chromic allergic rhinitis once starting immunotherapy, will continue immunotherapy without adjustments. Follow up in 1 year (virtual visit ok) to monitor.    Fluid in ears improved since urgent care visit, mild rhinitis likely connected, most likely trigger is viral etiology with no specific management needed; ok to continue Atrovent intranasal spray to reduce rhinorrhea.   Sore throat may be due to tonsillith on the left; no erythema or signs of infection or inflammation.

## 2024-01-30 ENCOUNTER — IMMUNIZATION (OUTPATIENT)
Dept: INTERNAL MEDICINE | Facility: CLINIC | Age: 54
End: 2024-01-30
Payer: COMMERCIAL

## 2024-01-30 DIAGNOSIS — Z23 NEED FOR VACCINATION: Primary | ICD-10-CM

## 2024-01-30 PROCEDURE — 90480 ADMN SARSCOV2 VAC 1/ONLY CMP: CPT | Mod: S$GLB,,, | Performed by: INTERNAL MEDICINE

## 2024-01-30 PROCEDURE — 91322 SARSCOV2 VAC 50 MCG/0.5ML IM: CPT | Mod: S$GLB,,, | Performed by: INTERNAL MEDICINE

## 2024-02-15 ENCOUNTER — TELEPHONE (OUTPATIENT)
Dept: ALLERGY | Facility: CLINIC | Age: 54
End: 2024-02-15
Payer: COMMERCIAL

## 2024-02-15 NOTE — TELEPHONE ENCOUNTER
Spoke with pt. Informed her as of today we did not received her new allergy extract vials. Pt. Will be notified when we receive them in clinic.

## 2024-02-15 NOTE — TELEPHONE ENCOUNTER
----- Message from Erick Montoya MA sent at 2/15/2024 10:58 AM CST -----  Regarding: FW: appt  Contact: [phone]    ----- Message -----  From: Rolan Whipple  Sent: 2/15/2024  10:48 AM CST  To: #  Subject: appt                                             Pt is calling to discuss an appt with the nurse for allergy injection and would like to know if he knew serum is in . Please call would like to discuss

## 2024-02-28 ENCOUNTER — CLINICAL SUPPORT (OUTPATIENT)
Dept: NUTRITION | Facility: CLINIC | Age: 54
End: 2024-02-28
Payer: COMMERCIAL

## 2024-02-28 DIAGNOSIS — Z71.3 NUTRITIONAL COUNSELING: Primary | ICD-10-CM

## 2024-02-28 PROCEDURE — 97802 MEDICAL NUTRITION INDIV IN: CPT | Mod: 95,,,

## 2024-02-28 NOTE — PROGRESS NOTES
"The patient location is: [place]  The chief complaint leading to consultation is: HLD    Visit type: audiovisual    Face to Face time with patient: 60 Minutes  75 minutes of total time spent on the encounter, which includes face to face time and non-face to face time preparing to see the patient (eg, review of tests), Obtaining and/or reviewing separately obtained history, Documenting clinical information in the electronic or other health record, Independently interpreting results (not separately reported) and communicating results to the patient/family/caregiver, or Care coordination (not separately reported).         Each patient to whom he or she provides medical services by telemedicine is:  (1) informed of the relationship between the physician and patient and the respective role of any other health care provider with respect to management of the patient; and (2) notified that he or she may decline to receive medical services by telemedicine and may withdraw from such care at any time.    Notes: Nutrition Assessment    Visit Type: employee wellness consult  Session Time:  1 Hour  Reason for MNT visit: Pt in for education and nutrition counseling regarding HLP.       Age: 53 y.o.  Wt:   Wt Readings from Last 1 Encounters:   01/23/24 48.4 kg (106 lb 11.2 oz)     Ht:   Ht Readings from Last 1 Encounters:   01/23/24 5' 2" (1.575 m)     BMI:   BMI Readings from Last 1 Encounters:   01/23/24 19.52 kg/m²       Client states:  She was diagnosed with high cholesterol about a year ago -- decided she did not want to take medication so made some diet and lifestyle changes. Shae was able to reduce her total cholesterol, but she would like more clear guidance on how she should be eating to prevent her need for medication. She usually walks for 30-45 minutes once/week. Her job is fairly sedentary, but she does her best to move around throughout the day.     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         Hx of breast cancer         Neck muscle spasm         H/O bilateral mastectomy         Mixed hyperlipidemia         Decreased strength of trunk and back            Past Medical History:   Diagnosis Date    Allergy     taking allx shots since 2006    Breast cancer 2006    breast cancer - tx with mastectomy, chemo - followed by Sunny Basurto    Genetic testing 11/2014    BRCA2 deleterious mutation    Other screening mammogram 6/24/2015    Seasonal allergies        Past Surgical History:   Procedure Laterality Date    COSMETIC SURGERY      right mastectomy after genetic testing returned    D&C hysterscope.      HYSTERECTOMY  4/13/15    Robotic LH/BSO     Left mastectomy and chemo Left 2006    TRAM flap reconstruction     MASTECTOMY Left 2006    MN REMOVAL OF OVARY/TUBE(S)      Right breast cysts removed      tram flap  Left 2006    breast reconstruction.           Medications    Prior to Admission medications    Medication Sig Start Date End Date Taking? Authorizing Provider   cetirizine (ZYRTEC) 10 MG tablet Take 2 tablets (20 mg total) by mouth 2 (two) times daily. Start taking 5/15/2022 and continue through 5/17/2022. Take prior to Allergy appointment for 3 days  Patient not taking: Reported on 1/23/2024 5/10/22 4/24/23  Beau Gonzalez MD   EPINEPHrine (EPIPEN) 0.3 mg/0.3 mL AtIn Inject 0.3 mL (0.3 mg total) into the muscle once for 1 dose 6/14/22 1/23/24  [place]  Sunny MCNAMARA MD   fluocinonide-emollient (FLUOCINONIDE-E) 0.05 % Crea Apply topically 2 (two) times daily on body as needed for eczema. 8/8/23   Margarita Barone MD   levocetirizine (XYZAL) 5 MG tablet Take 1 tablet (5 mg total) by mouth every evening. 2/22/23 2/22/24  Beau Gonzalez MD   tretinoin (RETIN-A) 0.025 % cream Compound tretinoin 0.025% / azelaic acid 8% / niacinamide 2% cream. Apply a pea-sized amount to entire face qhs. 8/8/23   Margarita Barone MD   triamcinolone acetonide 0.1% (KENALOG) 0.1 % cream Apply topically 2 (two) times daily. 3/23/23   Dalila Phoenix MD        Vitamins, Minerals, and/or Supplements:  None     Food Allergies or Intolerances:  allergic to fish, certain fruits, and walnuts     Social History    Marital status:      Social History     Tobacco Use    Smoking status: Never     Passive exposure: Never    Smokeless tobacco: Never   Substance Use Topics    Alcohol use: No     Alcohol/week: 0.0 standard drinks of alcohol     Current Alcohol use: 2-4 times/month, 1-2 drinks    Lab Reports   Reviewed and noted    Lab Results   Component Value Date    EQVLGTEO29AK 30 07/28/2010    TSH 2.453 03/24/2023    FREET4 1.05 05/09/2011    CRP 6.1 03/11/2009    SEDRATE 35 (H) 03/11/2009    AST 16 03/24/2023    ALT 14 03/24/2023    HDL 63 08/02/2023    LDLCALC 162.2 (H) 08/02/2023    TRIG 69 08/02/2023    HGBA1C 5.3 03/24/2023    HGBA1C 5.3 06/16/2020    HGBA1C 5.3 07/28/2010         BP Readings from Last 1 Encounters:   01/14/24 114/75        24-hour Recall    Breakfast: sometimes skips // shredded wheat + grapefruit // [employer] Greek yogurt + KIND granola // hong, eggs, and grits  Snack: sometimes snacks on pretzels + PB  Lunch: leftovers from night before // salad with veg, chicken, avocado, nuts + seeds, small amount of dressing  Dinner: ground turkey meat sauce + spaghetti // roast chicken with rice and veg       Beverages  How much water consumed (per day?): (P) 6   Cups of milk  consumed (per day?): (P) 0       Cups of  juice consumed (per day?): (P) 0   Number of supplement shakes (per day?): (P) 0       Number of cups of coffee (per day?): (P) 1   Coffee: (P) Milk   Tea:: (P) I do not drink tea   Cups of soda consumed (per day?): (P) 0   Other non-alcoholic beverages consumed (per day?): (P) 0          LIFESTYLE FACTORS    Dinning out: 1-2 x per week, mostly restaurants    Meal preparation/shopping: Who does the grocery shopping:: (P) Me Who prepares the meals: (P) Spouse     Sleep: fair    Stress Level: moderate    Support System:  spouse and friends    Exercise Regimen: lightly active (low intensity, 1-3 days a week)      Diagnosis    Altered nutrition related laboratory values related to excessive saturated fat intake as evidenced by 24-hour recall.      Intervention    Estimated Energy Requirements:   Calories: 3459-5928   Protein: 105-130 grams  Carbohydrates: 120-140 grams  Total Fat: 45-55 grams  Baseline for fluids: 51 fl ounces + sweat loss    Recommendations & Goals:  Patient goals and recommendations are tailored to the specific patient's needs, readiness to change, lifestyle, culture, skills, resources, & abilities. Strategies to help achieve these nutrition-related goals were discussed which can include but are not limited to SMART goal setting & mindful eating.     Aim for a minimum of 7 hours sleep   Exercise 60 minutes most days  Eat breakfast within 1-2 hours of waking up  Try not to skip any meals or snacks, not going more than 3-4 hours without eating   At each meal and snack, try to include a source of fiber + lean protein + healthy fat     Written Materials Provided  These resources are intended to assist the patient in making it easier to choose recommended options when eating out & to identify better-for-you brands at the grocery store:    Meal Planning Guide with recommendations discussed along with portion sizes and a customized meal plan   Fueling Well On-the-Go  Food Guide  Eat Fit Shopping Guide  Lifestyle Nutrition Meal Guide  RD contact information    Goals:  1.  Eat every 3-4 hours.   2.  Incorporate a source of fiber, lean protein, and/or healthy fat with each meal and snack.   3.  Limit intake of saturated fat to no more than 14 grams per day.       Monitoring/Evaluation    Monitor the following:  Weight  Sleep  Stress Management  Movement  Nutrient intake in reference to meal plan    Communicated with healthcare provider and documented plan for referral to appropriate agency/healthcare provider as needed    Supervising Physician: Arjun Krause MD    Patient motivation, anticipated barriers, expected compliance: Patient is motivated and has verbalized understanding and intent to comply.     Comprehension: good     Follow-up: annually

## 2024-02-28 NOTE — PATIENT INSTRUCTIONS
Name: Shae Trotter   Date: 02/28/2024    Recommended daily energy requirements to reach your goals:  4205-2765 Calories  105-130 grams Protein  120-140 grams Carbohydrates  45-55 grams Fat  51 ounces of fluid per day

## 2024-03-02 ENCOUNTER — NURSE TRIAGE (OUTPATIENT)
Dept: ADMINISTRATIVE | Facility: CLINIC | Age: 54
End: 2024-03-02
Payer: COMMERCIAL

## 2024-03-02 ENCOUNTER — OFFICE VISIT (OUTPATIENT)
Dept: URGENT CARE | Facility: CLINIC | Age: 54
End: 2024-03-02
Payer: COMMERCIAL

## 2024-03-02 VITALS
DIASTOLIC BLOOD PRESSURE: 72 MMHG | TEMPERATURE: 99 F | HEART RATE: 96 BPM | HEIGHT: 62 IN | OXYGEN SATURATION: 97 % | SYSTOLIC BLOOD PRESSURE: 108 MMHG | RESPIRATION RATE: 17 BRPM | WEIGHT: 105 LBS | BODY MASS INDEX: 19.32 KG/M2

## 2024-03-02 DIAGNOSIS — R35.0 FREQUENCY OF URINATION: Primary | ICD-10-CM

## 2024-03-02 DIAGNOSIS — N30.00 ACUTE CYSTITIS WITHOUT HEMATURIA: ICD-10-CM

## 2024-03-02 DIAGNOSIS — R30.0 DYSURIA: ICD-10-CM

## 2024-03-02 LAB
BILIRUB UR QL STRIP: NEGATIVE
GLUCOSE UR QL STRIP: NEGATIVE
KETONES UR QL STRIP: NEGATIVE
LEUKOCYTE ESTERASE UR QL STRIP: NEGATIVE
PH, POC UA: 7 (ref 5–8)
POC BLOOD, URINE: POSITIVE
POC NITRATES, URINE: NEGATIVE
PROT UR QL STRIP: NEGATIVE
SP GR UR STRIP: 1.01 (ref 1–1.03)
UROBILINOGEN UR STRIP-ACNC: NORMAL (ref 0.1–1.1)

## 2024-03-02 PROCEDURE — 87086 URINE CULTURE/COLONY COUNT: CPT | Performed by: FAMILY MEDICINE

## 2024-03-02 PROCEDURE — 99214 OFFICE O/P EST MOD 30 MIN: CPT | Mod: S$GLB,,, | Performed by: FAMILY MEDICINE

## 2024-03-02 PROCEDURE — 81003 URINALYSIS AUTO W/O SCOPE: CPT | Mod: QW,S$GLB,, | Performed by: FAMILY MEDICINE

## 2024-03-02 RX ORDER — NITROFURANTOIN 25; 75 MG/1; MG/1
100 CAPSULE ORAL 2 TIMES DAILY
Qty: 10 CAPSULE | Refills: 0 | Status: SHIPPED | OUTPATIENT
Start: 2024-03-02 | End: 2024-03-07

## 2024-03-02 RX ORDER — NITROFURANTOIN 25; 75 MG/1; MG/1
100 CAPSULE ORAL 2 TIMES DAILY
Qty: 10 CAPSULE | Refills: 0 | Status: SHIPPED | OUTPATIENT
Start: 2024-03-02 | End: 2024-03-02

## 2024-03-02 NOTE — PROGRESS NOTES
"Subjective:      Patient ID: Shae Tortter is a 53 y.o. female.    Vitals:  height is 5' 2" (1.575 m) and weight is 47.6 kg (105 lb). Her oral temperature is 98.6 °F (37 °C). Her blood pressure is 108/72 and her pulse is 96. Her respiration is 17 and oxygen saturation is 97%.     Chief Complaint: Urinary Frequency    Pt presents with c/o dysuria and increased frequency/urgency for 2 days. Denies fever, chills, abd pain, back pain, N/V, blood on urine, vaginal discharge, weakness/dizziness.         Urinary Frequency   This is a new problem. Episode onset: thursday. The problem has been unchanged. The pain is at a severity of 0/10. Associated symptoms include frequency.       Genitourinary:  Positive for frequency.      Objective:     Physical Exam   Constitutional: She is oriented to person, place, and time. She appears well-developed.   HENT:   Head: Normocephalic and atraumatic.   Ears:   Right Ear: Tympanic membrane, external ear and ear canal normal. impacted cerumen  Left Ear: Tympanic membrane, external ear and ear canal normal. impacted cerumen  Nose: Nose normal. No rhinorrhea, nasal deformity or congestion. No epistaxis.   Mouth/Throat: Oropharynx is clear and moist and mucous membranes are normal. No posterior oropharyngeal erythema.   Eyes: Lids are normal.   Neck: Trachea normal and phonation normal. Neck supple.   Cardiovascular: Normal pulses.   No murmur heard.  Pulmonary/Chest: Effort normal. No stridor. No respiratory distress. She has no wheezes. She has no rhonchi. She has no rales.   Abdominal: Normal appearance and bowel sounds are normal. She exhibits no distension. Soft. There is no abdominal tenderness. There is no left CVA tenderness and no right CVA tenderness.   Neurological: She is alert, oriented to person, place, and time and at baseline.   Skin: Skin is warm, dry and intact.   Psychiatric: Her speech is normal and behavior is normal.   Nursing note and vitals " reviewed.      Assessment:     1. Frequency of urination    2. Acute cystitis without hematuria    3. Dysuria        Plan:   Discussed exam findings/results/diagnosis/plan with patient in clinic. Advised to f/u with PCP within 2-5 days. ER precautions given if symptoms get any worse. All questions answered. Patient verbally understood and agreed with treatment plan.     Frequency of urination  -     POCT Urinalysis, Dipstick, Automated, W/O Scope    Acute cystitis without hematuria  -     Urine culture    Dysuria    Other orders  -     Discontinue: nitrofurantoin, macrocrystal-monohydrate, (MACROBID) 100 MG capsule; Take 1 capsule (100 mg total) by mouth 2 (two) times daily. for 5 days  Dispense: 10 capsule; Refill: 0  -     nitrofurantoin, macrocrystal-monohydrate, (MACROBID) 100 MG capsule; Take 1 capsule (100 mg total) by mouth 2 (two) times daily. for 5 days  Dispense: 10 capsule; Refill: 0

## 2024-03-02 NOTE — TELEPHONE ENCOUNTER
Reason for Disposition   Urinating more frequently than usual (i.e., frequency)    Additional Information   Negative: Shock suspected (e.g., cold/pale/clammy skin, too weak to stand, low BP, rapid pulse)   Negative: Sounds like a life-threatening emergency to the triager   Negative: [1] Unable to urinate (or only a few drops) > 4 hours AND [2] bladder feels very full (e.g., palpable bladder or strong urge to urinate)   Negative: [1] Decreased urination and [2] drinking very little AND [2] dehydration suspected (e.g., dark urine, no urine > 12 hours, very dry mouth, very lightheaded)   Negative: Patient sounds very sick or weak to the triager   Negative: Fever > 100.4 F (38.0 C)   Negative: Side (flank) or lower back pain present    Protocols used: Urinary Symptoms-A-AH  Pt called re poss UTI. since thurs has urge to pass urine freq. Felt pressure on thurs pm & cont thru today. really strong urge to go to pass urine.  pt denies pain or burning with urinating. no blood in urine. Afeb. Rec to see dr within 24 hours. Pt agrees.

## 2024-03-02 NOTE — PROGRESS NOTES
"Subjective:      Patient ID: Shae Trotter is a 53 y.o. female.    Vitals:  height is 5' 2" (1.575 m) and weight is 47.6 kg (105 lb). Her oral temperature is 98.6 °F (37 °C). Her blood pressure is 108/72 and her pulse is 96. Her respiration is 17 and oxygen saturation is 97%.     Chief Complaint: No chief complaint on file.    Dysuria     Genitourinary:  Positive for dysuria.    Objective:     Physical Exam    Assessment:     No diagnosis found.    Plan:       There are no diagnoses linked to this encounter.                "

## 2024-03-04 ENCOUNTER — PATIENT MESSAGE (OUTPATIENT)
Dept: ALLERGY | Facility: CLINIC | Age: 54
End: 2024-03-04
Payer: COMMERCIAL

## 2024-03-04 LAB — BACTERIA UR CULT: NORMAL

## 2024-03-05 ENCOUNTER — PATIENT MESSAGE (OUTPATIENT)
Dept: INTERNAL MEDICINE | Facility: CLINIC | Age: 54
End: 2024-03-05
Payer: COMMERCIAL

## 2024-03-06 PROCEDURE — 95165 ANTIGEN THERAPY SERVICES: CPT | Mod: S$GLB,,, | Performed by: ALLERGY & IMMUNOLOGY

## 2024-04-10 ENCOUNTER — PATIENT MESSAGE (OUTPATIENT)
Dept: ALLERGY | Facility: CLINIC | Age: 54
End: 2024-04-10
Payer: COMMERCIAL

## 2024-04-11 ENCOUNTER — TELEPHONE (OUTPATIENT)
Dept: ALLERGY | Facility: CLINIC | Age: 54
End: 2024-04-11
Payer: COMMERCIAL

## 2024-04-11 NOTE — TELEPHONE ENCOUNTER
Patient has new red (1:1) vials.  Last dose as of today was 105 days ago of Red(1:1) 0.5ml.    Spoke to Dr. Mujica who advised that patient should come into clinic today, pt could not come in today but will be here Monday.    Dr Mujica stated that either he or Dr. Cordova will need to talk to patient regarding the number of days since last injection and the risk of reaction due to days between injections.  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

## 2024-04-15 ENCOUNTER — CLINICAL SUPPORT (OUTPATIENT)
Dept: ALLERGY | Facility: CLINIC | Age: 54
End: 2024-04-15
Payer: COMMERCIAL

## 2024-04-15 DIAGNOSIS — J30.9 CHRONIC ALLERGIC RHINITIS: Primary | ICD-10-CM

## 2024-04-15 PROCEDURE — 95117 IMMUNOTHERAPY INJECTIONS: CPT | Mod: S$GLB,,, | Performed by: STUDENT IN AN ORGANIZED HEALTH CARE EDUCATION/TRAINING PROGRAM

## 2024-04-15 NOTE — PROGRESS NOTES
Due to days between last injection 109 days. We reviewed risks/benefits of starting over, rush, vs. Lowest dose of red. These would all be reasonable options depending on her risk tolerance. She would like to proceed with 0.05mL red. She has an epipen with her today and knows when and how to use; she has previously had anaphylaxis.    Allergy injections today will be given by Dr. Cordova.  854 am Claritin Redi tab was given by Dr. Cordova prior to injections    SQ-LT UPPER ARM  ALLERGY injection Red vial # 1/3 DM/C/D/ 0.05 ml given,tolerated well. Pt waited 30 minutes. No reaction noted  SQ-RT UPPER ARM  allergy injection Red vial # 3/3 W/TR/ 0.05 ml given,tolerated well. Pt. Waited 30 minutes, no reaction noted    Vial 2/3 had not been ordered, and thus no vial 2 injection was given.  On further review this appears to have been due to elm availability.   We have reached out to pharmacy to assist with the reorder of vial 2.

## 2024-04-18 ENCOUNTER — PATIENT MESSAGE (OUTPATIENT)
Dept: DERMATOLOGY | Facility: CLINIC | Age: 54
End: 2024-04-18
Payer: COMMERCIAL

## 2024-04-22 ENCOUNTER — CLINICAL SUPPORT (OUTPATIENT)
Dept: ALLERGY | Facility: CLINIC | Age: 54
End: 2024-04-22
Payer: COMMERCIAL

## 2024-04-22 DIAGNOSIS — J30.9 CHRONIC ALLERGIC RHINITIS: Primary | ICD-10-CM

## 2024-04-22 PROCEDURE — 99999 PR PBB SHADOW E&M-EST. PATIENT-LVL I: CPT | Mod: PBBFAC,,,

## 2024-04-22 PROCEDURE — 95117 IMMUNOTHERAPY INJECTIONS: CPT | Mod: S$GLB,,, | Performed by: STUDENT IN AN ORGANIZED HEALTH CARE EDUCATION/TRAINING PROGRAM

## 2024-04-30 ENCOUNTER — CLINICAL SUPPORT (OUTPATIENT)
Dept: ALLERGY | Facility: CLINIC | Age: 54
End: 2024-04-30
Payer: COMMERCIAL

## 2024-04-30 DIAGNOSIS — J30.9 CHRONIC ALLERGIC RHINITIS: Primary | ICD-10-CM

## 2024-04-30 PROCEDURE — 95117 IMMUNOTHERAPY INJECTIONS: CPT | Mod: S$GLB,,, | Performed by: STUDENT IN AN ORGANIZED HEALTH CARE EDUCATION/TRAINING PROGRAM

## 2024-04-30 PROCEDURE — 99999 PR PBB SHADOW E&M-EST. PATIENT-LVL I: CPT | Mod: PBBFAC,,,

## 2024-04-30 NOTE — PROGRESS NOTES
9am pt. Vial 2/3 was received in clinic,as per  pt. Was given Zyrtec 10 mg /10 ml by E.Bosworth,LPN prior to new injection from this vial. Injection was given and timer was reset for 30 minutes. Pt. Did well today on this injection and will return to clinic on Friday for injection only from vial 2/3

## 2024-05-03 ENCOUNTER — CLINICAL SUPPORT (OUTPATIENT)
Dept: ALLERGY | Facility: CLINIC | Age: 54
End: 2024-05-03
Payer: COMMERCIAL

## 2024-05-03 DIAGNOSIS — J30.9 CHRONIC ALLERGIC RHINITIS: Primary | ICD-10-CM

## 2024-05-03 PROCEDURE — 95115 IMMUNOTHERAPY ONE INJECTION: CPT | Mod: S$GLB,,, | Performed by: STUDENT IN AN ORGANIZED HEALTH CARE EDUCATION/TRAINING PROGRAM

## 2024-05-03 PROCEDURE — 99999 PR PBB SHADOW E&M-EST. PATIENT-LVL I: CPT | Mod: PBBFAC,,,

## 2024-05-06 ENCOUNTER — CLINICAL SUPPORT (OUTPATIENT)
Dept: ALLERGY | Facility: CLINIC | Age: 54
End: 2024-05-06
Payer: COMMERCIAL

## 2024-05-06 DIAGNOSIS — J30.9 CHRONIC ALLERGIC RHINITIS: Primary | ICD-10-CM

## 2024-05-06 PROCEDURE — 95117 IMMUNOTHERAPY INJECTIONS: CPT | Mod: S$GLB,,, | Performed by: STUDENT IN AN ORGANIZED HEALTH CARE EDUCATION/TRAINING PROGRAM

## 2024-05-06 PROCEDURE — 99999 PR PBB SHADOW E&M-EST. PATIENT-LVL I: CPT | Mod: PBBFAC,,,

## 2024-05-13 ENCOUNTER — CLINICAL SUPPORT (OUTPATIENT)
Dept: ALLERGY | Facility: CLINIC | Age: 54
End: 2024-05-13
Payer: COMMERCIAL

## 2024-05-13 DIAGNOSIS — J30.9 CHRONIC ALLERGIC RHINITIS: Primary | ICD-10-CM

## 2024-05-13 PROCEDURE — 95117 IMMUNOTHERAPY INJECTIONS: CPT | Mod: S$GLB,,, | Performed by: STUDENT IN AN ORGANIZED HEALTH CARE EDUCATION/TRAINING PROGRAM

## 2024-05-13 PROCEDURE — 99999 PR PBB SHADOW E&M-EST. PATIENT-LVL I: CPT | Mod: PBBFAC,,,

## 2024-05-15 ENCOUNTER — CLINICAL SUPPORT (OUTPATIENT)
Dept: ALLERGY | Facility: CLINIC | Age: 54
End: 2024-05-15
Payer: COMMERCIAL

## 2024-05-15 ENCOUNTER — PATIENT MESSAGE (OUTPATIENT)
Dept: INTERNAL MEDICINE | Facility: CLINIC | Age: 54
End: 2024-05-15
Payer: COMMERCIAL

## 2024-05-15 DIAGNOSIS — J30.9 CHRONIC ALLERGIC RHINITIS: Primary | ICD-10-CM

## 2024-05-15 DIAGNOSIS — E78.2 MIXED HYPERLIPIDEMIA: ICD-10-CM

## 2024-05-15 DIAGNOSIS — Z00.00 ANNUAL PHYSICAL EXAM: Primary | ICD-10-CM

## 2024-05-15 PROCEDURE — 99999 PR PBB SHADOW E&M-EST. PATIENT-LVL I: CPT | Mod: PBBFAC,,,

## 2024-05-15 PROCEDURE — 95115 IMMUNOTHERAPY ONE INJECTION: CPT | Mod: S$GLB,,, | Performed by: STUDENT IN AN ORGANIZED HEALTH CARE EDUCATION/TRAINING PROGRAM

## 2024-05-15 NOTE — PROGRESS NOTES
Pt. Only received injection today from vial 2/3, after today all vials will be on the same dose. No local reaction noted

## 2024-05-16 ENCOUNTER — LAB VISIT (OUTPATIENT)
Dept: LAB | Facility: OTHER | Age: 54
End: 2024-05-16
Attending: NURSE PRACTITIONER
Payer: COMMERCIAL

## 2024-05-16 DIAGNOSIS — Z00.00 ANNUAL PHYSICAL EXAM: ICD-10-CM

## 2024-05-16 DIAGNOSIS — E78.2 MIXED HYPERLIPIDEMIA: ICD-10-CM

## 2024-05-16 LAB
ALBUMIN SERPL BCP-MCNC: 4.1 G/DL (ref 3.5–5.2)
ALP SERPL-CCNC: 79 U/L (ref 55–135)
ALT SERPL W/O P-5'-P-CCNC: 17 U/L (ref 10–44)
ANION GAP SERPL CALC-SCNC: 9 MMOL/L (ref 8–16)
AST SERPL-CCNC: 19 U/L (ref 10–40)
BASOPHILS # BLD AUTO: 0 K/UL (ref 0–0.2)
BASOPHILS NFR BLD: 0 % (ref 0–1.9)
BILIRUB SERPL-MCNC: 0.4 MG/DL (ref 0.1–1)
BUN SERPL-MCNC: 15 MG/DL (ref 6–20)
CALCIUM SERPL-MCNC: 9.6 MG/DL (ref 8.7–10.5)
CHLORIDE SERPL-SCNC: 104 MMOL/L (ref 95–110)
CHOLEST SERPL-MCNC: 272 MG/DL (ref 120–199)
CHOLEST/HDLC SERPL: 3.2 {RATIO} (ref 2–5)
CO2 SERPL-SCNC: 27 MMOL/L (ref 23–29)
CREAT SERPL-MCNC: 0.8 MG/DL (ref 0.5–1.4)
DIFFERENTIAL METHOD BLD: ABNORMAL
EOSINOPHIL # BLD AUTO: 0 K/UL (ref 0–0.5)
EOSINOPHIL NFR BLD: 0 % (ref 0–8)
ERYTHROCYTE [DISTWIDTH] IN BLOOD BY AUTOMATED COUNT: 14.3 % (ref 11.5–14.5)
EST. GFR  (NO RACE VARIABLE): >60 ML/MIN/1.73 M^2
ESTIMATED AVG GLUCOSE: 105 MG/DL (ref 68–131)
GLUCOSE SERPL-MCNC: 100 MG/DL (ref 70–110)
HBA1C MFR BLD: 5.3 % (ref 4–5.6)
HCT VFR BLD AUTO: 42.1 % (ref 37–48.5)
HDLC SERPL-MCNC: 85 MG/DL (ref 40–75)
HDLC SERPL: 31.3 % (ref 20–50)
HGB BLD-MCNC: 12.9 G/DL (ref 12–16)
IMM GRANULOCYTES # BLD AUTO: 0.01 K/UL (ref 0–0.04)
IMM GRANULOCYTES NFR BLD AUTO: 0.2 % (ref 0–0.5)
LDLC SERPL CALC-MCNC: 173.2 MG/DL (ref 63–159)
LYMPHOCYTES # BLD AUTO: 2.8 K/UL (ref 1–4.8)
LYMPHOCYTES NFR BLD: 48 % (ref 18–48)
MCH RBC QN AUTO: 26.4 PG (ref 27–31)
MCHC RBC AUTO-ENTMCNC: 30.6 G/DL (ref 32–36)
MCV RBC AUTO: 86 FL (ref 82–98)
MONOCYTES # BLD AUTO: 0.3 K/UL (ref 0.3–1)
MONOCYTES NFR BLD: 5.9 % (ref 4–15)
NEUTROPHILS # BLD AUTO: 2.6 K/UL (ref 1.8–7.7)
NEUTROPHILS NFR BLD: 45.9 % (ref 38–73)
NONHDLC SERPL-MCNC: 187 MG/DL
NRBC BLD-RTO: 0 /100 WBC
PLATELET # BLD AUTO: 356 K/UL (ref 150–450)
PMV BLD AUTO: 9.8 FL (ref 9.2–12.9)
POTASSIUM SERPL-SCNC: 4 MMOL/L (ref 3.5–5.1)
PROT SERPL-MCNC: 7.7 G/DL (ref 6–8.4)
RBC # BLD AUTO: 4.88 M/UL (ref 4–5.4)
SODIUM SERPL-SCNC: 140 MMOL/L (ref 136–145)
TRIGL SERPL-MCNC: 69 MG/DL (ref 30–150)
TSH SERPL DL<=0.005 MIU/L-ACNC: 1.76 UIU/ML (ref 0.4–4)
WBC # BLD AUTO: 5.75 K/UL (ref 3.9–12.7)

## 2024-05-16 PROCEDURE — 80061 LIPID PANEL: CPT | Performed by: NURSE PRACTITIONER

## 2024-05-16 PROCEDURE — 85025 COMPLETE CBC W/AUTO DIFF WBC: CPT | Performed by: NURSE PRACTITIONER

## 2024-05-16 PROCEDURE — 84443 ASSAY THYROID STIM HORMONE: CPT | Performed by: NURSE PRACTITIONER

## 2024-05-16 PROCEDURE — 36415 COLL VENOUS BLD VENIPUNCTURE: CPT | Performed by: NURSE PRACTITIONER

## 2024-05-16 PROCEDURE — 80053 COMPREHEN METABOLIC PANEL: CPT | Performed by: NURSE PRACTITIONER

## 2024-05-16 PROCEDURE — 83036 HEMOGLOBIN GLYCOSYLATED A1C: CPT | Performed by: NURSE PRACTITIONER

## 2024-05-20 ENCOUNTER — CLINICAL SUPPORT (OUTPATIENT)
Dept: ALLERGY | Facility: CLINIC | Age: 54
End: 2024-05-20
Payer: COMMERCIAL

## 2024-05-20 DIAGNOSIS — J30.9 CHRONIC ALLERGIC RHINITIS: Primary | ICD-10-CM

## 2024-05-20 PROCEDURE — 95117 IMMUNOTHERAPY INJECTIONS: CPT | Mod: S$GLB,,, | Performed by: STUDENT IN AN ORGANIZED HEALTH CARE EDUCATION/TRAINING PROGRAM

## 2024-05-20 PROCEDURE — 99999 PR PBB SHADOW E&M-EST. PATIENT-LVL I: CPT | Mod: PBBFAC,,,

## 2024-05-21 ENCOUNTER — OFFICE VISIT (OUTPATIENT)
Dept: INTERNAL MEDICINE | Facility: CLINIC | Age: 54
End: 2024-05-21
Payer: COMMERCIAL

## 2024-05-21 VITALS
OXYGEN SATURATION: 98 % | WEIGHT: 106.5 LBS | SYSTOLIC BLOOD PRESSURE: 96 MMHG | DIASTOLIC BLOOD PRESSURE: 80 MMHG | HEART RATE: 90 BPM | BODY MASS INDEX: 19.6 KG/M2 | HEIGHT: 62 IN

## 2024-05-21 DIAGNOSIS — Z85.3 HX OF BREAST CANCER: ICD-10-CM

## 2024-05-21 DIAGNOSIS — Z15.09 BRCA2 POSITIVE: ICD-10-CM

## 2024-05-21 DIAGNOSIS — Z12.12 ENCOUNTER FOR COLORECTAL CANCER SCREENING: ICD-10-CM

## 2024-05-21 DIAGNOSIS — Z00.00 ANNUAL PHYSICAL EXAM: Primary | ICD-10-CM

## 2024-05-21 DIAGNOSIS — Z12.11 ENCOUNTER FOR COLORECTAL CANCER SCREENING: ICD-10-CM

## 2024-05-21 DIAGNOSIS — Z13.6 ENCOUNTER FOR SCREENING FOR CARDIOVASCULAR DISORDERS: ICD-10-CM

## 2024-05-21 DIAGNOSIS — Z85.3 PERSONAL HISTORY OF BREAST CANCER: ICD-10-CM

## 2024-05-21 DIAGNOSIS — E78.2 MIXED HYPERLIPIDEMIA: ICD-10-CM

## 2024-05-21 DIAGNOSIS — J30.1 NON-SEASONAL ALLERGIC RHINITIS DUE TO POLLEN: ICD-10-CM

## 2024-05-21 DIAGNOSIS — J30.89 SEASONAL ALLERGIC RHINITIS DUE TO OTHER ALLERGIC TRIGGER: ICD-10-CM

## 2024-05-21 DIAGNOSIS — Z15.01 BRCA2 POSITIVE: ICD-10-CM

## 2024-05-21 PROCEDURE — 3044F HG A1C LEVEL LT 7.0%: CPT | Mod: CPTII,S$GLB,, | Performed by: NURSE PRACTITIONER

## 2024-05-21 PROCEDURE — 3079F DIAST BP 80-89 MM HG: CPT | Mod: CPTII,S$GLB,, | Performed by: NURSE PRACTITIONER

## 2024-05-21 PROCEDURE — 3074F SYST BP LT 130 MM HG: CPT | Mod: CPTII,S$GLB,, | Performed by: NURSE PRACTITIONER

## 2024-05-21 PROCEDURE — 1159F MED LIST DOCD IN RCRD: CPT | Mod: CPTII,S$GLB,, | Performed by: NURSE PRACTITIONER

## 2024-05-21 PROCEDURE — 99999 PR PBB SHADOW E&M-EST. PATIENT-LVL IV: CPT | Mod: PBBFAC,,, | Performed by: NURSE PRACTITIONER

## 2024-05-21 PROCEDURE — 3008F BODY MASS INDEX DOCD: CPT | Mod: CPTII,S$GLB,, | Performed by: NURSE PRACTITIONER

## 2024-05-21 PROCEDURE — 99396 PREV VISIT EST AGE 40-64: CPT | Mod: S$GLB,,, | Performed by: NURSE PRACTITIONER

## 2024-05-21 NOTE — PROGRESS NOTES
Internal Medicine Annual Exam       CHIEF COMPLAINT     The patient, Shae Trotter, who is a 53 y.o. female  with Hx breast CA (dx'ed in 2006 on L and s/p L mastectomy, then had abnormal mmg in 2015 and had +BRCA2 testing so had R sided mastectomy as well, s/p chemo, no XRT, LN neg per hx, also had total hyst after BRCA testing done), allergic rhinitis c chronic urticaria presents for an annual exam.    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     H/o breast cancer with bilateral mastectomy and chemo   Also total hysto - Pt is BRCA2 positive     Elevated TC and LDL- enrolled in digital HLD program and changed diet   The 10-year ASCVD risk score (Jagdish GREEN, et al., 2019) is: 0.9%    Values used to calculate the score:      Age: 53 years      Sex: Female      Is Non- : Yes      Diabetic: No      Tobacco smoker: No      Systolic Blood Pressure: 96 mmHg      Is BP treated: No      HDL Cholesterol: 85 mg/dL      Total Cholesterol: 272 mg/dL     Followed by derm     HM-  CRCS- stool 6/20/2023  Shingles - defer   Tdap- defer     Sleep - no issues   Exercise - no routinely     Past Medical History:  Past Medical History:   Diagnosis Date    Allergy     taking allx shots since 2006    Breast cancer 2006    breast cancer - tx with mastectomy, chemo - followed by Sunny Basurto    Genetic testing 11/2014    BRCA2 deleterious mutation    Other screening mammogram 6/24/2015    Seasonal allergies        Past Surgical History:   Procedure Laterality Date    COSMETIC SURGERY      right mastectomy after genetic testing returned    D&C hysterscope.      HYSTERECTOMY  4/13/15    Robotic LH/BSO     Left mastectomy and chemo Left 2006    TRAM flap reconstruction     MASTECTOMY Left 2006    OK REMOVAL OF OVARY/TUBE(S)      Right breast cysts removed      tram flap  Left 2006    breast  "reconstruction.         Family History   Problem Relation Name Age of Onset    Hyperlipidemia Mother      Arrhythmia Mother          on Digoxin    Cataracts Mother      Diabetes Father      Hyperlipidemia Father      Heart disease Father  64         2/2 to MI    No Known Problems Brother      No Known Problems Daughter      Allergic rhinitis Son      No Known Problems Brother      Ovarian cancer Neg Hx      Uterine cancer Neg Hx      Breast cancer Neg Hx      Colon cancer Neg Hx      Amblyopia Neg Hx      Blindness Neg Hx      Cancer Neg Hx      Glaucoma Neg Hx      Hypertension Neg Hx      Retinal detachment Neg Hx      Strabismus Neg Hx      Macular degeneration Neg Hx      Stroke Neg Hx      Thyroid disease Neg Hx      Melanoma Neg Hx          Social History     Socioeconomic History    Marital status:    Occupational History    Occupation:    Tobacco Use    Smoking status: Never     Passive exposure: Never    Smokeless tobacco: Never   Substance and Sexual Activity    Alcohol use: No     Alcohol/week: 0.0 standard drinks of alcohol    Drug use: No    Sexual activity: Yes     Partners: Male     Birth control/protection: See Surgical Hx, Partner-Vasectomy     Comment:  "Romeo"   Other Topics Concern    Are you pregnant or think you may be? No    Breast-feeding No   Social History Narrative    From [place]    Living [place] with  and daughter 17 and son 29    Not exercising reg     Social Determinants of Health     Financial Resource Strain: Low Risk  (2024)    Overall Financial Resource Strain (CARDIA)     Difficulty of Paying Living Expenses: Not hard at all   Food Insecurity: No Food Insecurity (2024)    Hunger Vital Sign     Worried About Running Out of Food in the Last Year: Never true     Ran Out of Food in the Last Year: Never true   Transportation Needs: No Transportation Needs (2024)    PRAPARE - Transportation     Lack of Transportation (Medical): No     " Lack of Transportation (Non-Medical): No   Physical Activity: Insufficiently Active (2/28/2024)    Exercise Vital Sign     Days of Exercise per Week: 1 day     Minutes of Exercise per Session: 30 min   Stress: No Stress Concern Present (2/28/2024)    [employer] - Occupational Stress Questionnaire     Feeling of Stress : Only a little   Housing Stability: Low Risk  (2/28/2024)    Housing Stability Vital Sign     Unable to Pay for Housing in the Last Year: No     Number of Places Lived in the Last Year: 1     Unstable Housing in the Last Year: No        Social History     Tobacco Use   Smoking Status Never    Passive exposure: Never   Smokeless Tobacco Never        Allergies as of 05/21/2024 - Reviewed 05/21/2024   Allergen Reaction Noted    Fish containing products Anaphylaxis 09/06/2017    Aspirin  06/19/2012    Fruit extracts Hives 08/01/2012    Minocycline Other (See Comments) 08/01/2019    Penicillins Hives 06/19/2012    [place] Hives 07/19/2023          Home Medications:  Prior to Admission medications    Medication Sig Start Date End Date Taking? Authorizing Provider   fluocinonide-emollient (FLUOCINONIDE-E) 0.05 % Crea Apply topically 2 (two) times daily on body as needed for eczema. 8/8/23  Yes Margarita Barone MD   tretinoin (RETIN-A) 0.025 % cream Compound tretinoin 0.025% / azelaic acid 8% / niacinamide 2% cream. Apply a pea-sized amount to entire face qhs. 8/8/23  Yes Margarita Barone MD   triamcinolone acetonide 0.1% (KENALOG) 0.1 % cream Apply topically 2 (two) times daily. 3/23/23  Yes Dalila Phoenix MD   cetirizine (ZYRTEC) 10 MG tablet Take 2 tablets (20 mg total) by mouth 2 (two) times daily. Start taking 5/15/2022 and continue through 5/17/2022. Take prior to Allergy appointment for 3 days  Patient not taking: Reported on 1/23/2024 5/10/22 4/24/23  Beau Gonzalez MD   EPINEPHrine (EPIPEN) 0.3 mg/0.3 mL AtIn Inject 0.3 mL (0.3 mg total) into the muscle once for  "1 dose 6/14/22 1/23/24  Sunny Mujica MD   levocetirizine (XYZAL) 5 MG tablet Take 1 tablet (5 mg total) by mouth every evening. 2/22/23 2/22/24  Beau Gonzalez MD       Review of Systems:  Review of Systems   Constitutional:  Positive for diaphoresis. Negative for chills, fatigue, fever and unexpected weight change.   HENT:  Negative for congestion, hearing loss, rhinorrhea and sinus pressure.    Eyes:  Negative for pain, redness and visual disturbance.   Respiratory:  Negative for cough, shortness of breath and wheezing.    Cardiovascular:  Negative for chest pain and palpitations.   Gastrointestinal:  Negative for abdominal distention, abdominal pain, constipation, diarrhea, nausea and vomiting.   Endocrine: Negative for polydipsia, polyphagia and polyuria.   Genitourinary:  Negative for dysuria, frequency, urgency and vaginal discharge.   Musculoskeletal:  Negative for arthralgias, gait problem and myalgias.   Skin:  Negative for color change and rash.   Allergic/Immunologic: Positive for environmental allergies. Negative for immunocompromised state.   Neurological:  Negative for dizziness, weakness, light-headedness and headaches.   Hematological:  Negative for adenopathy. Does not bruise/bleed easily.   Psychiatric/Behavioral:  Negative for confusion, dysphoric mood and sleep disturbance. The patient is not nervous/anxious.        Health Maintainence:   Immunizations:  Health Maintenance         Date Due Completion Date    TETANUS VACCINE Never done ---    Shingles Vaccine (1 of 2) Never done ---    Colorectal Cancer Screening 06/20/2024 6/20/2023    Lipid Panel 05/16/2029 5/16/2024             PHYSICAL EXAM     BP 96/80 (BP Location: Left arm, Patient Position: Sitting, BP Method: Medium (Manual))   Pulse 90   Ht 5' 2" (1.575 m)   Wt 48.3 kg (106 lb 7.7 oz)   LMP 07/10/2012   SpO2 98%   BMI 19.48 kg/m²  Body mass index is 19.48 kg/m².    Physical Exam  Vitals reviewed.   Constitutional:       " Appearance: She is well-developed.   HENT:      Head: Normocephalic.      Right Ear: External ear normal.      Left Ear: External ear normal.      Nose: Nose normal.      Mouth/Throat:      Pharynx: No oropharyngeal exudate.   Eyes:      Pupils: Pupils are equal, round, and reactive to light.   Neck:      Thyroid: No thyromegaly.      Vascular: No JVD.      Trachea: No tracheal deviation.   Cardiovascular:      Rate and Rhythm: Normal rate and regular rhythm.      Heart sounds: Normal heart sounds. No murmur heard.     No friction rub. No gallop.   Pulmonary:      Effort: Pulmonary effort is normal. No respiratory distress.      Breath sounds: Normal breath sounds. No wheezing or rales.   Abdominal:      General: Bowel sounds are normal. There is no distension.      Palpations: Abdomen is soft.      Tenderness: There is no abdominal tenderness.   Musculoskeletal:         General: No tenderness. Normal range of motion.      Cervical back: Neck supple.   Lymphadenopathy:      Cervical: No cervical adenopathy.   Skin:     General: Skin is warm and dry.      Findings: No rash.   Neurological:      Mental Status: She is alert and oriented to person, place, and time.   Psychiatric:         Behavior: Behavior normal.         LABS     Lab Results   Component Value Date    HGBA1C 5.3 05/16/2024     CMP  Sodium   Date Value Ref Range Status   05/16/2024 140 136 - 145 mmol/L Final     Potassium   Date Value Ref Range Status   05/16/2024 4.0 3.5 - 5.1 mmol/L Final     Chloride   Date Value Ref Range Status   05/16/2024 104 95 - 110 mmol/L Final     CO2   Date Value Ref Range Status   05/16/2024 27 23 - 29 mmol/L Final     Glucose   Date Value Ref Range Status   05/16/2024 100 70 - 110 mg/dL Final     BUN   Date Value Ref Range Status   05/16/2024 15 6 - 20 mg/dL Final     Creatinine   Date Value Ref Range Status   05/16/2024 0.8 0.5 - 1.4 mg/dL Final     Calcium   Date Value Ref Range Status   05/16/2024 9.6 8.7 - 10.5 mg/dL  Final     Total Protein   Date Value Ref Range Status   05/16/2024 7.7 6.0 - 8.4 g/dL Final     Albumin   Date Value Ref Range Status   05/16/2024 4.1 3.5 - 5.2 g/dL Final     Total Bilirubin   Date Value Ref Range Status   05/16/2024 0.4 0.1 - 1.0 mg/dL Final     Comment:     For infants and newborns, interpretation of results should be based  on gestational age, weight and in agreement with clinical  observations.    Premature Infant recommended reference ranges:  Up to 24 hours.............<8.0 mg/dL  Up to 48 hours............<12.0 mg/dL  3-5 days..................<15.0 mg/dL  6-29 days.................<15.0 mg/dL       Alkaline Phosphatase   Date Value Ref Range Status   05/16/2024 79 55 - 135 U/L Final     AST   Date Value Ref Range Status   05/16/2024 19 10 - 40 U/L Final     ALT   Date Value Ref Range Status   05/16/2024 17 10 - 44 U/L Final     Anion Gap   Date Value Ref Range Status   05/16/2024 9 8 - 16 mmol/L Final     eGFR if    Date Value Ref Range Status   06/16/2020 >60.0 >60 mL/min/1.73 m^2 Final     eGFR if non    Date Value Ref Range Status   06/16/2020 >60.0 >60 mL/min/1.73 m^2 Final     Comment:     Calculation used to obtain the estimated glomerular filtration  rate (eGFR) is the CKD-EPI equation.        Lab Results   Component Value Date    WBC 5.75 05/16/2024    HGB 12.9 05/16/2024    HCT 42.1 05/16/2024    MCV 86 05/16/2024     05/16/2024     Lab Results   Component Value Date    CHOL 272 (H) 05/16/2024    CHOL 239 (H) 08/02/2023    CHOL 274 (H) 03/24/2023     Lab Results   Component Value Date    HDL 85 (H) 05/16/2024    HDL 63 08/02/2023    HDL 82 (H) 03/24/2023     Lab Results   Component Value Date    LDLCALC 173.2 (H) 05/16/2024    LDLCALC 162.2 (H) 08/02/2023    LDLCALC 180.6 (H) 03/24/2023     Lab Results   Component Value Date    TRIG 69 05/16/2024    TRIG 69 08/02/2023    TRIG 57 03/24/2023     Lab Results   Component Value Date    CHOLHDL 31.3  05/16/2024    CHOLHDL 26.4 08/02/2023    CHOLHDL 29.9 03/24/2023     Lab Results   Component Value Date    TSH 1.760 05/16/2024       ASSESSMENT/PLAN     Shae Trotter is a 53 y.o. female    Annual physical exam- All age and gender related screenings discussed     Mixed hyperlipidemia- reviewed FLP. Has family history of heart disease. DIscussed risk and benefits of treatment, pt is reluctant. Will send for Calcium scoring. Continue healthy diet and advised to start exercise   -     CT Cardiac Scoring; Future; Expected date: 05/21/2024    Personal history of breast cancer- stable will monitor     Hx of breast cancer- stable. Will monitor     BRCA2 positive- stable. Will monitor     Non-seasonal allergic rhinitis due to pollen    Seasonal allergic rhinitis due to other allergic trigger    Encounter for colorectal cancer screening  -     Cologuard Screening (Multitarget Stool DNA); Future; Expected date: 05/21/2024    Encounter for screening for cardiovascular disorders  -     CT Cardiac Scoring; Future; Expected date: 05/21/2024      Follow up with PCP in 1 year     Uma SARGENT, APRN, FNP-c   Department of Internal Medicine - [facility]  8:05 AM

## 2024-05-23 ENCOUNTER — PROCEDURE VISIT (OUTPATIENT)
Dept: DERMATOLOGY | Facility: CLINIC | Age: 54
End: 2024-05-23
Payer: COMMERCIAL

## 2024-05-23 ENCOUNTER — OFFICE VISIT (OUTPATIENT)
Dept: DERMATOLOGY | Facility: CLINIC | Age: 54
End: 2024-05-23
Payer: COMMERCIAL

## 2024-05-23 DIAGNOSIS — L72.0 MILIA: ICD-10-CM

## 2024-05-23 DIAGNOSIS — L82.1 SEBORRHEIC KERATOSIS: Primary | ICD-10-CM

## 2024-05-23 DIAGNOSIS — Z41.1 ENCOUNTER FOR COSMETIC PROCEDURE: Primary | ICD-10-CM

## 2024-05-23 DIAGNOSIS — L82.1 SEBORRHEIC KERATOSIS: ICD-10-CM

## 2024-05-23 PROCEDURE — 17110 DESTRUCTION B9 LES UP TO 14: CPT | Mod: CSM,S$GLB,, | Performed by: DERMATOLOGY

## 2024-05-23 PROCEDURE — 99212 OFFICE O/P EST SF 10 MIN: CPT | Mod: S$GLB,,, | Performed by: DERMATOLOGY

## 2024-05-23 PROCEDURE — 3044F HG A1C LEVEL LT 7.0%: CPT | Mod: CPTII,S$GLB,, | Performed by: DERMATOLOGY

## 2024-05-23 PROCEDURE — 99499 UNLISTED E&M SERVICE: CPT | Mod: CSM,S$GLB,, | Performed by: DERMATOLOGY

## 2024-05-23 NOTE — PROGRESS NOTES
Patient Information  Name: Shae Trotter  : 1970  MRN: [id number]     Referring Physician:  No ref. provider found   Primary Care Physician:  Dalila Phoenix MD   Date of Visit: 2024      Subjective:     History of Present lllness:    Shea Trotter is a 53 y.o. female who presents with a chief complaint of spot.  Location: right cheek  Duration: present for years, but has changed over the past few months  Signs/Symptoms: growing, sometimes itchy  Exacerbating factors: none  Relieving factors/Prior treatments: none    Patient was last seen: 2023.  Prior notes by myself reviewed.   Clinical documentation obtained by nursing staff reviewed.    Review of Systems    Objective:   Physical Exam   Constitutional: She appears well-developed and well-nourished. No distress.   Neurological: She is alert and oriented to person, place, and time. She is not disoriented.   Psychiatric: She has a normal mood and affect.   Skin:   Areas Examined (abnormalities noted in diagram):   Head / Face Inspection Performed            Diagram Legend     Erythematous scaling macule/papule c/w actinic keratosis       Vascular papule c/w angioma      Pigmented verrucoid papule/plaque c/w seborrheic keratosis      Yellow umbilicated papule c/w sebaceous hyperplasia      Irregularly shaped tan macule c/w lentigo     1-2 mm smooth white papules consistent with Milia      Movable subcutaneous cyst with punctum c/w epidermal inclusion cyst      Subcutaneous movable cyst c/w pilar cyst      Firm pink to brown papule c/w dermatofibroma      Pedunculated fleshy papule(s) c/w skin tag(s)      Evenly pigmented macule c/w junctional nevus     Mildly variegated pigmented, slightly irregular-bordered macule c/w mildly atypical nevus      Flesh colored to evenly pigmented papule c/w intradermal nevus       Pink pearly papule/plaque c/w basal cell carcinoma      Erythematous hyperkeratotic cursted plaque c/w SCC      Surgical scar  with no sign of skin cancer recurrence      Open and closed comedones      Inflammatory papules and pustules      Verrucoid papule consistent consistent with wart     Erythematous eczematous patches and plaques     Dystrophic onycholytic nail with subungual debris c/w onychomycosis     Umbilicated papule    Erythematous-base heme-crusted tan verrucoid plaque consistent with inflamed seborrheic keratosis     Erythematous Silvery Scaling Plaque c/w Psoriasis     See annotation    No images are attached to the encounter or orders placed in the encounter.      [] Data reviewed  [] Prior external notes reviewed  [] Independent review of test  [] Management discussed with another provider  [] Independent historian    Assessment / Plan:        Seborrheic keratosis  These are benign, inherited growths without a malignant potential. Reassurance given to patient. No treatment is necessary.    Milia  This is a small, benign cyst. Reassurance provided. No treatment is necessary unless it is symptomatic. These may resolve on their own.             Follow up for any new problems or changing lesions.      Margarita Barone MD, FAAD  [facility]

## 2024-05-23 NOTE — PROGRESS NOTES
CRYOSURGERY PROCEDURE NOTE    DIAGNOSIS: seborrheic keratoses    LOCATION: Face    Discussed with the patient that this procedure is not covered by insurance because treatment of these benign lesions is considered cosmetic. The patient would like to pursue treatment. She understands that she is responsible for the bill and agrees to pay.     Risk, benefits, and alternatives of cryosurgery are discussed with the patient, including but not limited to the risks of hypopigmentation, hyperpigmentation, scar, infection, recurrence of lesion(s), development of new lesion(s), and need for additional treatment of the lesion(s). Verbal consent obtained from patient. Liquid nitrogen cryosurgery was lightly applied to 3 lesion(s) to produce a freeze injury. Counseled patient that blisters may form, and instructed patient on wound care with gentle cleansing and use of Vaseline ointment to keep moist until healed. Handout was provided, and patient was instructed to return to clinic in 1-2 months if lesions do not completely resolve.    Follow up as needed.

## 2024-05-27 ENCOUNTER — CLINICAL SUPPORT (OUTPATIENT)
Dept: ALLERGY | Facility: CLINIC | Age: 54
End: 2024-05-27
Payer: COMMERCIAL

## 2024-05-27 DIAGNOSIS — J30.9 CHRONIC ALLERGIC RHINITIS: Primary | ICD-10-CM

## 2024-05-27 PROCEDURE — 99999 PR PBB SHADOW E&M-EST. PATIENT-LVL I: CPT | Mod: PBBFAC,,,

## 2024-05-27 PROCEDURE — 95117 IMMUNOTHERAPY INJECTIONS: CPT | Mod: S$GLB,,, | Performed by: STUDENT IN AN ORGANIZED HEALTH CARE EDUCATION/TRAINING PROGRAM

## 2024-05-28 ENCOUNTER — PATIENT MESSAGE (OUTPATIENT)
Dept: INTERNAL MEDICINE | Facility: CLINIC | Age: 54
End: 2024-05-28
Payer: COMMERCIAL

## 2024-05-28 ENCOUNTER — HOSPITAL ENCOUNTER (OUTPATIENT)
Dept: RADIOLOGY | Facility: OTHER | Age: 54
Discharge: HOME | End: 2024-05-28
Attending: NURSE PRACTITIONER
Payer: COMMERCIAL

## 2024-05-28 DIAGNOSIS — E78.2 MIXED HYPERLIPIDEMIA: ICD-10-CM

## 2024-05-28 DIAGNOSIS — Z13.6 ENCOUNTER FOR SCREENING FOR CARDIOVASCULAR DISORDERS: ICD-10-CM

## 2024-05-28 PROCEDURE — 75571 CT HRT W/O DYE W/CA TEST: CPT | Mod: 26,,, | Performed by: RADIOLOGY

## 2024-05-28 PROCEDURE — 75571 CT HRT W/O DYE W/CA TEST: CPT | Mod: TC

## 2024-05-31 ENCOUNTER — TELEPHONE (OUTPATIENT)
Dept: ALLERGY | Facility: CLINIC | Age: 54
End: 2024-05-31
Payer: COMMERCIAL

## 2024-05-31 NOTE — TELEPHONE ENCOUNTER
Pt. Request to move allergy injection appointment from Monday 6/3/24 to Tuesday 6/4/24. Appointment was changed.

## 2024-06-04 ENCOUNTER — CLINICAL SUPPORT (OUTPATIENT)
Dept: ALLERGY | Facility: CLINIC | Age: 54
End: 2024-06-04
Payer: COMMERCIAL

## 2024-06-04 DIAGNOSIS — J30.9 CHRONIC ALLERGIC RHINITIS: Primary | ICD-10-CM

## 2024-06-04 PROCEDURE — 99999 PR PBB SHADOW E&M-EST. PATIENT-LVL I: CPT | Mod: PBBFAC,,,

## 2024-06-04 PROCEDURE — 95117 IMMUNOTHERAPY INJECTIONS: CPT | Mod: S$GLB,,, | Performed by: STUDENT IN AN ORGANIZED HEALTH CARE EDUCATION/TRAINING PROGRAM

## 2024-06-05 ENCOUNTER — PATIENT MESSAGE (OUTPATIENT)
Dept: OBSTETRICS AND GYNECOLOGY | Facility: CLINIC | Age: 54
End: 2024-06-05
Payer: COMMERCIAL

## 2024-06-05 ENCOUNTER — PATIENT MESSAGE (OUTPATIENT)
Dept: INTERNAL MEDICINE | Facility: CLINIC | Age: 54
End: 2024-06-05
Payer: COMMERCIAL

## 2024-06-05 DIAGNOSIS — N95.1 SYMPTOMATIC MENOPAUSAL OR FEMALE CLIMACTERIC STATES: Primary | ICD-10-CM

## 2024-06-07 ENCOUNTER — LAB VISIT (OUTPATIENT)
Dept: LAB | Facility: OTHER | Age: 54
End: 2024-06-07
Attending: OBSTETRICS & GYNECOLOGY
Payer: COMMERCIAL

## 2024-06-07 DIAGNOSIS — N95.1 SYMPTOMATIC MENOPAUSAL OR FEMALE CLIMACTERIC STATES: ICD-10-CM

## 2024-06-07 LAB
ESTRADIOL SERPL-MCNC: <10 PG/ML
FSH SERPL-ACNC: 97.41 MIU/ML
LH SERPL-ACNC: 27.2 MIU/ML
PROGEST SERPL-MCNC: 0.2 NG/ML

## 2024-06-07 PROCEDURE — 82670 ASSAY OF TOTAL ESTRADIOL: CPT | Performed by: OBSTETRICS & GYNECOLOGY

## 2024-06-07 PROCEDURE — 36415 COLL VENOUS BLD VENIPUNCTURE: CPT | Performed by: OBSTETRICS & GYNECOLOGY

## 2024-06-07 PROCEDURE — 84144 ASSAY OF PROGESTERONE: CPT | Performed by: OBSTETRICS & GYNECOLOGY

## 2024-06-07 PROCEDURE — 84402 ASSAY OF FREE TESTOSTERONE: CPT | Performed by: OBSTETRICS & GYNECOLOGY

## 2024-06-07 PROCEDURE — 83002 ASSAY OF GONADOTROPIN (LH): CPT | Performed by: OBSTETRICS & GYNECOLOGY

## 2024-06-07 PROCEDURE — 83001 ASSAY OF GONADOTROPIN (FSH): CPT | Performed by: OBSTETRICS & GYNECOLOGY

## 2024-06-10 ENCOUNTER — PATIENT MESSAGE (OUTPATIENT)
Dept: INTERNAL MEDICINE | Facility: CLINIC | Age: 54
End: 2024-06-10
Payer: COMMERCIAL

## 2024-06-10 LAB — TESTOST FREE SERPL-MCNC: 0.5 PG/ML

## 2024-06-11 ENCOUNTER — CLINICAL SUPPORT (OUTPATIENT)
Dept: ALLERGY | Facility: CLINIC | Age: 54
End: 2024-06-11
Payer: COMMERCIAL

## 2024-06-11 DIAGNOSIS — T78.2XXD ANAPHYLAXIS, SUBSEQUENT ENCOUNTER: Primary | ICD-10-CM

## 2024-06-11 DIAGNOSIS — T45.0X5A ALLERGIC REACTION AFTER ALLERGEN IMMUNOTHERAPY: ICD-10-CM

## 2024-06-11 DIAGNOSIS — J30.9 CHRONIC ALLERGIC RHINITIS: ICD-10-CM

## 2024-06-11 DIAGNOSIS — T80.89XA ALLERGIC REACTION AFTER ALLERGEN IMMUNOTHERAPY: ICD-10-CM

## 2024-06-11 PROCEDURE — 99999 PR PBB SHADOW E&M-EST. PATIENT-LVL I: CPT | Mod: PBBFAC,,,

## 2024-06-11 PROCEDURE — 99215 OFFICE O/P EST HI 40 MIN: CPT | Mod: S$GLB,,, | Performed by: STUDENT IN AN ORGANIZED HEALTH CARE EDUCATION/TRAINING PROGRAM

## 2024-06-11 PROCEDURE — 95117 IMMUNOTHERAPY INJECTIONS: CPT | Mod: S$GLB,,, | Performed by: STUDENT IN AN ORGANIZED HEALTH CARE EDUCATION/TRAINING PROGRAM

## 2024-06-11 RX ORDER — EPINEPHRINE 1 MG/ML
0.5 INJECTION, SOLUTION, CONCENTRATE INTRAVENOUS
Status: SHIPPED | OUTPATIENT
Start: 2024-06-11

## 2024-06-11 NOTE — PROGRESS NOTES
Lasr injection was given at 806 am  808 am pt. Was fanning self and stated she fells hot, ice pack was given and brought to exam room and  called to assess pt.  810 am  assess pt., pt. Became very pale, clamy and was brought to the exam table. Adrenalin 0.5 ml was given in n left thigh IM by .  812 am pt. Reports feeling nauseated, hot,and  dizzy.  820 am pt. Is being monitored by Uma Herman LPN and , pt. Stated, that after receiving the first injection she felt hot but by the third injection she became hot, felt dizzy, nauseated and that the room was in slow motion and did not know if she was going to vomited or used the bathroom.  830 am pt. Continues to be monitored by FEROZ Ellison, Reports no change, still feeling hot, dizzy nauseated.foot of bed was elevated   9 am pt. Reports not feeling any better, V/S taken B/P 96/76 left arm and 100/78 in right arm.  931 am pt. Feeling better but tried, Was released to self, escorted to her car by FEROZ Miller

## 2024-06-11 NOTE — PROGRESS NOTES
Previously got diffuse hives on AIT (outside hospital)  Was tolerated AIT with us well  Today received 0.45mL red of her 3 vials  Immediately became dizzy, hot, sweaty, lightheaded, nausea, felt like everything was slow motion, felt like she needed to use the bathroom, wanted to lay down  No hives, no flushing, lungs clear  Brought to a patient room and laid on bed, legs lifted and given epi 0.5mL to left thigh, ice chips, rapid improvement in all symptoms  Monitored for 75 minutes with continued improvement, discharged back at baseline  Tryptase ordered to be drawn within 2 hours of reaction, as while this is consistent with anaphylaxis, she had rapidly presenting symptoms which are typical of more severe reactions. If elevated would repeat in a week.   Upon discharge however she preferred to skip labs for today, and will draw a baseline tryptase in a week.

## 2024-06-17 ENCOUNTER — CLINICAL SUPPORT (OUTPATIENT)
Dept: ALLERGY | Facility: CLINIC | Age: 54
End: 2024-06-17
Payer: COMMERCIAL

## 2024-06-17 DIAGNOSIS — J30.9 CHRONIC ALLERGIC RHINITIS: Primary | ICD-10-CM

## 2024-06-17 PROCEDURE — 95117 IMMUNOTHERAPY INJECTIONS: CPT | Mod: S$GLB,,, | Performed by: STUDENT IN AN ORGANIZED HEALTH CARE EDUCATION/TRAINING PROGRAM

## 2024-06-17 NOTE — PROGRESS NOTES
Due to allergic reaction with last injection dose was dropped back to 0.4 ml as per     Injections were given by     SQ-LT UPPER ARM  ALLERGY injection Red vial #1/3  DM/C/D/ 0.4 ml  given,tolerated well. Pt waited 30 minutes. No local reaction noted  SQ-LT UPPER ARM  ALLERGY injection Red  vial # 2/3 GR/T/TR/ 0.4 ml given,tolerated well. Pt waited 30 minutes. No local reaction noted  SQ-RT UPPER ARM  allergy injection Red vial # 3/3 W/TR/ 0.4 ml given,tolerated well. Pt. Waited 30 minutes, no  local reaction noted    Pt. Stated she took her antihistamines last night as usual.

## 2024-06-19 ENCOUNTER — CLINICAL SUPPORT (OUTPATIENT)
Dept: OBSTETRICS AND GYNECOLOGY | Facility: CLINIC | Age: 54
End: 2024-06-19
Payer: COMMERCIAL

## 2024-06-19 DIAGNOSIS — N95.1 SYMPTOMS, SUCH AS FLUSHING, SLEEPLESSNESS, HEADACHE, LACK OF CONCENTRATION, ASSOCIATED WITH THE MENOPAUSE: Primary | ICD-10-CM

## 2024-06-19 NOTE — PROGRESS NOTES
Ms. Trotter , is a 53-year-old black  female, who is interested in help  managing her menopause symptoms. She has an appointment with Bridgett Foster for a hormone consultation on June 26, at 9 am.  is breast cancer survivor.         Menstruation: Hysterectomy  2015. Does not know if the ovaries were removed.   Sleep:  She does wake multiple times through out the night, with some difficulty falling back to sleep .  Vagina: She denies discharge, odor, dryness or pain with intercourse.   Urinary Symptoms:  She does report some frequency and incontinence.She denies urgency.  Body: Denies in changes in weight.  Musculoskeletal: She report some  joint stiffness in her left shoulder, and left hip.Does notice that she has some joint stiffness after sitting long for periods of time. She denies fractures and back pain.  Gastrointestinal: She does report some soft stools . She denies constipation. She has has not had a colonoscopy   Mood: Irritability.  Breast: Breast cancer in 2006- Left breast mastectomy on 2006. R breast mastectomy in 2015. She tested positive for BRCA 2 gene. (Reconstruction & Chemo)  Libido: Libido has decreased,over the past 20 years. Pt reports still has the desire. Denies difficulty with orgasms.   Skin: She has noticed skin is dry and itchy, feels more like eczema. She does report some hair thinning/shedding recently.       Overall quality of life: 7/7.    The assessment was concluded at approximately 2:20pm.

## 2024-06-25 ENCOUNTER — CLINICAL SUPPORT (OUTPATIENT)
Dept: ALLERGY | Facility: CLINIC | Age: 54
End: 2024-06-25
Payer: COMMERCIAL

## 2024-06-25 DIAGNOSIS — J30.9 CHRONIC ALLERGIC RHINITIS: Primary | ICD-10-CM

## 2024-06-25 PROCEDURE — 95117 IMMUNOTHERAPY INJECTIONS: CPT | Mod: S$GLB,,, | Performed by: STUDENT IN AN ORGANIZED HEALTH CARE EDUCATION/TRAINING PROGRAM

## 2024-06-25 PROCEDURE — 99999 PR PBB SHADOW E&M-EST. PATIENT-LVL I: CPT | Mod: PBBFAC,,,

## 2024-06-26 ENCOUNTER — OFFICE VISIT (OUTPATIENT)
Dept: OBSTETRICS AND GYNECOLOGY | Facility: CLINIC | Age: 54
End: 2024-06-26
Payer: COMMERCIAL

## 2024-06-26 VITALS
BODY MASS INDEX: 19.91 KG/M2 | DIASTOLIC BLOOD PRESSURE: 63 MMHG | WEIGHT: 108.19 LBS | SYSTOLIC BLOOD PRESSURE: 94 MMHG | HEIGHT: 62 IN

## 2024-06-26 DIAGNOSIS — Z85.3 PERSONAL HISTORY OF BREAST CANCER: Primary | ICD-10-CM

## 2024-06-26 DIAGNOSIS — N95.1 MENOPAUSAL SYMPTOMS: ICD-10-CM

## 2024-06-26 DIAGNOSIS — Z15.01 BRCA2 GENE MUTATION POSITIVE: ICD-10-CM

## 2024-06-26 DIAGNOSIS — Z15.09 BRCA2 GENE MUTATION POSITIVE: ICD-10-CM

## 2024-06-26 PROCEDURE — 3078F DIAST BP <80 MM HG: CPT | Mod: CPTII,S$GLB,, | Performed by: PHYSICIAN ASSISTANT

## 2024-06-26 PROCEDURE — 3008F BODY MASS INDEX DOCD: CPT | Mod: CPTII,S$GLB,, | Performed by: PHYSICIAN ASSISTANT

## 2024-06-26 PROCEDURE — 99214 OFFICE O/P EST MOD 30 MIN: CPT | Mod: S$GLB,,, | Performed by: PHYSICIAN ASSISTANT

## 2024-06-26 PROCEDURE — 99999 PR PBB SHADOW E&M-EST. PATIENT-LVL III: CPT | Mod: PBBFAC,,, | Performed by: PHYSICIAN ASSISTANT

## 2024-06-26 PROCEDURE — 1159F MED LIST DOCD IN RCRD: CPT | Mod: CPTII,S$GLB,, | Performed by: PHYSICIAN ASSISTANT

## 2024-06-26 PROCEDURE — 3074F SYST BP LT 130 MM HG: CPT | Mod: CPTII,S$GLB,, | Performed by: PHYSICIAN ASSISTANT

## 2024-06-26 PROCEDURE — 1160F RVW MEDS BY RX/DR IN RCRD: CPT | Mod: CPTII,S$GLB,, | Performed by: PHYSICIAN ASSISTANT

## 2024-06-26 PROCEDURE — 3044F HG A1C LEVEL LT 7.0%: CPT | Mod: CPTII,S$GLB,, | Performed by: PHYSICIAN ASSISTANT

## 2024-06-26 RX ORDER — FEZOLINETANT 45 MG/1
45 TABLET, FILM COATED ORAL DAILY
Qty: 30 TABLET | Refills: 3 | Status: SHIPPED | OUTPATIENT
Start: 2024-06-26

## 2024-06-26 NOTE — PROGRESS NOTES
Subjective:      Shae Trotter is a 53 y.o. female who presents for HRT consult. Menarche at age 11. She is . Diagnosed with triple negative left breast cancer in  s/p left breast mastectomy in 2016 and tx with adjuvant chemotherapy. Menopause with chemo but then cycles returned. Positive for BRCA2 mutation. She is s/pTLH/BSO in  with Dr. Aguilar and prophylactic right breast mastectomy in . Surgical menopause at age 44.   She reports hot flashes mostly at night but also during the day, insomnia with struggling to fall asleep and waking through the night, some joint pain, irritability, brain fog and low libido. She has not tried anything to help with menopausal symptoms because unsure what would be safe. She did taking low dose lexapro for a short period of time during treatment. Denies vaginal dryness or dyspareunia. Her biggest concerns are insomnia and night sweats.     2024 LABS  AST 19  ALT 17    PCP: Dalila Phoenix MD    Routine labs: 2024  WWE: 2023 with Dr. Tapia  Pap smear: s/p hyst  Mammogram: s/p bilateral mastectomy  DEXA:   Colonoscopy: 2023 iFOBT    Lab Visit on 2024   Component Date Value Ref Range Status    Estradiol 2024 <10 (A)  See Text pg/mL Final    Follicle Stimulating Hormone 2024 97.41  See Text mIU/mL Final    LH 2024 27.2  See Text mIU/mL Final    Testosterone, Free 2024 0.5  pg/mL Final    Progesterone 2024 0.2  See Text ng/mL Final   Lab Visit on 2024   Component Date Value Ref Range Status    Cholesterol 2024 272 (H)  120 - 199 mg/dL Final    Triglycerides 2024 69  30 - 150 mg/dL Final    HDL 2024 85 (H)  40 - 75 mg/dL Final    LDL Cholesterol 2024 173.2 (H)  63.0 - 159.0 mg/dL Final    HDL/Cholesterol Ratio 2024 31.3  20.0 - 50.0 % Final    Total Cholesterol/HDL Ratio 2024 3.2  2.0 - 5.0 Final    Non-HDL Cholesterol 2024 187  mg/dL Final    WBC 2024 5.75   3.90 - 12.70 K/uL Final    RBC 05/16/2024 4.88  4.00 - 5.40 M/uL Final    Hemoglobin 05/16/2024 12.9  12.0 - 16.0 g/dL Final    Hematocrit 05/16/2024 42.1  37.0 - 48.5 % Final    MCV 05/16/2024 86  82 - 98 fL Final    MCH 05/16/2024 26.4 (L)  27.0 - 31.0 pg Final    MCHC 05/16/2024 30.6 (L)  32.0 - 36.0 g/dL Final    RDW 05/16/2024 14.3  11.5 - 14.5 % Final    Platelets 05/16/2024 356  150 - 450 K/uL Final    MPV 05/16/2024 9.8  9.2 - 12.9 fL Final    Immature Granulocytes 05/16/2024 0.2  0.0 - 0.5 % Final    Gran # (ANC) 05/16/2024 2.6  1.8 - 7.7 K/uL Final    Immature Grans (Abs) 05/16/2024 0.01  0.00 - 0.04 K/uL Final    Lymph # 05/16/2024 2.8  1.0 - 4.8 K/uL Final    Mono # 05/16/2024 0.3  0.3 - 1.0 K/uL Final    Eos # 05/16/2024 0.0  0.0 - 0.5 K/uL Final    Baso # 05/16/2024 0.00  0.00 - 0.20 K/uL Final    nRBC 05/16/2024 0  0 /100 WBC Final    Gran % 05/16/2024 45.9  38.0 - 73.0 % Final    Lymph % 05/16/2024 48.0  18.0 - 48.0 % Final    Mono % 05/16/2024 5.9  4.0 - 15.0 % Final    Eosinophil % 05/16/2024 0.0  0.0 - 8.0 % Final    Basophil % 05/16/2024 0.0  0.0 - 1.9 % Final    Differential Method 05/16/2024 Automated   Final    TSH 05/16/2024 1.760  0.400 - 4.000 uIU/mL Final    Sodium 05/16/2024 140  136 - 145 mmol/L Final    Potassium 05/16/2024 4.0  3.5 - 5.1 mmol/L Final    Chloride 05/16/2024 104  95 - 110 mmol/L Final    CO2 05/16/2024 27  23 - 29 mmol/L Final    Glucose 05/16/2024 100  70 - 110 mg/dL Final    BUN 05/16/2024 15  6 - 20 mg/dL Final    Creatinine 05/16/2024 0.8  0.5 - 1.4 mg/dL Final    Calcium 05/16/2024 9.6  8.7 - 10.5 mg/dL Final    Total Protein 05/16/2024 7.7  6.0 - 8.4 g/dL Final    Albumin 05/16/2024 4.1  3.5 - 5.2 g/dL Final    Total Bilirubin 05/16/2024 0.4  0.1 - 1.0 mg/dL Final    Alkaline Phosphatase 05/16/2024 79  55 - 135 U/L Final    AST 05/16/2024 19  10 - 40 U/L Final    ALT 05/16/2024 17  10 - 44 U/L Final    eGFR 05/16/2024 >60  >60 mL/min/1.73 m^2 Final    Anion Gap  2024 9  8 - 16 mmol/L Final    Hemoglobin A1C 2024 5.3  4.0 - 5.6 % Final    Estimated Avg Glucose 2024 105  68 - 131 mg/dL Final       Past Medical History:   Diagnosis Date    Allergy     taking allx shots since 2006    Breast cancer 2006    breast cancer - tx with mastectomy, chemo - followed by Sunny Basurto    Genetic testing 2014    BRCA2 deleterious mutation    Other screening mammogram 2015    Seasonal allergies      Past Surgical History:   Procedure Laterality Date    COSMETIC SURGERY      right mastectomy after genetic testing returned    D&C hysterscope.      HYSTERECTOMY  4/13/15    Robotic LH/BSO     Left mastectomy and chemo Left 2006    TRAM flap reconstruction     MASTECTOMY Left 2006    [facility] REMOVAL OF OVARY/TUBE(S)      Right breast cysts removed      tram flap  Left 2006    breast reconstruction.      Social History     Tobacco Use    Smoking status: Never     Passive exposure: Never    Smokeless tobacco: Never   Substance Use Topics    Alcohol use: No     Alcohol/week: 0.0 standard drinks of alcohol    Drug use: No     Family History   Problem Relation Name Age of Onset    Hyperlipidemia Mother      Arrhythmia Mother          on Digoxin    Cataracts Mother      Diabetes Father      Hyperlipidemia Father      Heart disease Father  64         22 to MI    No Known Problems Brother      No Known Problems Daughter      Allergic rhinitis Son      No Known Problems Brother      Ovarian cancer Neg Hx      Uterine cancer Neg Hx      Breast cancer Neg Hx      Colon cancer Neg Hx      Amblyopia Neg Hx      Blindness Neg Hx      Cancer Neg Hx      Glaucoma Neg Hx      Hypertension Neg Hx      Retinal detachment Neg Hx      Strabismus Neg Hx      Macular degeneration Neg Hx      Stroke Neg Hx      Thyroid disease Neg Hx      Melanoma Neg Hx       OB History    Para Term  AB Living   2 2 2     2   SAB IAB Ectopic Multiple Live Births                  # Outcome Date  GA Lbr Rodriguez/2nd Weight Sex Type Anes PTL Lv   2 Term 10/10/02 40w0d   F Vag-Spont EPI     1 Term 01/15/91 40w0d  2.722 kg (6 lb) M Vag-Spont EPI         Current Outpatient Medications:     cetirizine (ZYRTEC) 10 MG tablet, Take 2 tablets (20 mg total) by mouth 2 (two) times daily. Start taking 5/15/2022 and continue through 5/17/2022. Take prior to Allergy appointment for 3 days, Disp: 12 tablet, Rfl: 0    EPINEPHrine (EPIPEN) 0.3 mg/0.3 mL AtIn, Inject 0.3 mL (0.3 mg total) into the muscle once for 1 dose, Disp: 2 each, Rfl: 2    fezolinetant (VEOZAH) 45 mg Tab, Take 1 tablet by mouth once daily., Disp: 30 tablet, Rfl: 3    fluocinonide-emollient (FLUOCINONIDE-E) 0.05 % Crea, Apply topically 2 (two) times daily on body as needed for eczema. (Patient not taking: Reported on 6/26/2024), Disp: 60 g, Rfl: 1    levocetirizine (XYZAL) 5 MG tablet, Take 1 tablet (5 mg total) by mouth every evening., Disp: 90 tablet, Rfl: 3    tretinoin (RETIN-A) 0.025 % cream, Compound tretinoin 0.025% / azelaic acid 8% / niacinamide 2% cream. Apply a pea-sized amount to entire face qhs. (Patient not taking: Reported on 6/26/2024), Disp: 30 g, Rfl: 5    triamcinolone acetonide 0.1% (KENALOG) 0.1 % cream, Apply topically 2 (two) times daily. (Patient not taking: Reported on 6/26/2024), Disp: 45 g, Rfl: 0    Current Facility-Administered Medications:     Allergy Mix, , Subcutaneous, 1 time in Clinic/HOD, Sunny Mujica MD    Allergy Mix, , Subcutaneous, 1 time in Clinic/HOD, Sunny Mujica MD    EPINEPHrine (PF) injection 0.5 mg, 0.5 mg, Subcutaneous, 1 time in Clinic/HOD,     The 10-year ASCVD risk score (Jagdish GREEN, et al., 2019) is: 0.8%    Values used to calculate the score:      Age: 53 years      Sex: Female      Is Non- : Yes      Diabetic: No      Tobacco smoker: No      Systolic Blood Pressure: 94 mmHg      Is BP treated: No      HDL Cholesterol: 85 mg/dL      Total Cholesterol: 272 mg/dL    Review of  "Systems:  General: No fever, chills, or weight loss.  Chest: No chest pain, shortness of breath, or palpitations.  Breast: No pain, masses, or nipple discharge.  Vulva: No pain, lesions, or itching.  Vagina: No relaxation, itching, discharge, or lesions.  Abdomen: No pain, nausea, vomiting, diarrhea, or constipation.  Urinary: No incontinence, nocturia, frequency, or dysuria.  Extremities:  No leg cramps, edema, or calf pain.  Neurologic: No headaches, dizziness, or visual changes.    Objective:     Vitals:    06/26/24 0857   BP: 94/63   Weight: 49.1 kg (108 lb 3.2 oz)   Height: 5' 2" (1.575 m)   PainSc: 0-No pain     Body mass index is 19.79 kg/m².      Physical Exam: Deferred      Assessment:    Personal history of breast cancer    Menopausal symptoms  -     fezolinetant (VEOZAH) 45 mg Tab; Take 1 tablet by mouth once daily.  Dispense: 30 tablet; Refill: 3    BRCA2 gene mutation positive        Plan:   Reviewed all options for menopausal symptoms with history of breast cancer, including antidepressants like effexor or trazodone, veozah, testosterone therapy, etc  Benefits and risks of each were reviewed  Start Veozah 45mg QD  Monitor LFTs [id number] for the first year  Add mg glycinate 400-500mg QHS  Add Lion's Clay  Discuss dexa at follow up  Follow up in 3 months.    Instructed patient to call if she experiences any side effects or has any questions.    I spent a total of 30 minutes on the day of the visit.This includes face to face time and non-face to face time preparing to see the patient (eg, review of tests), obtaining and/or reviewing separately obtained history, documenting clinical information in the electronic or other health record, independently interpreting results and communicating results to the patient/family/caregiver, or care coordinator.    "

## 2024-07-23 ENCOUNTER — CLINICAL SUPPORT (OUTPATIENT)
Dept: ALLERGY | Facility: CLINIC | Age: 54
End: 2024-07-23
Payer: COMMERCIAL

## 2024-07-23 DIAGNOSIS — J30.9 CHRONIC ALLERGIC RHINITIS: Primary | ICD-10-CM

## 2024-07-23 PROCEDURE — 99999 PR PBB SHADOW E&M-EST. PATIENT-LVL I: CPT | Mod: PBBFAC,,,

## 2024-07-23 PROCEDURE — 95117 IMMUNOTHERAPY INJECTIONS: CPT | Mod: S$GLB,,, | Performed by: STUDENT IN AN ORGANIZED HEALTH CARE EDUCATION/TRAINING PROGRAM

## 2024-08-11 ENCOUNTER — PATIENT MESSAGE (OUTPATIENT)
Dept: DERMATOLOGY | Facility: CLINIC | Age: 54
End: 2024-08-11
Payer: COMMERCIAL

## 2024-08-13 ENCOUNTER — OFFICE VISIT (OUTPATIENT)
Dept: DERMATOLOGY | Facility: CLINIC | Age: 54
End: 2024-08-13
Payer: COMMERCIAL

## 2024-08-13 DIAGNOSIS — B35.4 TINEA CORPORIS: Primary | ICD-10-CM

## 2024-08-13 PROCEDURE — 1159F MED LIST DOCD IN RCRD: CPT | Mod: CPTII,S$GLB,, | Performed by: DERMATOLOGY

## 2024-08-13 PROCEDURE — 1160F RVW MEDS BY RX/DR IN RCRD: CPT | Mod: CPTII,S$GLB,, | Performed by: DERMATOLOGY

## 2024-08-13 PROCEDURE — 99213 OFFICE O/P EST LOW 20 MIN: CPT | Mod: S$GLB,,, | Performed by: DERMATOLOGY

## 2024-08-13 PROCEDURE — 3044F HG A1C LEVEL LT 7.0%: CPT | Mod: CPTII,S$GLB,, | Performed by: DERMATOLOGY

## 2024-08-13 RX ORDER — TERBINAFINE HYDROCHLORIDE 250 MG/1
250 TABLET ORAL DAILY
Qty: 14 TABLET | Refills: 0 | Status: SHIPPED | OUTPATIENT
Start: 2024-08-13 | End: 2024-08-27

## 2024-08-13 NOTE — PROGRESS NOTES
Patient Information  Name: Shae Trotter  : 1970  MRN: [id number]     Referring Physician:  No ref. provider found   Primary Care Physician:  Dalila Phoenix MD   Date of Visit: 2024      Subjective:     History of Present lllness:    Shae Trotter is a 53 y.o. female who presents with a chief complaint of rash.  Location: left posterior arm  Duration: 10 days  Signs/Symptoms: red, raised, not itchy or painful, stable since it appeared  Exacerbating factors: none  Relieving factors/Prior treatments: OTC hydrocortisone, antifungals, Ice     Patient was last seen: 2024.  Prior notes by myself reviewed.   Clinical documentation obtained by nursing staff reviewed.    Review of Systems    Objective:   Physical Exam   Constitutional: She appears well-developed and well-nourished. No distress.   Neurological: She is alert and oriented to person, place, and time. She is not disoriented.   Psychiatric: She has a normal mood and affect.   Skin:   Areas Examined (abnormalities noted in diagram):   Head / Face Inspection Performed  Neck Inspection Performed  Back Inspection Performed  RUE Inspected  LUE Inspection Performed  RLE Inspected  LLE Inspection Performed            Diagram Legend     Erythematous scaling macule/papule c/w actinic keratosis       Vascular papule c/w angioma      Pigmented verrucoid papule/plaque c/w seborrheic keratosis      Yellow umbilicated papule c/w sebaceous hyperplasia      Irregularly shaped tan macule c/w lentigo     1-2 mm smooth white papules consistent with Milia      Movable subcutaneous cyst with punctum c/w epidermal inclusion cyst      Subcutaneous movable cyst c/w pilar cyst      Firm pink to brown papule c/w dermatofibroma      Pedunculated fleshy papule(s) c/w skin tag(s)      Evenly pigmented macule c/w junctional nevus     Mildly variegated pigmented, slightly irregular-bordered macule c/w mildly atypical nevus      Flesh colored to evenly pigmented  papule c/w intradermal nevus       Pink pearly papule/plaque c/w basal cell carcinoma      Erythematous hyperkeratotic cursted plaque c/w SCC      Surgical scar with no sign of skin cancer recurrence      Open and closed comedones      Inflammatory papules and pustules      Verrucoid papule consistent consistent with wart     Erythematous eczematous patches and plaques     Dystrophic onycholytic nail with subungual debris c/w onychomycosis     Umbilicated papule    Erythematous-base heme-crusted tan verrucoid plaque consistent with inflamed seborrheic keratosis     Erythematous Silvery Scaling Plaque c/w Psoriasis     See annotation    No images are attached to the encounter or orders placed in the encounter.      [] Data reviewed  [] Prior external notes reviewed  [] Independent review of test  [] Management discussed with another provider  [] Independent historian    Assessment / Plan:        Tinea corporis  -     terbinafine HCL (LAMISIL) 250 mg tablet; Take 1 tablet (250 mg total) by mouth once daily. for 14 days  Dispense: 14 tablet; Refill: 0  Discussed treatment options with the patient, including risks, benefits, and side effects of oral Lamisil  vs. topical treatments. Common side effects may include headache, skin rash, GI upset, and taste disturbances. Rare side effects may include severe skin rash or hepatitis. Patient elected to proceed with oral Lamisil treatment.      Recommend OTC Lamisil AT (terbinafine) cream to the affected areas 2 times per day for 1 month.        Follow up if symptoms worsen or fail to improve.      Margarita Barone MD, FAAD  [facility]

## 2024-08-25 DIAGNOSIS — L81.1 MELASMA: ICD-10-CM

## 2024-08-26 ENCOUNTER — PATIENT MESSAGE (OUTPATIENT)
Dept: DERMATOLOGY | Facility: CLINIC | Age: 54
End: 2024-08-26
Payer: COMMERCIAL

## 2024-08-27 RX ORDER — TRETINOIN 0.25 MG/G
CREAM TOPICAL
Qty: 30 G | Refills: 5 | Status: SHIPPED | OUTPATIENT
Start: 2024-08-27

## 2024-08-28 ENCOUNTER — CLINICAL SUPPORT (OUTPATIENT)
Dept: ALLERGY | Facility: CLINIC | Age: 54
End: 2024-08-28
Payer: COMMERCIAL

## 2024-08-28 DIAGNOSIS — J30.9 CHRONIC ALLERGIC RHINITIS: Primary | ICD-10-CM

## 2024-08-28 PROCEDURE — 99999 PR PBB SHADOW E&M-EST. PATIENT-LVL I: CPT | Mod: PBBFAC,,,

## 2024-08-28 PROCEDURE — 95117 IMMUNOTHERAPY INJECTIONS: CPT | Mod: S$GLB,,, | Performed by: ALLERGY & IMMUNOLOGY

## 2024-09-24 ENCOUNTER — CLINICAL SUPPORT (OUTPATIENT)
Dept: ALLERGY | Facility: CLINIC | Age: 54
End: 2024-09-24
Payer: COMMERCIAL

## 2024-09-24 DIAGNOSIS — J30.9 CHRONIC ALLERGIC RHINITIS: Primary | ICD-10-CM

## 2024-09-24 PROCEDURE — 99999 PR PBB SHADOW E&M-EST. PATIENT-LVL I: CPT | Mod: PBBFAC,,,

## 2024-09-24 PROCEDURE — 95117 IMMUNOTHERAPY INJECTIONS: CPT | Mod: S$GLB,,, | Performed by: STUDENT IN AN ORGANIZED HEALTH CARE EDUCATION/TRAINING PROGRAM

## 2024-09-27 ENCOUNTER — OFFICE VISIT (OUTPATIENT)
Dept: OBSTETRICS AND GYNECOLOGY | Facility: CLINIC | Age: 54
End: 2024-09-27
Payer: COMMERCIAL

## 2024-09-27 VITALS
SYSTOLIC BLOOD PRESSURE: 99 MMHG | HEART RATE: 82 BPM | HEIGHT: 62 IN | DIASTOLIC BLOOD PRESSURE: 68 MMHG | BODY MASS INDEX: 19.81 KG/M2 | WEIGHT: 107.63 LBS

## 2024-09-27 DIAGNOSIS — Z13.820 SCREENING FOR OSTEOPOROSIS: ICD-10-CM

## 2024-09-27 DIAGNOSIS — N95.1 MENOPAUSAL SYMPTOMS: Primary | ICD-10-CM

## 2024-09-27 DIAGNOSIS — Z15.09 BRCA2 GENE MUTATION POSITIVE: ICD-10-CM

## 2024-09-27 DIAGNOSIS — Z85.3 PERSONAL HISTORY OF BREAST CANCER: ICD-10-CM

## 2024-09-27 DIAGNOSIS — Z15.01 BRCA2 GENE MUTATION POSITIVE: ICD-10-CM

## 2024-09-27 PROCEDURE — 99999 PR PBB SHADOW E&M-EST. PATIENT-LVL IV: CPT | Mod: PBBFAC,,, | Performed by: PHYSICIAN ASSISTANT

## 2024-09-27 RX ORDER — FEZOLINETANT 45 MG/1
45 TABLET, FILM COATED ORAL DAILY
Qty: 30 TABLET | Refills: 3 | Status: SHIPPED | OUTPATIENT
Start: 2024-09-27

## 2024-09-27 NOTE — PROGRESS NOTES
Subjective:      Shae Trotter is a 53 y.o. female who presents for follow up.Taking veozah 45mg QD. Has reduced hot flashes and sleep improved. In the last week has been waking in the night. Did just get a new puppy. Occasional headache but tolerable. Brain fog has improved with better sleep and less hot flashes. Never started lion's giacomo. She is taking otc mg glycinate.  Has noticed increase in total cholesterol in the last 8-10 years since menopause. Diet has not changed. No formal exercise. Her parents did have hyperlipidemia. Recent CT calcium score of 4, so did not start statin.    Oncology Hx: Triple negative left breast cancer in 2006 s/p left breast mastectomy in 5/2016 and tx with adjuvant chemotherapy. Menopause with chemo but then cycles returned. Positive for BRCA2 mutation. She is s/pTLH/BSO in 2015 with Dr. Aguilar and prophylactic right breast mastectomy in 2016. Surgical menopause at age 44.     5/16/2024 LABS  AST 19  ALT 17     PCP: Dalila Phoenix MD    Routine labs: 5/16/2024  WWE: 9/14/2023 with Dr. Tapia  Pap smear: s/p hyst  Mammogram: s/p bilateral mastectomy  DEXA: Unsure  Colonoscopy: 6/20/2023 iFOBT    No visits with results within 3 Month(s) from this visit.   Latest known visit with results is:   Lab Visit on 06/07/2024   Component Date Value Ref Range Status    Estradiol 06/07/2024 <10 (A)  See Text pg/mL Final    Follicle Stimulating Hormone 06/07/2024 97.41  See Text mIU/mL Final    LH 06/07/2024 27.2  See Text mIU/mL Final    Testosterone, Free 06/07/2024 0.5  pg/mL Final    Progesterone 06/07/2024 0.2  See Text ng/mL Final       Past Medical History:   Diagnosis Date    Allergy     taking allx shots since 2006    Breast cancer 2006    breast cancer - tx with mastectomy, chemo - followed by Sunny Basurto    Genetic testing 11/2014    BRCA2 deleterious mutation    Other screening mammogram 6/24/2015    Seasonal allergies      Past Surgical History:   Procedure Laterality Date     COSMETIC SURGERY      right mastectomy after genetic testing returned    D&C hysterscope.      HYSTERECTOMY  4/13/15    Robotic LH/BSO     Left mastectomy and chemo Left 2006    TRAM flap reconstruction     MASTECTOMY Left 2006    [place] REMOVAL OF OVARY/TUBE(S)      Right breast cysts removed      tram flap  Left 2006    breast reconstruction.      Social History     Tobacco Use    Smoking status: Never     Passive exposure: Never    Smokeless tobacco: Never   Substance Use Topics    Alcohol use: No     Alcohol/week: 0.0 standard drinks of alcohol    Drug use: No     Family History   Problem Relation Name Age of Onset    Hyperlipidemia Mother      Arrhythmia Mother          on Digoxin    Cataracts Mother      Diabetes Father      Hyperlipidemia Father      Heart disease Father  64         2/2 to MI    No Known Problems Brother      No Known Problems Daughter      Allergic rhinitis Son      No Known Problems Brother      Ovarian cancer Neg Hx      Uterine cancer Neg Hx      Breast cancer Neg Hx      Colon cancer Neg Hx      Amblyopia Neg Hx      Blindness Neg Hx      Cancer Neg Hx      Glaucoma Neg Hx      Hypertension Neg Hx      Retinal detachment Neg Hx      Strabismus Neg Hx      Macular degeneration Neg Hx      Stroke Neg Hx      Thyroid disease Neg Hx      Melanoma Neg Hx       OB History    Para Term  AB Living   2 2 2     2   SAB IAB Ectopic Multiple Live Births                  # Outcome Date GA Lbr Rodriguez/2nd Weight Sex Type Anes PTL Lv   2 Term 10/10/02 40w0d   F Vag-Spont EPI     1 Term 01/15/91 40w0d  2.722 kg (6 lb) M Vag-Spont EPI         Current Outpatient Medications:     cetirizine (ZYRTEC) 10 MG tablet, Take 2 tablets (20 mg total) by mouth 2 (two) times daily. Start taking 5/15/2022 and continue through 2022. Take prior to Allergy appointment for 3 days, Disp: 12 tablet, Rfl: 0    EPINEPHrine (EPIPEN) 0.3 mg/0.3 mL AtIn, Inject 0.3 mL (0.3 mg total) into the muscle once for 1  "dose, Disp: 2 each, Rfl: 2    fluocinonide-emollient (FLUOCINONIDE-E) 0.05 % Crea, Apply topically 2 (two) times daily on body as needed for eczema., Disp: 60 g, Rfl: 1    levocetirizine (XYZAL) 5 MG tablet, Take 1 tablet (5 mg total) by mouth every evening., Disp: 90 tablet, Rfl: 3    tretinoin (RETIN-A) 0.025 % cream, Compound tretinoin 0.025% / azelaic acid 8% / niacinamide 2% cream. Apply a pea-sized amount to entire face qhs., Disp: 30 g, Rfl: 5    triamcinolone acetonide 0.1% (KENALOG) 0.1 % cream, Apply topically 2 (two) times daily., Disp: 45 g, Rfl: 0    fezolinetant (VEOZAH) 45 mg Tab, Take 1 tablet by mouth once daily., Disp: 30 tablet, Rfl: 3    Current Facility-Administered Medications:     Allergy Mix, , Subcutaneous, 1 time in Clinic/HOD, Sunny Mujica MD    Allergy Mix, , Subcutaneous, 1 time in Clinic/HOD, Sunny Mujica MD    EPINEPHrine (PF) injection 0.5 mg, 0.5 mg, Subcutaneous, 1 time in Clinic/HOD,     Review of Systems:  General: No fever, chills, or weight loss.  Chest: No chest pain, shortness of breath, or palpitations.  Breast: No pain, masses, or nipple discharge.  Vulva: No pain, lesions, or itching.  Vagina: No relaxation, itching, discharge, or lesions.  Abdomen: No pain, nausea, vomiting, diarrhea, or constipation.  Urinary: No incontinence, nocturia, frequency, or dysuria.  Extremities:  No leg cramps, edema, or calf pain.  Neurologic: No headaches, dizziness, or visual changes.    Objective:     Vitals:    09/27/24 0803   BP: 99/68   Pulse: 82   Weight: 48.8 kg (107 lb 9.6 oz)   Height: 5' 2" (1.575 m)   PainSc: 0-No pain     Body mass index is 19.68 kg/m².    PHYSICAL EXAM:  APPEARANCE: Well nourished, well developed, in no acute distress.      Assessment:      Menopausal symptoms  -     Comprehensive Metabolic Panel; Future; Expected date: 09/27/2024  -     fezolinetant (VEOZAH) 45 mg Tab; Take 1 tablet by mouth once daily.  Dispense: 30 tablet; Refill: 3    BRCA2 gene " mutation positive    Personal history of breast cancer    Screening for osteoporosis  -     DXA Bone Density Axial Skeleton 1 or more sites; Future; Expected date: 09/27/2024        Plan:   Continue Veozah 45mg QD  CMP today  Monitor LFTs [id number] for the first year  Continue mg glycinate 400-500mg QHS  DEXA  Add red yeast rice  Consider reaching lipids at follow up.  Follow up in 3 months    Instructed patient to call if she experiences any side effects or has any questions.    I spent a total of 25 minutes on the day of the visit.This includes face to face time and non-face to face time preparing to see the patient (eg, review of tests), obtaining and/or reviewing separately obtained history, documenting clinical information in the electronic or other health record, independently interpreting results and communicating results to the patient/family/caregiver, or care coordinator.

## 2024-09-30 ENCOUNTER — LAB VISIT (OUTPATIENT)
Dept: LAB | Facility: OTHER | Age: 54
End: 2024-09-30
Attending: PHYSICIAN ASSISTANT
Payer: COMMERCIAL

## 2024-09-30 DIAGNOSIS — N95.1 MENOPAUSAL SYMPTOMS: ICD-10-CM

## 2024-09-30 LAB
ALBUMIN SERPL BCP-MCNC: 3.9 G/DL (ref 3.5–5.2)
ALP SERPL-CCNC: 78 U/L (ref 55–135)
ALT SERPL W/O P-5'-P-CCNC: 10 U/L (ref 10–44)
ANION GAP SERPL CALC-SCNC: 9 MMOL/L (ref 8–16)
AST SERPL-CCNC: 16 U/L (ref 10–40)
BILIRUB SERPL-MCNC: 0.4 MG/DL (ref 0.1–1)
BUN SERPL-MCNC: 13 MG/DL (ref 6–20)
CALCIUM SERPL-MCNC: 9.4 MG/DL (ref 8.7–10.5)
CHLORIDE SERPL-SCNC: 108 MMOL/L (ref 95–110)
CO2 SERPL-SCNC: 26 MMOL/L (ref 23–29)
CREAT SERPL-MCNC: 0.8 MG/DL (ref 0.5–1.4)
EST. GFR  (NO RACE VARIABLE): >60 ML/MIN/1.73 M^2
GLUCOSE SERPL-MCNC: 83 MG/DL (ref 70–110)
POTASSIUM SERPL-SCNC: 4.1 MMOL/L (ref 3.5–5.1)
PROT SERPL-MCNC: 7.2 G/DL (ref 6–8.4)
SODIUM SERPL-SCNC: 143 MMOL/L (ref 136–145)

## 2024-09-30 PROCEDURE — 36415 COLL VENOUS BLD VENIPUNCTURE: CPT | Performed by: PHYSICIAN ASSISTANT

## 2024-09-30 PROCEDURE — 80053 COMPREHEN METABOLIC PANEL: CPT | Performed by: PHYSICIAN ASSISTANT

## 2024-10-01 ENCOUNTER — PATIENT MESSAGE (OUTPATIENT)
Dept: OBSTETRICS AND GYNECOLOGY | Facility: CLINIC | Age: 54
End: 2024-10-01
Payer: COMMERCIAL

## 2024-10-01 ENCOUNTER — HOSPITAL ENCOUNTER (OUTPATIENT)
Dept: RADIOLOGY | Facility: OTHER | Age: 54
Discharge: HOME | End: 2024-10-01
Attending: PHYSICIAN ASSISTANT
Payer: COMMERCIAL

## 2024-10-01 DIAGNOSIS — Z13.820 SCREENING FOR OSTEOPOROSIS: ICD-10-CM

## 2024-10-01 PROCEDURE — 77080 DXA BONE DENSITY AXIAL: CPT | Mod: 26,,, | Performed by: RADIOLOGY

## 2024-10-01 PROCEDURE — 77080 DXA BONE DENSITY AXIAL: CPT | Mod: TC

## 2024-10-07 ENCOUNTER — PATIENT MESSAGE (OUTPATIENT)
Dept: OPTOMETRY | Facility: CLINIC | Age: 54
End: 2024-10-07
Payer: COMMERCIAL

## 2024-10-21 ENCOUNTER — OFFICE VISIT (OUTPATIENT)
Dept: OTOLARYNGOLOGY | Facility: CLINIC | Age: 54
End: 2024-10-21
Payer: COMMERCIAL

## 2024-10-21 ENCOUNTER — CLINICAL SUPPORT (OUTPATIENT)
Dept: OTOLARYNGOLOGY | Facility: CLINIC | Age: 54
End: 2024-10-21
Payer: COMMERCIAL

## 2024-10-21 VITALS
WEIGHT: 107.56 LBS | HEIGHT: 62 IN | BODY MASS INDEX: 19.79 KG/M2 | HEART RATE: 106 BPM | DIASTOLIC BLOOD PRESSURE: 71 MMHG | SYSTOLIC BLOOD PRESSURE: 103 MMHG

## 2024-10-21 DIAGNOSIS — Z01.10 NORMAL HEARING EXAM: Primary | ICD-10-CM

## 2024-10-21 DIAGNOSIS — H69.92 ETD (EUSTACHIAN TUBE DYSFUNCTION), LEFT: ICD-10-CM

## 2024-10-21 DIAGNOSIS — J30.89 NON-SEASONAL ALLERGIC RHINITIS, UNSPECIFIED TRIGGER: ICD-10-CM

## 2024-10-21 DIAGNOSIS — H93.292 ABNORMAL AUDITORY PERCEPTION OF LEFT EAR: Primary | ICD-10-CM

## 2024-10-21 DIAGNOSIS — H60.8X3 CHRONIC ECZEMATOUS OTITIS EXTERNA OF BOTH EARS: ICD-10-CM

## 2024-10-21 DIAGNOSIS — H91.90 HEARING LOSS, UNSPECIFIED HEARING LOSS TYPE, UNSPECIFIED LATERALITY: Primary | ICD-10-CM

## 2024-10-21 DIAGNOSIS — Z01.10 NORMAL HEARING EXAM: ICD-10-CM

## 2024-10-21 PROCEDURE — 3074F SYST BP LT 130 MM HG: CPT | Mod: CPTII,S$GLB,, | Performed by: STUDENT IN AN ORGANIZED HEALTH CARE EDUCATION/TRAINING PROGRAM

## 2024-10-21 PROCEDURE — 1160F RVW MEDS BY RX/DR IN RCRD: CPT | Mod: CPTII,S$GLB,, | Performed by: STUDENT IN AN ORGANIZED HEALTH CARE EDUCATION/TRAINING PROGRAM

## 2024-10-21 PROCEDURE — 3078F DIAST BP <80 MM HG: CPT | Mod: CPTII,S$GLB,, | Performed by: STUDENT IN AN ORGANIZED HEALTH CARE EDUCATION/TRAINING PROGRAM

## 2024-10-21 PROCEDURE — 92552 PURE TONE AUDIOMETRY AIR: CPT | Mod: S$GLB,,, | Performed by: AUDIOLOGIST

## 2024-10-21 PROCEDURE — 92567 TYMPANOMETRY: CPT | Mod: S$GLB,,, | Performed by: AUDIOLOGIST

## 2024-10-21 PROCEDURE — 92556 SPEECH AUDIOMETRY COMPLETE: CPT | Mod: S$GLB,,, | Performed by: AUDIOLOGIST

## 2024-10-21 PROCEDURE — 99204 OFFICE O/P NEW MOD 45 MIN: CPT | Mod: S$GLB,,, | Performed by: STUDENT IN AN ORGANIZED HEALTH CARE EDUCATION/TRAINING PROGRAM

## 2024-10-21 PROCEDURE — 1159F MED LIST DOCD IN RCRD: CPT | Mod: CPTII,S$GLB,, | Performed by: STUDENT IN AN ORGANIZED HEALTH CARE EDUCATION/TRAINING PROGRAM

## 2024-10-21 PROCEDURE — 3044F HG A1C LEVEL LT 7.0%: CPT | Mod: CPTII,S$GLB,, | Performed by: STUDENT IN AN ORGANIZED HEALTH CARE EDUCATION/TRAINING PROGRAM

## 2024-10-21 PROCEDURE — 3008F BODY MASS INDEX DOCD: CPT | Mod: CPTII,S$GLB,, | Performed by: STUDENT IN AN ORGANIZED HEALTH CARE EDUCATION/TRAINING PROGRAM

## 2024-10-21 RX ORDER — PREDNISONE 20 MG/1
20 TABLET ORAL DAILY
Qty: 5 TABLET | Refills: 0 | Status: SHIPPED | OUTPATIENT
Start: 2024-10-21 | End: 2024-10-27

## 2024-10-21 RX ORDER — FLUTICASONE PROPIONATE 50 MCG
2 SPRAY, SUSPENSION (ML) NASAL DAILY
Qty: 16 G | Refills: 2 | Status: SHIPPED | OUTPATIENT
Start: 2024-10-21 | End: 2025-01-19

## 2024-10-21 RX ORDER — CIPROFLOXACIN AND DEXAMETHASONE 3; 1 MG/ML; MG/ML
4 SUSPENSION/ DROPS AURICULAR (OTIC) 2 TIMES DAILY
Qty: 7.5 ML | Refills: 0 | Status: SHIPPED | OUTPATIENT
Start: 2024-10-21 | End: 2024-10-21 | Stop reason: CLARIF

## 2024-10-21 RX ORDER — FLUOCINOLONE ACETONIDE 0.11 MG/ML
5 OIL AURICULAR (OTIC) 2 TIMES DAILY PRN
Qty: 20 ML | Refills: 2 | Status: SHIPPED | OUTPATIENT
Start: 2024-10-21

## 2024-10-21 NOTE — PROGRESS NOTES
Ear, Nose, & Throat  Otolaryngology - Head & Neck Surgery      Subjective:     Chief Complaint:   Chief Complaint   Patient presents with    Ear Drainage       HPI:  Shae Trotter is a 53 y.o. female who was referred to me by Dr. Jordan in consultation for ear pain.    Presents today with a recent history of scant bloody otorrhea after Q-tip use.  She states that several weeks ago she had intense oral itching which she relieved with a Q-tip.  She noticed some scant blood on the end of the Q-tip.  About a week later she had repeated the same thing noticed more blood.  She denies any purulent otorrhea, otalgia, or tinnitus.  She states that her ear canals itch frequently.  She does have a history of childhood eczema.    She is seemingly wishes to discuss her left aural fullness.  She has a history of allergic rhinitis on maintenance immunotherapy.  She also takes Xyzal p.r.n..  She states that she does not tolerate nasal medications well.  She reports several times throughout the last year having left aural fullness, muffled hearing, and inability to pop her ears.  Does not been a regular issue for her in the past.  She states that her allergies have recently been problematic.  Along with this she has some concerns for hearing.  She states that she feels that she can not always discriminate words as well as she previously could.        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    Breast cancer screening, high risk patient 11/25/2014    Well woman exam with routine gynecological exam 02/10/2015    BRCA2 positive 04/02/2015    S/P laparoscopic hysterectomy 04/13/2015    Other  screening mammogram 06/24/2015    Abnormal mammogram with microcalcification 07/27/2015    Genetic susceptibility to malignant neoplasm of breast 12/16/2015    Hx of breast cancer 10/17/2016    Neck muscle spasm 11/05/2020    H/O bilateral mastectomy 03/22/2023    Mixed hyperlipidemia 03/23/2023    Decreased strength of trunk and back 05/03/2023     Resolved Ambulatory Problems     Diagnosis Date Noted    Chalazion of right lower eyelid 01/13/2015    Neck pain 03/20/2015    Right shoulder pain 03/20/2015     Past Medical History:   Diagnosis Date    Allergy     Breast cancer 2006    Genetic testing 11/2014    Seasonal allergies        Past Surgical History  She has a past surgical history that includes Right breast cysts removed; Left mastectomy and chemo (Left, 2006); D&C hysterscope.; Mastectomy (Left, 2006); tram flap  (Left, 2006); pr removal of ovary/tube(s); Hysterectomy (4/13/15); and Cosmetic surgery.    Past Surgical History:   Procedure Laterality Date    COSMETIC SURGERY      right mastectomy after genetic testing returned    D&C hysterscope.      HYSTERECTOMY  4/13/15    Robotic LH/BSO     Left mastectomy and chemo Left 2006    TRAM flap reconstruction     MASTECTOMY Left 2006    NH REMOVAL OF OVARY/TUBE(S)      Right breast cysts removed      tram flap  Left 2006    breast reconstruction.         Family History  Her family history includes Allergic rhinitis in her son; Arrhythmia in her mother; Cataracts in her mother; Diabetes in her father; Heart disease (age of onset: 64) in her father; Hyperlipidemia in her father and mother; No Known Problems in her brother, brother, and daughter.    Social History  She reports that she has never smoked. She has never been exposed to tobacco smoke. She has never used smokeless tobacco. She reports that she does not drink alcohol and does not use drugs.    Allergies  She is allergic to fish containing products, aspirin, fruit extracts, minocycline, penicillins, and  "walnut.    Medications  She has a current medication list which includes the following prescription(s): veozah, fluocinonide-emollient, tretinoin, triamcinolone acetonide 0.1%, cetirizine, epinephrine, fluocinolone acetonide oil, fluticasone propionate, levocetirizine, and prednisone, and the following Facility-Administered Medications: allergy mix, allergy mix, and epinephrine (pf).    ROS:  Pertinent positive and negative review of systems as noted in HPI.     Objective:     /71 (BP Location: Left arm, Patient Position: Sitting)   Pulse 106   Ht 5' 2" (1.575 m)   Wt 48.8 kg (107 lb 9.4 oz)   LMP 07/10/2012   BMI 19.68 kg/m²    Physical Exam  Constitutional: Well appearing, communicating. No acute distress  Voice: Euphonic  Eyes: Conjunctiva WNL, Pupils reactive  Head/Face: House Brackmann I Bilaterally.  Right Ear:    Auricle normally developed   EAC: normal, Dry, flaky skin   Tympanic membrane: intact   Middle Ear: No effusion present, Ossicles in normal position, and Auto-insufflates  Left Ear:    Auricle normally developed   EAC: Small 4mm scab in the inferior EAC near bony-cartilagenous junction. Dry, flaky skin   Tympanic membrane: intact   Middle Ear: No effusion present, Ossicles in normal position, and Unable to Autoinsufflate  Vestibular:    Spontaneous Nystagmus: none  Neuro/Psychiatric:     Affect: Appropriate   Eyes: EOMI intact  Respiratory: No increased WOB, no stridor     Data Review:   LABS  WBC (K/uL)   Date Value   05/16/2024 5.75     Platelets (K/uL)   Date Value   05/16/2024 356      Creatinine (mg/dL)   Date Value   09/30/2024 0.8     TSH (uIU/mL)   Date Value   05/16/2024 1.760     Glucose (mg/dL)   Date Value   09/30/2024 83     Hemoglobin A1C (%)   Date Value   05/16/2024 5.3         I have independently reviewed the lab results shown above. Findings significant for the conditions being treated include: unremarkable    IMAGING    No pertinent imaging available    AUDIO    I have " independently reviewed the following audiogram and reviewed the report. Findings as follows:     Pure Tone Audiometry: Symmetric  Right - Normal (0-25 dB) Hearing  Left - Normal (0-25 dB) Hearing    Tympanometry  Right: Type A  Left: Type A    Word Discrimination Score  Right: 100%  Left 100%    Acoustic Reflexes  Right Stimulation - Right reflex: DNT  Left Reflex: DNT  Left Stimulation - Right reflex: DNT  Left Reflex: DNT        Procedures:       Assessment:     1. Normal hearing exam    2. ETD (Eustachian tube dysfunction), left    3. Non-seasonal allergic rhinitis, unspecified trigger    4. Chronic eczematous otitis externa of both ears        Plan:     I had a long discussion with the patient regarding her condition and the further workup and management options. As I discussed with her, I suspect that she has ETD which is likely associated with her allergic rhinitis. I discussed with her restarting a trial of intranasal steroids along with her current regiment. She also has evidence of eczematous skin changes within the EACs bilaterally. Will give a rx for dermotic to use as needed for flares. She has a small traumatic area in the inferior left EAC which appears to be healing. Discussed avoiding any manipulation of the EACs.     Orders Placed This Encounter   Procedures    Ambulatory referral/consult to Audiology      Medications Ordered This Encounter   Medications    fluocinolone acetonide oiL (DERMOTIC OIL) 0.01 % Drop    fluticasone propionate (FLONASE) 50 mcg/actuation nasal spray    predniSONE (DELTASONE) 20 MG tablet      Problem List Items Addressed This Visit          ENT    AR (allergic rhinitis)     Other Visit Diagnoses       Normal hearing exam    -  Primary    Relevant Orders    Ambulatory referral/consult to Audiology    ETD (Eustachian tube dysfunction), left        Chronic eczematous otitis externa of both ears

## 2024-10-21 NOTE — PROGRESS NOTES
Shae Trotter, a 53 y.o. female, was seen today in the clinic for an audiologic evaluation.  The patient's main complaint was left ear aural fullness and difficulty hearing in noise.  Pt reported a feeling of fullness in the in the left ear that is not accompanied with pain.  She stated that she occasionally feels that she misses words when she is conversing in a noisy situation.  She denied otalgia, tinnitus and vertigo.      Tympanometry revealed Type A in the right ear and Type A in the left ear.  Audiogram results revealed normal hearing bilaterally.  Speech reception thresholds were noted at 0 dB in the right ear and 0 dB in the left ear.  Speech discrimination scores were 100% in the right ear and 100% in the left ear.    Recommendations:  Otologic evaluation  Annual audiogram  Hearing protection when in noise

## 2024-10-22 ENCOUNTER — CLINICAL SUPPORT (OUTPATIENT)
Dept: ALLERGY | Facility: CLINIC | Age: 54
End: 2024-10-22
Payer: COMMERCIAL

## 2024-10-22 DIAGNOSIS — J30.9 CHRONIC ALLERGIC RHINITIS: Primary | ICD-10-CM

## 2024-10-22 PROCEDURE — 99999 PR PBB SHADOW E&M-EST. PATIENT-LVL I: CPT | Mod: PBBFAC,,,

## 2024-10-22 PROCEDURE — 95117 IMMUNOTHERAPY INJECTIONS: CPT | Mod: S$GLB,,, | Performed by: STUDENT IN AN ORGANIZED HEALTH CARE EDUCATION/TRAINING PROGRAM

## 2024-11-19 ENCOUNTER — CLINICAL SUPPORT (OUTPATIENT)
Dept: ALLERGY | Facility: CLINIC | Age: 54
End: 2024-11-19
Payer: COMMERCIAL

## 2024-11-19 DIAGNOSIS — J30.9 CHRONIC ALLERGIC RHINITIS: Primary | ICD-10-CM

## 2024-11-19 PROCEDURE — 99999 PR PBB SHADOW E&M-EST. PATIENT-LVL I: CPT | Mod: PBBFAC,,,

## 2024-11-19 PROCEDURE — 95117 IMMUNOTHERAPY INJECTIONS: CPT | Mod: S$GLB,,, | Performed by: STUDENT IN AN ORGANIZED HEALTH CARE EDUCATION/TRAINING PROGRAM

## 2024-12-16 ENCOUNTER — CLINICAL SUPPORT (OUTPATIENT)
Dept: ALLERGY | Facility: CLINIC | Age: 54
End: 2024-12-16
Payer: COMMERCIAL

## 2024-12-16 DIAGNOSIS — J30.9 CHRONIC ALLERGIC RHINITIS: Primary | ICD-10-CM

## 2024-12-16 PROCEDURE — 99999 PR PBB SHADOW E&M-EST. PATIENT-LVL I: CPT | Mod: PBBFAC,,,

## 2024-12-16 PROCEDURE — 95117 IMMUNOTHERAPY INJECTIONS: CPT | Mod: S$GLB,,, | Performed by: ALLERGY & IMMUNOLOGY

## 2025-01-07 DIAGNOSIS — Z91.018 FOOD ALLERGY: ICD-10-CM

## 2025-01-08 RX ORDER — EPINEPHRINE 0.3 MG/.3ML
1 INJECTION SUBCUTANEOUS ONCE
Qty: 2 EACH | Refills: 0 | Status: SHIPPED | OUTPATIENT
Start: 2025-01-08 | End: 2025-01-10

## 2025-01-09 ENCOUNTER — PATIENT MESSAGE (OUTPATIENT)
Dept: OBSTETRICS AND GYNECOLOGY | Facility: CLINIC | Age: 55
End: 2025-01-09
Payer: COMMERCIAL

## 2025-01-10 ENCOUNTER — OFFICE VISIT (OUTPATIENT)
Dept: OBSTETRICS AND GYNECOLOGY | Facility: CLINIC | Age: 55
End: 2025-01-10
Payer: COMMERCIAL

## 2025-01-10 ENCOUNTER — LAB VISIT (OUTPATIENT)
Dept: LAB | Facility: OTHER | Age: 55
End: 2025-01-10
Attending: PHYSICIAN ASSISTANT
Payer: COMMERCIAL

## 2025-01-10 VITALS
HEART RATE: 69 BPM | BODY MASS INDEX: 18.95 KG/M2 | SYSTOLIC BLOOD PRESSURE: 104 MMHG | DIASTOLIC BLOOD PRESSURE: 72 MMHG | HEIGHT: 62 IN | WEIGHT: 103 LBS

## 2025-01-10 DIAGNOSIS — G47.00 INSOMNIA, UNSPECIFIED TYPE: ICD-10-CM

## 2025-01-10 DIAGNOSIS — N95.1 MENOPAUSAL SYMPTOMS: Primary | ICD-10-CM

## 2025-01-10 DIAGNOSIS — N95.1 MENOPAUSAL SYMPTOMS: ICD-10-CM

## 2025-01-10 DIAGNOSIS — Z15.09 BRCA2 GENE MUTATION POSITIVE: ICD-10-CM

## 2025-01-10 DIAGNOSIS — Z85.3 PERSONAL HISTORY OF BREAST CANCER: ICD-10-CM

## 2025-01-10 DIAGNOSIS — Z15.01 BRCA2 GENE MUTATION POSITIVE: ICD-10-CM

## 2025-01-10 LAB
ALBUMIN SERPL BCP-MCNC: 4 G/DL (ref 3.5–5.2)
ALP SERPL-CCNC: 79 U/L (ref 40–150)
ALT SERPL W/O P-5'-P-CCNC: 12 U/L (ref 10–44)
ANION GAP SERPL CALC-SCNC: 12 MMOL/L (ref 8–16)
AST SERPL-CCNC: 14 U/L (ref 10–40)
BILIRUB SERPL-MCNC: 0.4 MG/DL (ref 0.1–1)
BUN SERPL-MCNC: 16 MG/DL (ref 6–20)
CALCIUM SERPL-MCNC: 9.7 MG/DL (ref 8.7–10.5)
CHLORIDE SERPL-SCNC: 103 MMOL/L (ref 95–110)
CO2 SERPL-SCNC: 24 MMOL/L (ref 23–29)
CREAT SERPL-MCNC: 0.7 MG/DL (ref 0.5–1.4)
EST. GFR  (NO RACE VARIABLE): >60 ML/MIN/1.73 M^2
GLUCOSE SERPL-MCNC: 88 MG/DL (ref 70–110)
POTASSIUM SERPL-SCNC: 3.9 MMOL/L (ref 3.5–5.1)
PROT SERPL-MCNC: 7.5 G/DL (ref 6–8.4)
SODIUM SERPL-SCNC: 139 MMOL/L (ref 136–145)

## 2025-01-10 PROCEDURE — 80053 COMPREHEN METABOLIC PANEL: CPT | Performed by: PHYSICIAN ASSISTANT

## 2025-01-10 PROCEDURE — 36415 COLL VENOUS BLD VENIPUNCTURE: CPT | Performed by: PHYSICIAN ASSISTANT

## 2025-01-10 PROCEDURE — 99999 PR PBB SHADOW E&M-EST. PATIENT-LVL IV: CPT | Mod: PBBFAC,,, | Performed by: PHYSICIAN ASSISTANT

## 2025-01-10 RX ORDER — FEZOLINETANT 45 MG/1
45 TABLET, FILM COATED ORAL DAILY
Qty: 30 TABLET | Refills: 3 | Status: SHIPPED | OUTPATIENT
Start: 2025-01-10

## 2025-01-10 RX ORDER — TRAZODONE HYDROCHLORIDE 50 MG/1
50 TABLET ORAL NIGHTLY
Qty: 30 TABLET | Refills: 11 | Status: SHIPPED | OUTPATIENT
Start: 2025-01-10 | End: 2026-01-10

## 2025-01-10 NOTE — PROGRESS NOTES
Subjective:      Shae Trotter is a 54 y.o. female with history of BRCA2 mutation and triple negative breast cancer s/p bilateral mastectomy and hyst/bso who presents for survivorship follow up. She is taking Veozah 45mg QD. Initially hot flashes resolved but has started to have some again. She has been also having a hard time staying asleep. Waking around 3-4AM every night. Reports that her mood is good. Denies any increased anxiety or worry. Taking mg glycinate at night. Walking 2x per week. Reduced caffeine during the day and eating balanced diet.   Gradual worsening hair thinning. She does see dermatology.     Oncology history: Triple negative left breast cancer in 2006 s/p left breast mastectomy in 5/2016 and tx with adjuvant chemotherapy. Menopause with chemo but then cycles returned. Positive for BRCA2 mutation. She is s/pTLH/BSO in 2015 with Dr. Aguilar and prophylactic right breast mastectomy in 2016. Surgical menopause at age 44.     5/16/2024 LABS  AST 19  ALT 17  9/30/2024  AST 16  ALT 10     PCP: Dalila Phoenix MD    Routine labs: 5/16/2024  WWE: 9/14/2023 with Dr. Tapia  Pap smear: s/p hyst  Mammogram: s/p bilateral mastectomy  DEXA: 10/1/2024 osteopenia  Colonoscopy: 6/20/2023 iFOBT    No visits with results within 3 Month(s) from this visit.   Latest known visit with results is:   Lab Visit on 09/30/2024   Component Date Value Ref Range Status    Sodium 09/30/2024 143  136 - 145 mmol/L Final    Potassium 09/30/2024 4.1  3.5 - 5.1 mmol/L Final    Chloride 09/30/2024 108  95 - 110 mmol/L Final    CO2 09/30/2024 26  23 - 29 mmol/L Final    Glucose 09/30/2024 83  70 - 110 mg/dL Final    BUN 09/30/2024 13  6 - 20 mg/dL Final    Creatinine 09/30/2024 0.8  0.5 - 1.4 mg/dL Final    Calcium 09/30/2024 9.4  8.7 - 10.5 mg/dL Final    Total Protein 09/30/2024 7.2  6.0 - 8.4 g/dL Final    Albumin 09/30/2024 3.9  3.5 - 5.2 g/dL Final    Total Bilirubin 09/30/2024 0.4  0.1 - 1.0 mg/dL Final    Alkaline  Phosphatase 2024 78  55 - 135 U/L Final    AST 2024 16  10 - 40 U/L Final    ALT 2024 10  10 - 44 U/L Final    eGFR 2024 >60  >60 mL/min/1.73 m^2 Final    Anion Gap 2024 9  8 - 16 mmol/L Final       Past Medical History:   Diagnosis Date    Allergy     taking allx shots since     Breast cancer 2006    breast cancer - tx with mastectomy, chemo - followed by Sunny Basurto    Genetic testing 2014    BRCA2 deleterious mutation    Other screening mammogram 2015    Seasonal allergies      Past Surgical History:   Procedure Laterality Date    COSMETIC SURGERY      right mastectomy after genetic testing returned    D&C hysterscope.      HYSTERECTOMY  4/13/15    Robotic LH/BSO     Left mastectomy and chemo Left 2006    TRAM flap reconstruction     MASTECTOMY Left 2006    RI REMOVAL OF OVARY/TUBE(S)      Right breast cysts removed      tram flap  Left 2006    breast reconstruction.      Social History     Tobacco Use    Smoking status: Never     Passive exposure: Never    Smokeless tobacco: Never   Substance Use Topics    Alcohol use: No     Alcohol/week: 0.0 standard drinks of alcohol    Drug use: No     Family History   Problem Relation Name Age of Onset    Hyperlipidemia Mother      Arrhythmia Mother          on Digoxin    Cataracts Mother      Diabetes Father      Hyperlipidemia Father      Heart disease Father  64         2 to MI    No Known Problems Brother      No Known Problems Daughter      Allergic rhinitis Son      No Known Problems Brother      Ovarian cancer Neg Hx      Uterine cancer Neg Hx      Breast cancer Neg Hx      Colon cancer Neg Hx      Amblyopia Neg Hx      Blindness Neg Hx      Cancer Neg Hx      Glaucoma Neg Hx      Hypertension Neg Hx      Retinal detachment Neg Hx      Strabismus Neg Hx      Macular degeneration Neg Hx      Stroke Neg Hx      Thyroid disease Neg Hx      Melanoma Neg Hx       OB History    Para Term  AB Living   2 2 2      2   SAB IAB Ectopic Multiple Live Births                  # Outcome Date GA Lbr Rodriguez/2nd Weight Sex Type Anes PTL Lv   2 Term 10/10/02 40w0d   F Vag-Spont EPI     1 Term 01/15/91 40w0d  2.722 kg (6 lb) M Vag-Spont EPI         Current Outpatient Medications:     cetirizine (ZYRTEC) 10 MG tablet, Take 2 tablets (20 mg total) by mouth 2 (two) times daily. Start taking 5/15/2022 and continue through 5/17/2022. Take prior to Allergy appointment for 3 days, Disp: 12 tablet, Rfl: 0    EPINEPHrine (EPIPEN) 0.3 mg/0.3 mL AtIn, Inject 0.3 mL (0.3 mg total) into the muscle once for 1 dose during anaphylaxis reaction, Disp: 2 each, Rfl: 0    fezolinetant (VEOZAH) 45 mg Tab, Take 1 tablet by mouth once daily., Disp: 30 tablet, Rfl: 3    fluocinolone acetonide oiL (DERMOTIC OIL) 0.01 % Drop, Place 5 drops in ear(s) 2 (two) times daily as needed (itching)., Disp: 20 mL, Rfl: 2    fluocinonide-emollient (FLUOCINONIDE-E) 0.05 % Crea, Apply topically 2 (two) times daily on body as needed for eczema., Disp: 60 g, Rfl: 1    fluticasone propionate (FLONASE) 50 mcg/actuation nasal spray, Instill 2 sprays (100 mcg total) by Each Nostril route once daily., Disp: 16 g, Rfl: 2    levocetirizine (XYZAL) 5 MG tablet, Take 1 tablet (5 mg total) by mouth every evening., Disp: 90 tablet, Rfl: 3    traZODone (DESYREL) 50 MG tablet, Take 1 tablet (50 mg total) by mouth every evening., Disp: 30 tablet, Rfl: 11    tretinoin (RETIN-A) 0.025 % cream, Compound tretinoin 0.025% / azelaic acid 8% / niacinamide 2% cream. Apply a pea-sized amount to entire face qhs., Disp: 30 g, Rfl: 5    triamcinolone acetonide 0.1% (KENALOG) 0.1 % cream, Apply topically 2 (two) times daily., Disp: 45 g, Rfl: 0    Current Facility-Administered Medications:     Allergy Mix, , Subcutaneous, 1 time in Clinic/[facility] John C., MD    Allergy Mix, , Subcutaneous, 1 time in Clinic/[facility], Sunny Mujica MD    EPINEPHrine (PF) injection 0.5 mg, 0.5 mg, Subcutaneous, 1 time in  "Clinic/[facility],     The 10-year ASCVD risk score (Jagdish GREEN, et al., 2019) is: 1.4%    Values used to calculate the score:      Age: 54 years      Sex: Female      Is Non- : Yes      Diabetic: No      Tobacco smoker: No      Systolic Blood Pressure: 104 mmHg      Is BP treated: No      HDL Cholesterol: 85 mg/dL      Total Cholesterol: 272 mg/dL    Review of Systems:  General: No fever, chills, or weight loss.  Chest: No chest pain, shortness of breath, or palpitations.  Breast: No pain, masses, or nipple discharge.  Vulva: No pain, lesions, or itching.  Vagina: No relaxation, itching, discharge, or lesions.  Abdomen: No pain, nausea, vomiting, diarrhea, or constipation.  Urinary: No incontinence, nocturia, frequency, or dysuria.  Extremities:  No leg cramps, edema, or calf pain.  Neurologic: No headaches, dizziness, or visual changes.    Objective:     Vitals:    01/10/25 1034   BP: 104/72   Pulse: 69   Weight: 46.7 kg (103 lb)   Height: 5' 2" (1.575 m)   PainSc: 0-No pain     Body mass index is 18.84 kg/m².    PHYSICAL EXAM:  APPEARANCE: Well nourished, well developed, in no acute distress.  AFFECT: WNL, alert and oriented x 3      Assessment:    Menopausal symptoms  -     Comprehensive Metabolic Panel; Future; Expected date: 01/10/2025  -     fezolinetant (VEOZAH) 45 mg Tab; Take 1 tablet by mouth once daily.  Dispense: 30 tablet; Refill: 3    Insomnia, unspecified type  -     traZODone (DESYREL) 50 MG tablet; Take 1 tablet (50 mg total) by mouth every evening.  Dispense: 30 tablet; Refill: 11    BRCA2 gene mutation positive    Personal history of breast cancer        Plan:   Continue Veozah 45mg QD  CMP today  Monitor LFTs [id number] for the first year  Start trazodone 50mg QHS PRN  Reviewed side effects  Can also add otc melatonin XR 2-5mg QHS if needed  Reviewed sleep hygiene and benefits of exercise.  Continue otc mg glycinate  Consider adding effexor if hot flashes worsen.  Otc supplements " and topicals reviewed for hair thinning  Declines additional labs for hair loss today, but will follow up with dermatology if does not improve.  Follow up in 3 months.    Instructed patient to call if she experiences any side effects or has any questions.    I spent a total of 25 minutes on the day of the visit.This includes face to face time and non-face to face time preparing to see the patient (eg, review of tests), obtaining and/or reviewing separately obtained history, documenting clinical information in the electronic or other health record, independently interpreting results and communicating results to the patient/family/caregiver, or care coordinator.

## 2025-01-13 ENCOUNTER — CLINICAL SUPPORT (OUTPATIENT)
Dept: ALLERGY | Facility: CLINIC | Age: 55
End: 2025-01-13
Payer: COMMERCIAL

## 2025-01-13 DIAGNOSIS — J30.9 CHRONIC ALLERGIC RHINITIS: Primary | ICD-10-CM

## 2025-01-13 PROCEDURE — 99999 PR PBB SHADOW E&M-EST. PATIENT-LVL I: CPT | Mod: PBBFAC,,,

## 2025-01-13 PROCEDURE — 95117 IMMUNOTHERAPY INJECTIONS: CPT | Mod: S$GLB,,, | Performed by: ALLERGY & IMMUNOLOGY

## 2025-01-19 ENCOUNTER — PATIENT MESSAGE (OUTPATIENT)
Dept: ALLERGY | Facility: CLINIC | Age: 55
End: 2025-01-19
Payer: COMMERCIAL

## 2025-01-22 ENCOUNTER — PATIENT MESSAGE (OUTPATIENT)
Dept: ALLERGY | Facility: CLINIC | Age: 55
End: 2025-01-22
Payer: COMMERCIAL

## 2025-01-27 ENCOUNTER — PATIENT MESSAGE (OUTPATIENT)
Dept: OBSTETRICS AND GYNECOLOGY | Facility: CLINIC | Age: 55
End: 2025-01-27
Payer: COMMERCIAL

## 2025-01-28 ENCOUNTER — CLINICAL SUPPORT (OUTPATIENT)
Dept: ALLERGY | Facility: CLINIC | Age: 55
End: 2025-01-28
Payer: COMMERCIAL

## 2025-01-28 DIAGNOSIS — J30.9 CHRONIC ALLERGIC RHINITIS: Primary | ICD-10-CM

## 2025-01-28 PROCEDURE — 99999 PR PBB SHADOW E&M-EST. PATIENT-LVL I: CPT | Mod: PBBFAC,,,

## 2025-01-28 PROCEDURE — 95117 IMMUNOTHERAPY INJECTIONS: CPT | Mod: S$GLB,,, | Performed by: STUDENT IN AN ORGANIZED HEALTH CARE EDUCATION/TRAINING PROGRAM

## 2025-02-04 ENCOUNTER — CLINICAL SUPPORT (OUTPATIENT)
Dept: ALLERGY | Facility: CLINIC | Age: 55
End: 2025-02-04
Payer: COMMERCIAL

## 2025-02-04 DIAGNOSIS — J30.9 CHRONIC ALLERGIC RHINITIS: Primary | ICD-10-CM

## 2025-02-04 PROCEDURE — 95117 IMMUNOTHERAPY INJECTIONS: CPT | Mod: S$GLB,,, | Performed by: STUDENT IN AN ORGANIZED HEALTH CARE EDUCATION/TRAINING PROGRAM

## 2025-02-04 PROCEDURE — 99999 PR PBB SHADOW E&M-EST. PATIENT-LVL I: CPT | Mod: PBBFAC,,,

## 2025-02-18 ENCOUNTER — CLINICAL SUPPORT (OUTPATIENT)
Dept: ALLERGY | Facility: CLINIC | Age: 55
End: 2025-02-18
Payer: COMMERCIAL

## 2025-02-18 DIAGNOSIS — J30.9 CHRONIC ALLERGIC RHINITIS: Primary | ICD-10-CM

## 2025-03-11 ENCOUNTER — CLINICAL SUPPORT (OUTPATIENT)
Dept: ALLERGY | Facility: CLINIC | Age: 55
End: 2025-03-11
Payer: COMMERCIAL

## 2025-03-11 DIAGNOSIS — J30.9 CHRONIC ALLERGIC RHINITIS: Primary | ICD-10-CM

## 2025-03-11 PROCEDURE — 95117 IMMUNOTHERAPY INJECTIONS: CPT | Mod: S$GLB,,, | Performed by: STUDENT IN AN ORGANIZED HEALTH CARE EDUCATION/TRAINING PROGRAM

## 2025-03-11 PROCEDURE — 99999 PR PBB SHADOW E&M-EST. PATIENT-LVL I: CPT | Mod: PBBFAC,,,

## 2025-03-25 PROCEDURE — 95165 ANTIGEN THERAPY SERVICES: CPT | Mod: S$GLB,,, | Performed by: ALLERGY & IMMUNOLOGY

## 2025-04-04 ENCOUNTER — CLINICAL SUPPORT (OUTPATIENT)
Dept: ALLERGY | Facility: CLINIC | Age: 55
End: 2025-04-04
Payer: COMMERCIAL

## 2025-04-04 ENCOUNTER — OFFICE VISIT (OUTPATIENT)
Dept: ALLERGY | Facility: CLINIC | Age: 55
End: 2025-04-04
Payer: COMMERCIAL

## 2025-04-04 ENCOUNTER — TELEPHONE (OUTPATIENT)
Dept: ALLERGY | Facility: CLINIC | Age: 55
End: 2025-04-04

## 2025-04-04 DIAGNOSIS — T78.2XXD ANAPHYLAXIS, SUBSEQUENT ENCOUNTER: ICD-10-CM

## 2025-04-04 DIAGNOSIS — J30.9 CHRONIC ALLERGIC RHINITIS: Primary | ICD-10-CM

## 2025-04-04 PROCEDURE — 99999 PR PBB SHADOW E&M-EST. PATIENT-LVL I: CPT | Mod: PBBFAC,,,

## 2025-04-04 PROCEDURE — 95117 IMMUNOTHERAPY INJECTIONS: CPT | Mod: S$GLB,,, | Performed by: ALLERGY & IMMUNOLOGY
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
## 2025-04-15 ENCOUNTER — DOCUMENTATION ONLY (OUTPATIENT)
Dept: ALLERGY | Facility: CLINIC | Age: 55
End: 2025-04-15
Payer: COMMERCIAL

## 2025-04-30 ENCOUNTER — CLINICAL SUPPORT (OUTPATIENT)
Dept: ALLERGY | Facility: CLINIC | Age: 55
End: 2025-04-30
Payer: COMMERCIAL

## 2025-04-30 DIAGNOSIS — J30.9 CHRONIC ALLERGIC RHINITIS: Primary | ICD-10-CM

## 2025-04-30 PROCEDURE — 95117 IMMUNOTHERAPY INJECTIONS: CPT | Mod: S$GLB,,, | Performed by: ALLERGY & IMMUNOLOGY

## 2025-04-30 PROCEDURE — 99999 PR PBB SHADOW E&M-EST. PATIENT-LVL I: CPT | Mod: PBBFAC,,,

## 2025-05-06 ENCOUNTER — CLINICAL SUPPORT (OUTPATIENT)
Dept: ALLERGY | Facility: CLINIC | Age: 55
End: 2025-05-06
Payer: COMMERCIAL

## 2025-05-06 DIAGNOSIS — J30.9 CHRONIC ALLERGIC RHINITIS: Primary | ICD-10-CM

## 2025-05-06 PROCEDURE — 95117 IMMUNOTHERAPY INJECTIONS: CPT | Mod: S$GLB,,, | Performed by: STUDENT IN AN ORGANIZED HEALTH CARE EDUCATION/TRAINING PROGRAM

## 2025-05-06 PROCEDURE — 99999 PR PBB SHADOW E&M-EST. PATIENT-LVL I: CPT | Mod: PBBFAC,,,

## 2025-05-20 ENCOUNTER — CLINICAL SUPPORT (OUTPATIENT)
Dept: ALLERGY | Facility: CLINIC | Age: 55
End: 2025-05-20
Payer: COMMERCIAL

## 2025-05-20 DIAGNOSIS — J30.9 CHRONIC ALLERGIC RHINITIS: Primary | ICD-10-CM

## 2025-05-20 PROCEDURE — 99999 PR PBB SHADOW E&M-EST. PATIENT-LVL I: CPT | Mod: PBBFAC,,,

## 2025-05-20 PROCEDURE — 95117 IMMUNOTHERAPY INJECTIONS: CPT | Mod: S$GLB,,, | Performed by: STUDENT IN AN ORGANIZED HEALTH CARE EDUCATION/TRAINING PROGRAM

## 2025-05-26 ENCOUNTER — RESULTS FOLLOW-UP (OUTPATIENT)
Dept: PRIMARY CARE CLINIC | Facility: CLINIC | Age: 55
End: 2025-05-26

## 2025-05-26 ENCOUNTER — OFFICE VISIT (OUTPATIENT)
Dept: PRIMARY CARE CLINIC | Facility: CLINIC | Age: 55
End: 2025-05-26
Attending: FAMILY MEDICINE
Payer: COMMERCIAL

## 2025-05-26 ENCOUNTER — HOSPITAL ENCOUNTER (OUTPATIENT)
Dept: RADIOLOGY | Facility: OTHER | Age: 55
Discharge: HOME | End: 2025-05-26
Attending: FAMILY MEDICINE
Payer: COMMERCIAL

## 2025-05-26 VITALS
DIASTOLIC BLOOD PRESSURE: 69 MMHG | SYSTOLIC BLOOD PRESSURE: 103 MMHG | HEART RATE: 77 BPM | BODY MASS INDEX: 18.99 KG/M2 | WEIGHT: 103.81 LBS

## 2025-05-26 DIAGNOSIS — Z23 NEED FOR SHINGLES VACCINE: ICD-10-CM

## 2025-05-26 DIAGNOSIS — E78.2 MIXED HYPERLIPIDEMIA: ICD-10-CM

## 2025-05-26 DIAGNOSIS — Z85.3 PERSONAL HISTORY OF BREAST CANCER: ICD-10-CM

## 2025-05-26 DIAGNOSIS — M25.531 RIGHT WRIST PAIN: ICD-10-CM

## 2025-05-26 DIAGNOSIS — Z00.01 ENCOUNTER FOR GENERAL ADULT MEDICAL EXAMINATION WITH ABNORMAL FINDINGS: Primary | ICD-10-CM

## 2025-05-26 DIAGNOSIS — Z15.09 BRCA2 POSITIVE: ICD-10-CM

## 2025-05-26 DIAGNOSIS — M85.89 OSTEOPENIA OF MULTIPLE SITES: ICD-10-CM

## 2025-05-26 DIAGNOSIS — Z15.01 BRCA2 POSITIVE: ICD-10-CM

## 2025-05-26 DIAGNOSIS — Z23 NEED FOR TDAP VACCINATION: ICD-10-CM

## 2025-05-26 DIAGNOSIS — Z23 NEED FOR VACCINATION AGAINST STREPTOCOCCUS PNEUMONIAE: ICD-10-CM

## 2025-05-26 PROBLEM — R29.898 DECREASED STRENGTH OF TRUNK AND BACK: Status: RESOLVED | Noted: 2023-05-03 | Resolved: 2025-05-26

## 2025-05-26 PROBLEM — M62.838 NECK MUSCLE SPASM: Status: RESOLVED | Noted: 2020-11-05 | Resolved: 2025-05-26

## 2025-05-26 PROBLEM — Z90.13 H/O BILATERAL MASTECTOMY: Status: RESOLVED | Noted: 2023-03-22 | Resolved: 2025-05-26

## 2025-05-26 PROCEDURE — 3008F BODY MASS INDEX DOCD: CPT | Mod: CPTII,S$GLB,, | Performed by: FAMILY MEDICINE

## 2025-05-26 PROCEDURE — 1160F RVW MEDS BY RX/DR IN RCRD: CPT | Mod: CPTII,S$GLB,, | Performed by: FAMILY MEDICINE

## 2025-05-26 PROCEDURE — 73110 X-RAY EXAM OF WRIST: CPT | Mod: TC,FY,RT

## 2025-05-26 PROCEDURE — 99999 PR PBB SHADOW E&M-EST. PATIENT-LVL IV: CPT | Mod: PBBFAC,,, | Performed by: FAMILY MEDICINE

## 2025-05-26 PROCEDURE — 99213 OFFICE O/P EST LOW 20 MIN: CPT | Mod: 25,S$GLB,, | Performed by: FAMILY MEDICINE

## 2025-05-26 PROCEDURE — 1159F MED LIST DOCD IN RCRD: CPT | Mod: CPTII,S$GLB,, | Performed by: FAMILY MEDICINE

## 2025-05-26 PROCEDURE — 3078F DIAST BP <80 MM HG: CPT | Mod: CPTII,S$GLB,, | Performed by: FAMILY MEDICINE

## 2025-05-26 PROCEDURE — 99396 PREV VISIT EST AGE 40-64: CPT | Mod: S$GLB,,, | Performed by: FAMILY MEDICINE

## 2025-05-26 PROCEDURE — 3074F SYST BP LT 130 MM HG: CPT | Mod: CPTII,S$GLB,, | Performed by: FAMILY MEDICINE

## 2025-05-26 PROCEDURE — 73110 X-RAY EXAM OF WRIST: CPT | Mod: 26,RT,, | Performed by: STUDENT IN AN ORGANIZED HEALTH CARE EDUCATION/TRAINING PROGRAM

## 2025-05-26 RX ORDER — ZOSTER VACCINE RECOMBINANT, ADJUVANTED 50 MCG/0.5
KIT INTRAMUSCULAR
Qty: 1 EACH | Refills: 1 | Status: SHIPPED | OUTPATIENT
Start: 2025-05-26

## 2025-05-26 RX ORDER — ZOSTER VACCINE RECOMBINANT, ADJUVANTED 50 MCG/0.5
KIT INTRAMUSCULAR
Qty: 1 EACH | Refills: 1 | Status: CANCELLED | OUTPATIENT
Start: 2025-05-26

## 2025-05-26 NOTE — PROGRESS NOTES
FAMILY MEDICINE  [facility]    Reason for visit:   Chief Complaint   Patient presents with    Annual Exam    Establish Care         SUBJECTIVE: Shae Trotter is a 54 y.o. female  - with history of left breast cancer (2006 s/p left mastectomy), BRCA2 positive (with subsequent prophylactic right mastectomy and total hysterectomy with bilateral oophorectomy) and  hyperlipidemia presents as a new patient to establish care,  routine annual physical and discuss cholesterol.  Last PCP Dalila Crain MD    Gynecology:Bridgett Foster PA-C  Allergist: Sunny Echavarria MD    Shae reports concerns about elevated cholesterol levels, with LDL previously around 170 mg/dL. A coronary calcium score test resulted in a very low score of 4. She expresses reluctance to start statin medication, preferring alternative methods. She attempted red rice yeast supplements in the past but struggled with consistent daily use. Family history includes hypercholesterolemia, with her mother and aunt on cholesterol medication, and her father having  from a myocardial infarction. Her diet is generally healthy, rarely including red meat or fried foods, and opting for turkey-based alternatives.    She reports menopausal symptoms, including hot flashes. She started taking Veozah (fezolinetant) for hot flash management, which was initially effective but has become less so over time. Hot flashes occur primarily at night, sometimes lasting for 2 weeks consecutively before subsiding.    She mentions right wrist pain that started about 3 weeks ago, described as a soreness or bruise-like sensation between the right medal hand, wrist and elbow. The pain was initially more severe with reduced  strength but has been improving. She has been taking Aleve (naproxen) for pain management with some relief.  She does not note any inciting event.  She has not been using a wrist brace.  She denies any swelling.    She denies  any deep ache in her arm, broken bones, or current  strength issues. She denies any current bowel movement problems.              Review of Systems   All other systems reviewed and are negative.      HEALTH MAINTENANCE:   Health Maintenance   Topic Date Due    TETANUS VACCINE  Never done    Shingles Vaccine (1 of 2) Never done    Pneumococcal Vaccines (Age 50+) (1 of 1 - PCV) Never done    COVID-19 Vaccine (6 - 2024-25 season) 09/01/2024    Colorectal Cancer Screening  07/07/2027    Lipid Panel  05/16/2029    RSV Vaccine (Age 60+ and Pregnant patients) (1 - 1-dose 75+ series) 11/04/2044    Hepatitis C Screening  Completed    Influenza Vaccine  Completed    HIV Screening  Completed     Health Maintenance Topics with due status: Not Due       Topic Last Completion Date    Lipid Panel 05/16/2024    Colorectal Cancer Screening 07/07/2024    RSV Vaccine (Age 60+ and Pregnant patients) Not Due     Health Maintenance Due   Topic Date Due    TETANUS VACCINE  Never done    Shingles Vaccine (1 of 2) Never done    Pneumococcal Vaccines (Age 50+) (1 of 1 - PCV) Never done    COVID-19 Vaccine (6 - 2024-25 season) 09/01/2024       HISTORY:   Past Medical History:   Diagnosis Date    Abnormal mammogram with microcalcification 07/27/2015    Allergy     taking allx shots since 2006    Breast cancer 2006    breast cancer - tx with mastectomy, chemo - followed by Sunny Basurto    Breast cancer screening, high risk patient 11/25/2014    Chromosomal hereditary disorder 11/25/2014    Genetic susceptibility to malignant neoplasm of breast 12/16/2015    Genetic testing 11/2014    BRCA2 deleterious mutation    H/O bilateral mastectomy 03/22/2023    Other screening mammogram 06/24/2015    S/P laparoscopic hysterectomy 04/13/2015    Seasonal allergies        Past Surgical History:   Procedure Laterality Date    COSMETIC SURGERY      right mastectomy after genetic testing returned    D&C hysterscope.      HYSTERECTOMY  04/13/2015    Robotic  LH/BSO     Left mastectomy and chemo Left 2006    TRAM flap reconstruction     MASTECTOMY Left 2006    WY REMOVAL OF OVARY/TUBE(S)      Right breast cysts removed      TOTAL ABDOMINAL HYSTERECTOMY W/ BILATERAL SALPINGOOPHORECTOMY Bilateral     tram flap  Left 2006    breast reconstruction.        Family History   Problem Relation Name Age of Onset    Hyperlipidemia Mother      Arrhythmia Mother          on Digoxin    Cataracts Mother      Diabetes Father      Hyperlipidemia Father      Heart disease Father  64         2/ to MI    No Known Problems Brother      No Known Problems Brother      No Known Problems Daughter      Allergic rhinitis Son      Hyperlipidemia Maternal Aunt      Ovarian cancer Neg Hx      Uterine cancer Neg Hx      Breast cancer Neg Hx      Colon cancer Neg Hx      Amblyopia Neg Hx      Blindness Neg Hx      Cancer Neg Hx      Glaucoma Neg Hx      Hypertension Neg Hx      Retinal detachment Neg Hx      Strabismus Neg Hx      Macular degeneration Neg Hx      Stroke Neg Hx      Thyroid disease Neg Hx      Melanoma Neg Hx         Social History[1]    Social History     Social History Narrative    From [facility] with      daughter 21 and son 32    Not exercising reg       ALLERGIES:   Review of patient's allergies indicates:   Allergen Reactions    Fish containing products Anaphylaxis    Aspirin      Other reaction(s): Hives    Fruit extracts Hives     Other reaction(s): Unknown  Allergic to most fruits - hives/swelling    Minocycline Other (See Comments)     dizziness    Penicillins Hives    [place] Hives       MEDS:   Current Outpatient Medications on File Prior to Visit   Medication Sig Dispense Refill Last Dose/Taking    fezolinetant (VEOZAH) 45 mg Tab Take 1 tablet by mouth once daily. 30 tablet 3 Taking    EPINEPHrine (EPIPEN) 0.3 mg/0.3 mL AtIn Inject 0.3 mL (0.3 mg total) into the muscle once for 1 dose during anaphylaxis reaction 2 each 0     fluocinolone  acetonide oiL (DERMOTIC OIL) 0.01 % Drop Place 5 drops in ear(s) 2 (two) times daily as needed (itching). 20 mL 2     fluocinonide-emollient (FLUOCINONIDE-E) 0.05 % Crea Apply topically 2 (two) times daily on body as needed for eczema. 60 g 1     tretinoin (RETIN-A) 0.025 % cream Compound tretinoin 0.025% / azelaic acid 8% / niacinamide 2% cream. Apply a pea-sized amount to entire face qhs. 30 g 5     triamcinolone acetonide 0.1% (KENALOG) 0.1 % cream Apply topically 2 (two) times daily. 45 g 0     [DISCONTINUED] cetirizine (ZYRTEC) 10 MG tablet Take 2 tablets (20 mg total) by mouth 2 (two) times daily. Start taking 5/15/2022 and continue through 5/17/2022. Take prior to Allergy appointment for 3 days 12 tablet 0     [DISCONTINUED] levocetirizine (XYZAL) 5 MG tablet Take 1 tablet (5 mg total) by mouth every evening. 90 tablet 3     [DISCONTINUED] traZODone (DESYREL) 50 MG tablet Take 1 tablet (50 mg total) by mouth every evening. 30 tablet 11          Vital signs:   Vitals:    05/26/25 1154   BP: 103/69   Pulse: 77   Weight: 47.1 kg (103 lb 13.4 oz)     Body mass index is 18.99 kg/m².    PHYSICAL EXAM:     Physical Exam  Vitals reviewed.   Constitutional:       General: She is not in acute distress.  HENT:      Head: Normocephalic and atraumatic.      Right Ear: Tympanic membrane and ear canal normal.      Left Ear: Tympanic membrane and ear canal normal.   Eyes:      General: No scleral icterus.     Conjunctiva/sclera: Conjunctivae normal.   Neck:      Thyroid: No thyromegaly.      Vascular: No carotid bruit.   Cardiovascular:      Rate and Rhythm: Normal rate and regular rhythm.      Heart sounds: Normal heart sounds. No murmur heard.     No friction rub. No gallop.   Pulmonary:      Effort: Pulmonary effort is normal.      Breath sounds: Normal breath sounds. No wheezing or rales.   Abdominal:      General: Bowel sounds are normal. There is no distension.      Palpations: Abdomen is soft.      Tenderness: There  is no abdominal tenderness.   Musculoskeletal:        Hands:       Cervical back: Normal range of motion and neck supple.      Right lower leg: No edema.      Left lower leg: No edema.   Lymphadenopathy:      Cervical: No cervical adenopathy.   Skin:     General: Skin is warm.      Capillary Refill: Capillary refill takes less than 2 seconds.   Neurological:      Mental Status: She is alert.             PERTINENT RESULTS:   No visits with results within 1 Week(s) from this visit.   Latest known visit with results is:   Lab Visit on 01/10/2025   Component Date Value Ref Range Status    Sodium 01/10/2025 139  136 - 145 mmol/L Final    Potassium 01/10/2025 3.9  3.5 - 5.1 mmol/L Final    Chloride 01/10/2025 103  95 - 110 mmol/L Final    CO2 01/10/2025 24  23 - 29 mmol/L Final    Glucose 01/10/2025 88  70 - 110 mg/dL Final    BUN 01/10/2025 16  6 - 20 mg/dL Final    Creatinine 01/10/2025 0.7  0.5 - 1.4 mg/dL Final    Calcium 01/10/2025 9.7  8.7 - 10.5 mg/dL Final    Total Protein 01/10/2025 7.5  6.0 - 8.4 g/dL Final    Albumin 01/10/2025 4.0  3.5 - 5.2 g/dL Final    Total Bilirubin 01/10/2025 0.4  0.1 - 1.0 mg/dL Final    Comment: For infants and newborns, interpretation of results should be based  on gestational age, weight and in agreement with clinical  observations.    Premature Infant recommended reference ranges:  Up to 24 hours.............<8.0 mg/dL  Up to 48 hours............<12.0 mg/dL  3-5 days..................<15.0 mg/dL  6-29 days.................<15.0 mg/dL      Alkaline Phosphatase 01/10/2025 79  40 - 150 U/L Final    AST 01/10/2025 14  10 - 40 U/L Final    ALT 01/10/2025 12  10 - 44 U/L Final    eGFR 01/10/2025 >60  >60 mL/min/1.73 m^2 Final    Anion Gap 01/10/2025 12  8 - 16 mmol/L Final     Lab Results   Component Value Date    CHOL 272 (H) 05/16/2024    CHOL 239 (H) 08/02/2023    CHOL 274 (H) 03/24/2023      Lab Results   Component Value Date    HDL 85 (H) 05/16/2024    HDL 63 08/02/2023    HDL 82 (H)  03/24/2023     Lab Results   Component Value Date    LDLCALC 173.2 (H) 05/16/2024    LDLCALC 162.2 (H) 08/02/2023    LDLCALC 180.6 (H) 03/24/2023      Lab Results   Component Value Date    TRIG 69 05/16/2024    TRIG 69 08/02/2023    TRIG 57 03/24/2023     Lab Results   Component Value Date    TOTALCHOLEST 3.2 05/16/2024    TOTALCHOLEST 3.8 08/02/2023    TOTALCHOLEST 3.3 03/24/2023     Lab Results   Component Value Date    NONHDLCHOL 187 05/16/2024    NONHDLCHOL 176 08/02/2023    NONHDLCHOL 192 03/24/2023     Lab Results   Component Value Date    CHOLHDL 31.3 05/16/2024    CHOLHDL 26.4 08/02/2023    CHOLHDL 29.9 03/24/2023         Cologuard  Specimen: Stool - Rectum structure (body structure)   Ref Range & Units 10 mo ago Comments   Test Result Negative Negative   NEGATIVE TEST RESULT. A negative Cologuard result indicates a low likelihood that a colorectal cancer (CRC) or advanced adenoma (adenomatous polyps with more advanced pre-malignant features)  is present. The chance that a person with a negative Cologuard test has a colorectal cancer is less than 1 in 1500 (negative predictive value >99.9%) or has an  advanced adenoma is less than  5.3% (negative predictive value 94.7%). These data are based on a prospective cross-sectional study of 10,000 individuals at average risk for colorectal cancer who were screened with both Cologuard and colonoscopy. (Kathia MARY et al, N Engl J Med 2014;370(14):2568-3263) The normal value (reference range) for this assay is negative.    COLOGUARD RE-SCREENING RECOMMENDATION: Periodic colorectal cancer screening is an important part of preventive healthcare for asymptomatic individuals at average risk for colorectal cancer.  Following a negative Cologuard result, the American Cancer Society and U.S. Multi-Society Task Force screening guidelines recommend a Cologuard re-screening interval of 3 years.  References: American Cancer Society Guideline for Colorectal Cancer Screening:  https://www.cancer.org/cancer/colon-rectal-cancer/jechwuclt-pidhdzsds-qzdsnvg/acs-recommendations.html.; Jeff DK, Rodrick CR, Brady MENENDEZK, Colorectal Cancer Screening: Recommendations for Physicians and Patients from the U.S. Multi-Society Task Force on Colorectal Cancer Screening , Am J Gastroenterology 2017; 112:9955-0151.    TEST DESCRIPTION: Composite algorithmic analysis of stool DNA-biomarkers with hemoglobin immunoassay.   Quantitative values of individual biomarkers are not reportable and are not associated with individual biomarker result reference ranges. Cologuard is intended for colorectal cancer screening of adults of either sex, 45 years or older, who are at average-risk for colorectal cancer (CRC). Cologuard has been approved for use by the U.S. FDA. The performance of Cologuard was established in a cross sectional study of average-risk adults aged 50-84. Cologuard performance in patients ages 45 to 49 years was estimated by sub-group analysis of near-age groups. Colonoscopies performed for a positive result may find as the most clinically significant lesion: colorectal cancer [4.0%], advanced adenoma (including sessile serrated polyps greater than or equal to 1cm diameter) [20%] or non- advanced adenoma [31%]; or no colorectal neoplasia [45%]. These estimates are derived from a prospective cross-sectional screening study of 10,000 individuals at average risk for colorectal cancer who were screened with both Cologuard and colonoscopy. (Kathia Calloway al, N Engl J Med 2014;370(14):5037-1620.) Cologuard may produce a false negative or false positive result (no colorectal cancer or precancerous polyp present at colonoscopy follow up). A negative Cologuard test result does not guarantee the absence of CRC or advanced adenoma (pre-cancer). The current Cologuard screening interval is every 3 years. (American Cancer Society and U.S. Multi-Society Task Force). Cologuard performance data in a 10,000 patient  pivotal study using colonoscopy as the reference method can be accessed at the following location: www.Edfa[employer]ly/results. Additional description of the Cologuard test process, warnings and precautions can be found at www.[employer] [facility].   Resulting Agency  [facility] (CLIA #:[id number])    Specimen Collected: 07/07/24 11:16    Performed by: [facility] (CLIA #:[id number]) Last Resulted: 07/17/24 00:30   Received From: [facility]  Result Received: 07/23/24 07:35     Aamir Avila III, MD  [phone]  [phone]  10/1/2024 Routine     Narrative & Impression  EXAMINATION:  DXA BONE DENSITY AXIAL SKELETON 1 OR MORE SITES     CLINICAL HISTORY:  Encounter for screening for osteoporosis     TECHNIQUE:  DXA scanning was performed over the left hip and lumbar spine.  Review of the images confirms satisfactory positioning and technique.     COMPARISON:  None     FINDINGS:  BMD lumbar spine 0.944 g/cm squared.  T-score -2.2.     BMD left femoral neck is 0.748 g/cm squared.  T-score -2.1.     BMD right femoral neck is 0.789 g/cm squared.  T-score -1.8.     Impression:     Osteopenia lumbar spine, right left hip.     Ten year fracture risk:     Major osteoporotic 7.1%, hip 1.0%.     Consider FDA approved medical therapies in postmenopausal women and men aged 50 years and older, based on the following:     *A hip or vertebral (clinical or morphometric) fracture  *T score less than or equal to -2.5 at the femoral neck or spine after appropriate evaluation to exclude secondary causes.  *Low bone mass -- also known as osteopenia (T score between -1.0 and -2.5 at the femoral neck or spine) and a 10 year probability of hip fracture greater than or equal to 3% or a 10 year probability of major osteoporosis-related fracture greater than or equal to 20% based on the [place]-adapted WHO algorithm.  *Clinicians judgment and/or patient preference may indicate treatment for people with  10 year fracture probabilities is above or below these levels.        Electronically signed by: Aamir Avila MD  Date:                                            10/01/2024  Time:                                           09:52  Details    Reading Physician Reading Date Result Priority   Jani Velásquez MD  [phone]  5/29/2024 Routine     Narrative & Impression  EXAMINATION:  CT CALCIUM SCORING CARDIAC     CLINICAL HISTORY:  CAD screening, intermediate CAD risk, treadmill candidate;  Mixed hyperlipidemia     TECHNIQUE:  Gated,  low dose axial images were performed over the heart.     COMPARISON:  None.     FINDINGS:  Important information about your scan:     The following information is based on analysis of the coronary arteries only.  Calcium deposits do not correspond directly to the percentage of narrowing of the arteries.  They do correlate directly to the amount of coronary plaque, and to the risk of future coronary disease.  These calcium deposits usually begin to form years before any symptoms develop.  Early detection and modification of risk factors, such as smoking and cholesterol intake, and slow progression of coronary artery disease.     A low score suggests a low likelihood of coronary artery disease, but does not exclude the possibility of significant coronary artery narrowing.  The results should be discussed with your physician taking into account other risk factors such as age, gender, family history, diabetes, smoking or high cholesterol levels.     Should you experience chest pain, difficulty breathing, discomfort radiating into her neck or arm, or discomfort combined with lightheadedness, sweating, fainting or nausea, you should seek prompt medical attention.     Calcium score:     0-10: Very little coronary heart disease risk     11 through 100: Low to moderate coronary heart disease risk     101 through 400: Moderate to high coronary heart disease risk     Greater than 401: High  coronary heart disease risk.     SCORE SUMMARY     Your total calcium score is 4.  There is a tiny focus of calcific atherosclerosis in the LAD.  Score places the patient in the 65th percentile rank, meaning 35% of females ages 51-55 will have a higher calcium score than the patient.     Incidental findings: None     Impression:     Your total calcium score is 4.  Very little coronary heart disease risk        Electronically signed by: Jani Velásquez MD  Date:                                            05/29/2024  Time:                                           08:12        Exam Ended: 05/28/24 18:50 CDT Last Resulted: 05/29/24 08:12 CDT        ASSESSMENT/PLAN:    1. Encounter for general adult medical examination with abnormal findings  Overview:  - preventative health counseling  - reviewed current recommendations for breast cancer screening.  BRCA2 positive Increased risk with history of bilateral mastectomy  - reviewed current recommendations for cervical cancer screening.  History of hysterectomy  - reviewed current recommendations for colon cancer screening.  07/2024 Cologuard negative.  Repeat colon cancer screening due 2027      Orders:  -     TSH; Future; Expected date: 05/26/2025  -     Hemoglobin A1C; Future; Expected date: 05/26/2025  -     Comprehensive Metabolic Panel; Future; Expected date: 05/26/2025  -     CBC Auto Differential; Future; Expected date: 05/26/2025  -     Apolipoprotein B; Future; Expected date: 05/26/2025  -     Lipoprotein A (LPA); Future; Expected date: 05/26/2025  -     Lipoprotein Analysis, by NMR; Future; Expected date: 05/26/2025    2. Right wrist pain  Overview:  - exam benign   -recommend wrist splint and wear at least at night if not throughout the day   -recommend wrist x-ray   -if not improved in 6 weeks recommend further evaluation    Orders:  -     X-Ray Wrist Complete 3 views Right; Future; Expected date: 05/26/2025    3. Mixed hyperlipidemia  Overview:  LDL  Cholesterol   Date Value Ref Range Status   05/16/2024 173.2 (H) 63.0 - 159.0 mg/dL Final     Comment:     The National Cholesterol Education Program (NCEP) has set the  following guidelines (reference values) for LDL Cholesterol:  Optimal.......................<130 mg/dL  Borderline High...............130-159 mg/dL  High..........................160-189 mg/dL  Very High.....................>190 mg/dL       The 10-year ASCVD risk score (Jagdish GREEN, et al., 2019) is: 1.4%    Values used to calculate the score:      Age: 54 years      Sex: Female      Is Non- : Yes      Diabetic: No      Tobacco smoker: No      Systolic Blood Pressure: 104 mmHg      Is BP treated: No      HDL Cholesterol: 85 mg/dL      Total Cholesterol: 272 mg/dL    - 5/28/24 CCS = 4  - low risk  - family history of hyperlipidemia and heart disease   -recommend advanced lipid panel with lipoprotein a as well as lipoprotein B   -she is hesitant for medications and admits unable to sustain a vegetarian diet   -options are starting statin verses taking over-the-counter red yeast rice (the I discussed this is likely less effective) and repeating lipids in 3 months pending results   - questions elicited and answered  - encouraged to patient to notify me of any questions or concerns    Orders:  -     Apolipoprotein B; Future; Expected date: 05/26/2025  -     Lipoprotein A (LPA); Future; Expected date: 05/26/2025  -     Lipoprotein Analysis, by NMR; Future; Expected date: 05/26/2025    4. BRCA2 positive  Overview:  - 2006 history of ER negative, MO negative and HER2 negative left  breast cancer status post left mastectomy  - subsequent BRCA2 gene testing that was positive and had prophylactic right mastectomy and hysterectomy with oophorectomy      5. Personal history of breast cancer  Overview:  - 2006 unilateral left mastectomy ER-, MO-, HER-2 negative  - 2015 BRCA 2 positive then right mastectomy and hysterectomy      6. Osteopenia  of multiple sites  Overview:  10/01/2024 DEXA: BMD lumbar spine 0.944 g/cm squared.  T-score -2.2.  BMD left femoral neck is 0.748 g/cm squared.  T-score -2.1.  BMD right femoral neck is 0.789 g/cm squared.  T-score -1.8.  Major osteoporotic 7.1%, hip 1.0%  - fall prevention by keeping your legs and hips strong and maintaining good balance  - weight bearing exercises like walking, squats, stair and jogging if able  - over the counter vitamin D3 4888-0755 units daily  - dietary calcium 1200 mg per day with diet or supplements   - repeat bone density scan in 2-3 years        7. Need for vaccination against Streptococcus pneumoniae  -     pneumoc 20-delores conj-dip cr,PF, (PREVNAR-20, PF,) 0.5 mL Syrg injection; Inject 0.5 mLs into the muscle once. for 1 dose  Dispense: 0.5 mL; Refill: 0    8. Need for Tdap vaccination  -     DIPH,PERTUSS,ACEL,,TET VAC,PF, ADULT (ADACEL) 2 Lf-(2.5-5-3-5 mcg)-5Lf/0.5 mL Syrg; Inject 0.5 mLs into the muscle once. for 1 dose  Dispense: 0.5 mL; Refill: 0    9. Need for shingles vaccine  -     varicella-zoster gE-AS01B, PF, (SHINGRIX, PF,) 50 mcg/0.5 mL injection; Inject 0.5mL IM at 0 and 2-6 months  Dispense: 1 each; Refill: 1          ORDERS:   Orders Placed This Encounter    X-Ray Wrist Complete 3 views Right    TSH    Hemoglobin A1C    Comprehensive Metabolic Panel    CBC Auto Differential    Apolipoprotein B    Lipoprotein A (LPA)    Lipoprotein Analysis, by NMR    varicella-zoster gE-AS01B, PF, (SHINGRIX, PF,) 50 mcg/0.5 mL injection    DIPH,PERTUSS,ACEL,,TET VAC,PF, ADULT (ADACEL) 2 Lf-(2.5-5-3-5 mcg)-5Lf/0.5 mL Syrg    pneumoc 20-delores conj-dip cr,PF, (PREVNAR-20, PF,) 0.5 mL Syrg injection       Vaccines recommended:  COVID-19, pneumococcal, Tdap and shingles    Follow up in about 1 year (around 5/26/2026), or if symptoms worsen or fail to improve. or sooner with any concerns      This encounter was dictated and transcribed using DeepScribe and [employer], please excuse any  typographical or grammatical errors.    Dr. Caroline Huynh D.O.   Family Medicine         [1]   Social History  Tobacco Use    Smoking status: Never     Passive exposure: Never    Smokeless tobacco: Never   Substance Use Topics    Alcohol use: No     Alcohol/week: 0.0 standard drinks of alcohol    Drug use: No

## 2025-05-27 ENCOUNTER — LAB VISIT (OUTPATIENT)
Dept: LAB | Facility: OTHER | Age: 55
End: 2025-05-27
Attending: FAMILY MEDICINE
Payer: COMMERCIAL

## 2025-05-27 ENCOUNTER — CLINICAL SUPPORT (OUTPATIENT)
Dept: ALLERGY | Facility: CLINIC | Age: 55
End: 2025-05-27
Payer: COMMERCIAL

## 2025-05-27 DIAGNOSIS — Z00.01 ENCOUNTER FOR GENERAL ADULT MEDICAL EXAMINATION WITH ABNORMAL FINDINGS: ICD-10-CM

## 2025-05-27 DIAGNOSIS — E78.2 MIXED HYPERLIPIDEMIA: ICD-10-CM

## 2025-05-27 DIAGNOSIS — J30.9 CHRONIC ALLERGIC RHINITIS: Primary | ICD-10-CM

## 2025-05-27 LAB
ABSOLUTE EOSINOPHIL (OHS): 0 K/UL
ABSOLUTE MONOCYTE (OHS): 0.23 K/UL (ref 0.3–1)
ABSOLUTE NEUTROPHIL COUNT (OHS): 2.57 K/UL (ref 1.8–7.7)
ALBUMIN SERPL BCP-MCNC: 3.9 G/DL (ref 3.5–5.2)
ALP SERPL-CCNC: 84 UNIT/L (ref 40–150)
ALT SERPL W/O P-5'-P-CCNC: 11 UNIT/L (ref 10–44)
ANION GAP (OHS): 9 MMOL/L (ref 8–16)
AST SERPL-CCNC: 15 UNIT/L (ref 11–45)
BASOPHILS # BLD AUTO: 0 K/UL
BASOPHILS NFR BLD AUTO: 0 %
BILIRUB SERPL-MCNC: 0.3 MG/DL (ref 0.1–1)
BUN SERPL-MCNC: 15 MG/DL (ref 6–20)
CALCIUM SERPL-MCNC: 9.3 MG/DL (ref 8.7–10.5)
CHLORIDE SERPL-SCNC: 106 MMOL/L (ref 95–110)
CO2 SERPL-SCNC: 25 MMOL/L (ref 23–29)
CREAT SERPL-MCNC: 0.7 MG/DL (ref 0.5–1.4)
EAG (OHS): 108 MG/DL (ref 68–131)
ERYTHROCYTE [DISTWIDTH] IN BLOOD BY AUTOMATED COUNT: 14.7 % (ref 11.5–14.5)
GFR SERPLBLD CREATININE-BSD FMLA CKD-EPI: >60 ML/MIN/1.73/M2
GLUCOSE SERPL-MCNC: 84 MG/DL (ref 70–110)
HBA1C MFR BLD: 5.4 % (ref 4–5.6)
HCT VFR BLD AUTO: 38.5 % (ref 37–48.5)
HGB BLD-MCNC: 12 GM/DL (ref 12–16)
IMM GRANULOCYTES # BLD AUTO: 0.02 K/UL (ref 0–0.04)
IMM GRANULOCYTES NFR BLD AUTO: 0.5 % (ref 0–0.5)
LYMPHOCYTES # BLD AUTO: 1.39 K/UL (ref 1–4.8)
MCH RBC QN AUTO: 26.4 PG (ref 27–31)
MCHC RBC AUTO-ENTMCNC: 31.2 G/DL (ref 32–36)
MCV RBC AUTO: 85 FL (ref 82–98)
NUCLEATED RBC (/100WBC) (OHS): 0 /100 WBC
PLATELET # BLD AUTO: 297 K/UL (ref 150–450)
PMV BLD AUTO: 10.5 FL (ref 9.2–12.9)
POTASSIUM SERPL-SCNC: 4.3 MMOL/L (ref 3.5–5.1)
PROT SERPL-MCNC: 7.5 GM/DL (ref 6–8.4)
RBC # BLD AUTO: 4.54 M/UL (ref 4–5.4)
RELATIVE EOSINOPHIL (OHS): 0 %
RELATIVE LYMPHOCYTE (OHS): 33 % (ref 18–48)
RELATIVE MONOCYTE (OHS): 5.5 % (ref 4–15)
RELATIVE NEUTROPHIL (OHS): 61 % (ref 38–73)
SODIUM SERPL-SCNC: 140 MMOL/L (ref 136–145)
TSH SERPL-ACNC: 1.32 UIU/ML (ref 0.4–4)
WBC # BLD AUTO: 4.21 K/UL (ref 3.9–12.7)

## 2025-05-27 PROCEDURE — 82172 ASSAY OF APOLIPOPROTEIN: CPT

## 2025-05-27 PROCEDURE — 83695 ASSAY OF LIPOPROTEIN(A): CPT

## 2025-05-27 PROCEDURE — 84132 ASSAY OF SERUM POTASSIUM: CPT

## 2025-05-27 PROCEDURE — 84443 ASSAY THYROID STIM HORMONE: CPT

## 2025-05-27 PROCEDURE — 95117 IMMUNOTHERAPY INJECTIONS: CPT | Mod: S$GLB,,, | Performed by: STUDENT IN AN ORGANIZED HEALTH CARE EDUCATION/TRAINING PROGRAM

## 2025-05-27 PROCEDURE — 82247 BILIRUBIN TOTAL: CPT

## 2025-05-27 PROCEDURE — 36415 COLL VENOUS BLD VENIPUNCTURE: CPT

## 2025-05-27 PROCEDURE — 83704 LIPOPROTEIN BLD QUAN PART: CPT

## 2025-05-27 PROCEDURE — 85025 COMPLETE CBC W/AUTO DIFF WBC: CPT

## 2025-05-27 PROCEDURE — 83036 HEMOGLOBIN GLYCOSYLATED A1C: CPT

## 2025-05-27 PROCEDURE — 99999 PR PBB SHADOW E&M-EST. PATIENT-LVL I: CPT | Mod: PBBFAC,,,

## 2025-05-29 LAB — APO B SERPL-MCNC: 116 MG/DL

## 2025-05-30 LAB — W LP(A): 225 NMOL/L

## 2025-06-01 ENCOUNTER — PATIENT MESSAGE (OUTPATIENT)
Dept: PRIMARY CARE CLINIC | Facility: CLINIC | Age: 55
End: 2025-06-01
Payer: COMMERCIAL

## 2025-06-01 DIAGNOSIS — E78.2 MIXED HYPERLIPIDEMIA: Primary | ICD-10-CM

## 2025-06-01 LAB
AR EER LIPOFIT BY NMR: ABNORMAL
AR HDL CHOLESTEROL: 78 MG/DL
AR HDL PARTICLE NUMBER, NMR: >41 UMOL/L
AR HDL PARTICLE SIZE, NMR: 9.6 NM
AR LARGE HDL PARTICLE NUMBER, NMR: 13.9 UMOL/L
AR LARGE VLDL PARTICLE NUMBER, NMR: 1.8 NMOL/L
AR LDL CHOLESTEROL, CALC: 167 MG/DL
AR LDL PARTICLE NUMBER, NMR: 1800 NMOL/L
AR LDL PARTICLE SIZE, NMR: 21.8 NM
AR SMALL LDL PARTICLE NUMBER, NMR: <165 NMOL/L
AR TOTAL CHOLESTEROL: 259 MG/DL
AR TRIGLYCERIDES: 72 MG/DL
AR VLDL PARTICLE SIZE, NMR: 42.8 NM

## 2025-06-02 RX ORDER — UBIDECARENONE 100 MG
100 CAPSULE ORAL DAILY
Qty: 30 CAPSULE | Refills: 11 | Status: SHIPPED | OUTPATIENT
Start: 2025-06-02 | End: 2026-06-02

## 2025-06-02 RX ORDER — ATORVASTATIN CALCIUM 40 MG/1
40 TABLET, FILM COATED ORAL DAILY
Qty: 90 TABLET | Refills: 3 | Status: SHIPPED | OUTPATIENT
Start: 2025-06-02 | End: 2026-06-02

## 2025-06-15 DIAGNOSIS — N95.1 MENOPAUSAL SYMPTOMS: ICD-10-CM

## 2025-06-16 RX ORDER — FEZOLINETANT 45 MG/1
45 TABLET, FILM COATED ORAL DAILY
Qty: 30 TABLET | Refills: 3 | Status: SHIPPED | OUTPATIENT
Start: 2025-06-16

## 2025-06-25 ENCOUNTER — CLINICAL SUPPORT (OUTPATIENT)
Dept: ALLERGY | Facility: CLINIC | Age: 55
End: 2025-06-25
Payer: COMMERCIAL

## 2025-06-25 DIAGNOSIS — J30.9 CHRONIC ALLERGIC RHINITIS: Primary | ICD-10-CM

## 2025-06-25 PROCEDURE — 99999 PR PBB SHADOW E&M-EST. PATIENT-LVL I: CPT | Mod: PBBFAC,,,

## 2025-06-25 PROCEDURE — 95117 IMMUNOTHERAPY INJECTIONS: CPT | Mod: S$GLB,,, | Performed by: ALLERGY & IMMUNOLOGY

## 2025-07-16 ENCOUNTER — CLINICAL SUPPORT (OUTPATIENT)
Dept: ALLERGY | Facility: CLINIC | Age: 55
End: 2025-07-16
Payer: COMMERCIAL

## 2025-07-16 DIAGNOSIS — J30.9 CHRONIC ALLERGIC RHINITIS: Primary | ICD-10-CM

## 2025-07-16 PROCEDURE — 95117 IMMUNOTHERAPY INJECTIONS: CPT | Mod: S$GLB,,, | Performed by: ALLERGY & IMMUNOLOGY

## 2025-07-16 PROCEDURE — 99999 PR PBB SHADOW E&M-EST. PATIENT-LVL I: CPT | Mod: PBBFAC,,,

## 2025-07-29 ENCOUNTER — CLINICAL SUPPORT (OUTPATIENT)
Dept: ALLERGY | Facility: CLINIC | Age: 55
End: 2025-07-29
Payer: COMMERCIAL

## 2025-07-29 DIAGNOSIS — J30.9 CHRONIC ALLERGIC RHINITIS: Primary | ICD-10-CM

## 2025-07-29 PROCEDURE — 99999 PR PBB SHADOW E&M-EST. PATIENT-LVL I: CPT | Mod: PBBFAC,,,

## 2025-07-29 PROCEDURE — 95117 IMMUNOTHERAPY INJECTIONS: CPT | Mod: S$GLB,,, | Performed by: STUDENT IN AN ORGANIZED HEALTH CARE EDUCATION/TRAINING PROGRAM

## 2025-08-21 ENCOUNTER — CLINICAL SUPPORT (OUTPATIENT)
Dept: ALLERGY | Facility: CLINIC | Age: 55
End: 2025-08-21
Payer: COMMERCIAL

## 2025-08-21 DIAGNOSIS — J30.9 CHRONIC ALLERGIC RHINITIS: Primary | ICD-10-CM

## 2025-08-21 PROCEDURE — 99999 PR PBB SHADOW E&M-EST. PATIENT-LVL I: CPT | Mod: PBBFAC,,,

## 2025-08-21 PROCEDURE — 95117 IMMUNOTHERAPY INJECTIONS: CPT | Mod: S$GLB,,, | Performed by: STUDENT IN AN ORGANIZED HEALTH CARE EDUCATION/TRAINING PROGRAM

## 2025-08-26 ENCOUNTER — LAB VISIT (OUTPATIENT)
Dept: LAB | Facility: OTHER | Age: 55
End: 2025-08-26
Attending: FAMILY MEDICINE
Payer: COMMERCIAL

## 2025-08-27 PROBLEM — M25.531 RIGHT WRIST PAIN: Status: RESOLVED | Noted: 2025-05-26 | Resolved: 2025-08-27

## 2025-09-02 ENCOUNTER — OFFICE VISIT (OUTPATIENT)
Dept: PRIMARY CARE CLINIC | Facility: CLINIC | Age: 55
End: 2025-09-02
Attending: FAMILY MEDICINE
Payer: COMMERCIAL

## 2025-09-02 DIAGNOSIS — Z13.220 ENCOUNTER FOR LIPID SCREENING FOR CARDIOVASCULAR DISEASE: ICD-10-CM

## 2025-09-02 DIAGNOSIS — Z13.1 SCREENING FOR DIABETES MELLITUS (DM): ICD-10-CM

## 2025-09-02 DIAGNOSIS — E78.2 MIXED HYPERLIPIDEMIA: Primary | ICD-10-CM

## 2025-09-02 DIAGNOSIS — Z13.0 SCREENING, IRON DEFICIENCY ANEMIA: ICD-10-CM

## 2025-09-02 DIAGNOSIS — Z13.29 SCREENING FOR THYROID DISORDER: ICD-10-CM

## 2025-09-02 DIAGNOSIS — Z13.228 SCREENING FOR METABOLIC DISORDER: ICD-10-CM

## 2025-09-02 DIAGNOSIS — Z13.6 ENCOUNTER FOR LIPID SCREENING FOR CARDIOVASCULAR DISEASE: ICD-10-CM

## 2025-09-02 PROCEDURE — 3044F HG A1C LEVEL LT 7.0%: CPT | Mod: CPTII,95,, | Performed by: FAMILY MEDICINE

## 2025-09-02 PROCEDURE — 98006 SYNCH AUDIO-VIDEO EST MOD 30: CPT | Mod: 95,,, | Performed by: FAMILY MEDICINE

## 2025-09-02 PROCEDURE — 1159F MED LIST DOCD IN RCRD: CPT | Mod: CPTII,95,, | Performed by: FAMILY MEDICINE

## 2025-09-02 RX ORDER — ROSUVASTATIN CALCIUM 40 MG/1
40 TABLET, COATED ORAL DAILY
Qty: 90 TABLET | Refills: 3 | Status: SHIPPED | OUTPATIENT
Start: 2025-09-02 | End: 2026-09-02